# Patient Record
Sex: MALE | Race: WHITE | NOT HISPANIC OR LATINO | Employment: OTHER | URBAN - METROPOLITAN AREA
[De-identification: names, ages, dates, MRNs, and addresses within clinical notes are randomized per-mention and may not be internally consistent; named-entity substitution may affect disease eponyms.]

---

## 2017-05-05 ENCOUNTER — HOSPITAL ENCOUNTER (INPATIENT)
Facility: HOSPITAL | Age: 77
LOS: 2 days | Discharge: HOME | DRG: 189 | End: 2017-05-07
Attending: EMERGENCY MEDICINE | Admitting: INTERNAL MEDICINE
Payer: MEDICARE

## 2017-05-05 DIAGNOSIS — J96.01 ACUTE RESPIRATORY FAILURE WITH HYPOXIA: ICD-10-CM

## 2017-05-05 DIAGNOSIS — J44.1 COPD WITH EXACERBATION: Primary | ICD-10-CM

## 2017-05-05 DIAGNOSIS — J96.11 CHRONIC HYPOXEMIC RESPIRATORY FAILURE: ICD-10-CM

## 2017-05-05 DIAGNOSIS — R06.00 DYSPNEA, UNSPECIFIED TYPE: ICD-10-CM

## 2017-05-05 DIAGNOSIS — R09.02 HYPOXEMIA: ICD-10-CM

## 2017-05-05 DIAGNOSIS — R06.02 SOB (SHORTNESS OF BREATH): ICD-10-CM

## 2017-05-05 DIAGNOSIS — R73.9 HYPERGLYCEMIA: ICD-10-CM

## 2017-05-05 LAB
ALBUMIN SERPL BCP-MCNC: 3.2 G/DL
ALLENS TEST: ABNORMAL
ALP SERPL-CCNC: 73 U/L
ALT SERPL W/O P-5'-P-CCNC: 28 U/L
ANION GAP SERPL CALC-SCNC: 11 MMOL/L
AST SERPL-CCNC: 24 U/L
BASOPHILS # BLD AUTO: 0.03 K/UL
BASOPHILS NFR BLD: 0.3 %
BILIRUB SERPL-MCNC: 1.5 MG/DL
BILIRUB UR QL STRIP: NEGATIVE
BNP SERPL-MCNC: 36 PG/ML
BUN SERPL-MCNC: 12 MG/DL
CALCIUM SERPL-MCNC: 8.8 MG/DL
CHLORIDE SERPL-SCNC: 105 MMOL/L
CLARITY UR: CLEAR
CO2 SERPL-SCNC: 22 MMOL/L
COLOR UR: YELLOW
CREAT SERPL-MCNC: 1 MG/DL
DELSYS: ABNORMAL
DIFFERENTIAL METHOD: ABNORMAL
EOSINOPHIL # BLD AUTO: 0.2 K/UL
EOSINOPHIL NFR BLD: 1.9 %
ERYTHROCYTE [DISTWIDTH] IN BLOOD BY AUTOMATED COUNT: 13.5 %
EST. GFR  (AFRICAN AMERICAN): >60 ML/MIN/1.73 M^2
EST. GFR  (NON AFRICAN AMERICAN): >60 ML/MIN/1.73 M^2
FIO2: 21
GLUCOSE SERPL-MCNC: 152 MG/DL
GLUCOSE UR QL STRIP: ABNORMAL
HCO3 UR-SCNC: 18.7 MMOL/L (ref 24–28)
HCT VFR BLD AUTO: 44.7 %
HGB BLD-MCNC: 15.3 G/DL
HGB UR QL STRIP: NEGATIVE
KETONES UR QL STRIP: ABNORMAL
LACTATE SERPL-SCNC: 1.6 MMOL/L
LEUKOCYTE ESTERASE UR QL STRIP: NEGATIVE
LYMPHOCYTES # BLD AUTO: 1 K/UL
LYMPHOCYTES NFR BLD: 9.4 %
MCH RBC QN AUTO: 30.4 PG
MCHC RBC AUTO-ENTMCNC: 34.2 %
MCV RBC AUTO: 89 FL
MODE: ABNORMAL
MONOCYTES # BLD AUTO: 0.9 K/UL
MONOCYTES NFR BLD: 8.5 %
NEUTROPHILS # BLD AUTO: 8.9 K/UL
NEUTROPHILS NFR BLD: 79.9 %
NITRITE UR QL STRIP: NEGATIVE
PCO2 BLDA: 31.6 MMHG (ref 35–45)
PH SMN: 7.38 [PH] (ref 7.35–7.45)
PH UR STRIP: 6 [PH] (ref 5–8)
PLATELET # BLD AUTO: 162 K/UL
PMV BLD AUTO: 9.8 FL
PO2 BLDA: 53 MMHG (ref 80–100)
POC BE: -6 MMOL/L
POC SATURATED O2: 87 % (ref 95–100)
POTASSIUM SERPL-SCNC: 3.9 MMOL/L
PROT SERPL-MCNC: 7.4 G/DL
PROT UR QL STRIP: NEGATIVE
RBC # BLD AUTO: 5.04 M/UL
SAMPLE: ABNORMAL
SITE: ABNORMAL
SODIUM SERPL-SCNC: 138 MMOL/L
SP GR UR STRIP: 1.01 (ref 1–1.03)
TROPONIN I SERPL DL<=0.01 NG/ML-MCNC: 0.02 NG/ML
URN SPEC COLLECT METH UR: ABNORMAL
UROBILINOGEN UR STRIP-ACNC: ABNORMAL EU/DL
WBC # BLD AUTO: 11.09 K/UL

## 2017-05-05 PROCEDURE — 25000242 PHARM REV CODE 250 ALT 637 W/ HCPCS: Performed by: EMERGENCY MEDICINE

## 2017-05-05 PROCEDURE — 25000003 PHARM REV CODE 250: Performed by: NURSE PRACTITIONER

## 2017-05-05 PROCEDURE — 93005 ELECTROCARDIOGRAM TRACING: CPT

## 2017-05-05 PROCEDURE — 85025 COMPLETE CBC W/AUTO DIFF WBC: CPT

## 2017-05-05 PROCEDURE — 94640 AIRWAY INHALATION TREATMENT: CPT

## 2017-05-05 PROCEDURE — 11000001 HC ACUTE MED/SURG PRIVATE ROOM

## 2017-05-05 PROCEDURE — 83036 HEMOGLOBIN GLYCOSYLATED A1C: CPT

## 2017-05-05 PROCEDURE — 94761 N-INVAS EAR/PLS OXIMETRY MLT: CPT

## 2017-05-05 PROCEDURE — 63600175 PHARM REV CODE 636 W HCPCS: Performed by: EMERGENCY MEDICINE

## 2017-05-05 PROCEDURE — 27000221 HC OXYGEN, UP TO 24 HOURS

## 2017-05-05 PROCEDURE — 21400001 HC TELEMETRY ROOM

## 2017-05-05 PROCEDURE — 36415 COLL VENOUS BLD VENIPUNCTURE: CPT

## 2017-05-05 PROCEDURE — 81003 URINALYSIS AUTO W/O SCOPE: CPT

## 2017-05-05 PROCEDURE — 82803 BLOOD GASES ANY COMBINATION: CPT

## 2017-05-05 PROCEDURE — 63600175 PHARM REV CODE 636 W HCPCS: Performed by: NURSE PRACTITIONER

## 2017-05-05 PROCEDURE — 25500020 PHARM REV CODE 255: Performed by: EMERGENCY MEDICINE

## 2017-05-05 PROCEDURE — 36600 WITHDRAWAL OF ARTERIAL BLOOD: CPT

## 2017-05-05 PROCEDURE — 93010 ELECTROCARDIOGRAM REPORT: CPT | Mod: ,,, | Performed by: INTERNAL MEDICINE

## 2017-05-05 PROCEDURE — 83880 ASSAY OF NATRIURETIC PEPTIDE: CPT

## 2017-05-05 PROCEDURE — 96374 THER/PROPH/DIAG INJ IV PUSH: CPT

## 2017-05-05 PROCEDURE — 83605 ASSAY OF LACTIC ACID: CPT

## 2017-05-05 PROCEDURE — 99285 EMERGENCY DEPT VISIT HI MDM: CPT | Mod: 25

## 2017-05-05 PROCEDURE — 80053 COMPREHEN METABOLIC PANEL: CPT

## 2017-05-05 PROCEDURE — 84484 ASSAY OF TROPONIN QUANT: CPT

## 2017-05-05 PROCEDURE — 99900035 HC TECH TIME PER 15 MIN (STAT)

## 2017-05-05 PROCEDURE — 25000242 PHARM REV CODE 250 ALT 637 W/ HCPCS: Performed by: NURSE PRACTITIONER

## 2017-05-05 PROCEDURE — 87040 BLOOD CULTURE FOR BACTERIA: CPT | Mod: 59

## 2017-05-05 RX ORDER — ACETAMINOPHEN 325 MG/1
650 TABLET ORAL EVERY 6 HOURS PRN
Status: DISCONTINUED | OUTPATIENT
Start: 2017-05-05 | End: 2017-05-07 | Stop reason: HOSPADM

## 2017-05-05 RX ORDER — RAMELTEON 8 MG/1
8 TABLET ORAL NIGHTLY PRN
Status: DISCONTINUED | OUTPATIENT
Start: 2017-05-05 | End: 2017-05-07 | Stop reason: HOSPADM

## 2017-05-05 RX ORDER — MOXIFLOXACIN HYDROCHLORIDE 400 MG/250ML
400 INJECTION, SOLUTION INTRAVENOUS
Status: DISCONTINUED | OUTPATIENT
Start: 2017-05-05 | End: 2017-05-07 | Stop reason: HOSPADM

## 2017-05-05 RX ORDER — BUDESONIDE 0.5 MG/2ML
0.5 INHALANT ORAL EVERY 12 HOURS
Status: DISCONTINUED | OUTPATIENT
Start: 2017-05-05 | End: 2017-05-07 | Stop reason: HOSPADM

## 2017-05-05 RX ORDER — IPRATROPIUM BROMIDE AND ALBUTEROL SULFATE 2.5; .5 MG/3ML; MG/3ML
3 SOLUTION RESPIRATORY (INHALATION)
Status: DISCONTINUED | OUTPATIENT
Start: 2017-05-05 | End: 2017-05-07 | Stop reason: HOSPADM

## 2017-05-05 RX ORDER — IPRATROPIUM BROMIDE AND ALBUTEROL SULFATE 2.5; .5 MG/3ML; MG/3ML
3 SOLUTION RESPIRATORY (INHALATION)
Status: COMPLETED | OUTPATIENT
Start: 2017-05-05 | End: 2017-05-05

## 2017-05-05 RX ORDER — ONDANSETRON 2 MG/ML
4 INJECTION INTRAMUSCULAR; INTRAVENOUS EVERY 8 HOURS PRN
Status: DISCONTINUED | OUTPATIENT
Start: 2017-05-05 | End: 2017-05-07 | Stop reason: HOSPADM

## 2017-05-05 RX ORDER — METHYLPREDNISOLONE SOD SUCC 125 MG
125 VIAL (EA) INJECTION
Status: COMPLETED | OUTPATIENT
Start: 2017-05-05 | End: 2017-05-05

## 2017-05-05 RX ADMIN — METHYLPREDNISOLONE SODIUM SUCCINATE 125 MG: 125 INJECTION, POWDER, FOR SOLUTION INTRAMUSCULAR; INTRAVENOUS at 11:05

## 2017-05-05 RX ADMIN — ACETAMINOPHEN 650 MG: 325 TABLET ORAL at 09:05

## 2017-05-05 RX ADMIN — IPRATROPIUM BROMIDE AND ALBUTEROL SULFATE 3 ML: .5; 3 SOLUTION RESPIRATORY (INHALATION) at 07:05

## 2017-05-05 RX ADMIN — MOXIFLOXACIN HYDROCHLORIDE 400 MG: 400 INJECTION, SOLUTION INTRAVENOUS at 07:05

## 2017-05-05 RX ADMIN — IPRATROPIUM BROMIDE AND ALBUTEROL SULFATE 3 ML: .5; 3 SOLUTION RESPIRATORY (INHALATION) at 03:05

## 2017-05-05 RX ADMIN — METHYLPREDNISOLONE SODIUM SUCCINATE 80 MG: 40 INJECTION, POWDER, FOR SOLUTION INTRAMUSCULAR; INTRAVENOUS at 10:05

## 2017-05-05 RX ADMIN — RAMELTEON 8 MG: 8 TABLET, FILM COATED ORAL at 10:05

## 2017-05-05 RX ADMIN — IOHEXOL 100 ML: 350 INJECTION, SOLUTION INTRAVENOUS at 02:05

## 2017-05-05 RX ADMIN — IPRATROPIUM BROMIDE AND ALBUTEROL SULFATE 3 ML: .5; 3 SOLUTION RESPIRATORY (INHALATION) at 11:05

## 2017-05-05 RX ADMIN — BUDESONIDE 0.5 MG: 0.5 SUSPENSION RESPIRATORY (INHALATION) at 07:05

## 2017-05-05 RX ADMIN — IPRATROPIUM BROMIDE AND ALBUTEROL SULFATE 3 ML: .5; 3 SOLUTION RESPIRATORY (INHALATION) at 01:05

## 2017-05-05 NOTE — SUBJECTIVE & OBJECTIVE
No past medical history on file.    No past surgical history on file.    Review of patient's allergies indicates:  No Known Allergies    No current facility-administered medications on file prior to encounter.      No current outpatient prescriptions on file prior to encounter.     Family History     None        Social History Main Topics    Smoking status: Not on file    Smokeless tobacco: Not on file    Alcohol use Not on file    Drug use: Not on file    Sexual activity: Not on file     Review of Systems   Constitutional: Positive for fever. Negative for appetite change, chills and fatigue.   HENT: Negative for tinnitus and trouble swallowing.    Eyes: Negative.    Respiratory: Positive for cough and shortness of breath. Negative for apnea, choking, chest tightness, wheezing and stridor.    Cardiovascular: Negative for chest pain, palpitations and leg swelling.   Gastrointestinal: Negative for abdominal pain, blood in stool, constipation, diarrhea, nausea and vomiting.   Endocrine: Negative.    Genitourinary: Negative.    Musculoskeletal: Negative.    Skin: Negative.    Allergic/Immunologic: Negative.    Neurological: Negative for dizziness, tremors, seizures, syncope, facial asymmetry, speech difficulty, weakness, light-headedness, numbness and headaches.   Hematological: Negative.    Psychiatric/Behavioral: Negative.      Objective:     Vital Signs (Most Recent):  Temp: 98.1 °F (36.7 °C) (05/05/17 1827)  Pulse: 106 (05/05/17 1827)  Resp: (!) 22 (05/05/17 1827)  BP: 131/81 (05/05/17 1827)  SpO2: 95 % (05/05/17 1827) Vital Signs (24h Range):  Temp:  [98.1 °F (36.7 °C)-99.6 °F (37.6 °C)] 98.1 °F (36.7 °C)  Pulse:  [] 106  Resp:  [18-22] 22  SpO2:  [92 %-96 %] 95 %  BP: (117-176)/(58-86) 131/81        There is no height or weight on file to calculate BMI.    Physical Exam   Constitutional: He is oriented to person, place, and time. He appears well-developed and well-nourished.   Obese   HENT:   Head:  Normocephalic and atraumatic.   Eyes: Conjunctivae and EOM are normal.   Neck: Normal range of motion. Neck supple.   Cardiovascular: Normal rate, regular rhythm and intact distal pulses.    Murmur heard.  Pulmonary/Chest: Effort normal. He has wheezes.   Abdominal: Soft. Bowel sounds are normal. There is no tenderness.   Musculoskeletal: Normal range of motion.   Neurological: He is oriented to person, place, and time. He has normal reflexes.   Skin: Skin is warm and dry.   Psychiatric: He has a normal mood and affect. His behavior is normal. Judgment and thought content normal.   Nursing note and vitals reviewed.       Significant Labs:   ABGs:     Recent Labs  Lab 05/05/17  1318   PH 7.380   PCO2 31.6*   HCO3 18.7*   POCSATURATED 87*   BE -6     CBC:     Recent Labs  Lab 05/05/17  1136   WBC 11.09   HGB 15.3   HCT 44.7        CMP:     Recent Labs  Lab 05/05/17  1136      K 3.9      CO2 22*   *   BUN 12   CREATININE 1.0   CALCIUM 8.8   PROT 7.4   ALBUMIN 3.2*   BILITOT 1.5*   ALKPHOS 73   AST 24   ALT 28   ANIONGAP 11   EGFRNONAA >60     Lactic Acid:     Recent Labs  Lab 05/05/17  1136   LACTATE 1.6     Troponin:     Recent Labs  Lab 05/05/17  1136   TROPONINI 0.022     Urine Studies:     Recent Labs  Lab 05/05/17  1353   COLORU Yellow   APPEARANCEUA Clear   PHUR 6.0   SPECGRAV 1.015   PROTEINUA Negative   GLUCUA Trace*   KETONESU Trace*   BILIRUBINUA Negative   OCCULTUA Negative   NITRITE Negative   UROBILINOGEN 2.0-3.0*   LEUKOCYTESUR Negative       Significant Imaging:   Imaging Results         CTA Chest Non-Coronary (PE Study) (Final result) Result time:  05/05/17 15:12:41    Final result by Max York III, MD (05/05/17 15:12:41)    Narrative:    CT angiogram of the chest.    Clinical indication: Shortness of breath. Chest pain.    Multiplanar imaging submitted. Standard and MIPS/3-D images submitted.    There is no mediastinal mass or bulky adenopathy. Heart size is normal.  No hilar mass or bulky adenopathy. Small hilar nodes are identified. No axillary adenopathy.    There is opacification of the thoracic aorta with atheromatous change. No evidence of aneurysm formation or dissection. Suspect a small hiatal hernia.    There is opacification of the thoracic of the pulmonary arterial system. No gross filling defects in the major branches    The lungs show severe chronic change with diffuse emphysematous change primarily centrilobular. There is thickening of the interlobular septa as well. There is moderately severe scarring posteriorly at both lung bases and suspicion of mild superimposed basilar infiltrate and atelectatic change, greater on the right. There is bronchiectatic change greatest at the lung bases.    Within the upper portion of the abdomen, there is no liver or splenic abnormality within the visualized segments.. There is no adrenal or pancreatic mass or enlargement. There is atheromatous change along the aorta. Diverticula are noted in the colon without gross evidence of diverticulitis within the visualized segments. Bony windows show multilevel degenerative change but no gross acute abnormality.    Impression    1. Negative for acute pulmonary emboli. Negative for thoracic aortic aneurysm or dissection.    2. Severe chronic lung disease including rather severe emphysematous change bilaterally. There also changes of bronchiectasis and basilar scarring/fibrosis. There is superimposed posterior basilar atelectasis and or infiltrate of a mild degree, greater on the right.    3. Otherwise as above.        Electronically signed by: MAX SNEED MD  Date:     05/05/17  Time:    15:12             X-Ray Chest PA And Lateral (Final result) Result time:  05/05/17 12:01:02    Final result by Max Sneed III, MD (05/05/17 12:01:02)    Impression:     Suspect minimal right basilar atelectasis or scarring. Chest is otherwise static. Granuloma right upper lung  field.      Electronically signed by: NATASHA SNEED MD  Date:     05/05/17  Time:    12:01     Narrative:    Two-view chest x-ray.    Clinical indication: Asthma    Compared to 2010.        Heart size is normal. The lungs again demonstrate a granuloma in the right upper lung field. Currently there is minimal right basilar scarring or atelectasis. No consolidation or effusion.

## 2017-05-05 NOTE — IP AVS SNAPSHOT
[facility]  [address] [place] [address] [address] [place] [address]           Patient Discharge Instructions   Our goal is to set you up for success. This packet includes information on your condition, medications, and your home care.  It will help you care for yourself to prevent having to return to the hospital.     Please ask your nurse if you have any questions.      There are many details to remember when preparing to leave the hospital. Here is what you will need to do:    1. Take your medicine. If you are prescribed medications, review your Medication List on the following pages. You may have new medications to  at the pharmacy and others that you'll need to stop taking. Review the instructions for how and when to take your medications. Talk with your doctor or nurses if you are unsure of what to do.     2. Go to your follow-up appointments. Specific follow-up information is listed in the following pages. Your may be contacted by a nurse or clinical provider about future appointments. Be sure we have all of the phone numbers to reach you. Please contact your provider's office if you are unable to make an appointment.     3. Watch for warning signs. Your doctor or nurse will give you detailed warning signs to watch for and when to call for assistance. These instructions may also include educational information about your condition. If you experience any of warning signs to your health, call your doctor.               ** Verify the list of medication(s) below is accurate and up to date. Carry this with you in case of emergency. If your medications have changed, please notify your healthcare provider.             Medication List      START taking these medications        Additional Info                      albuterol 2.5 mg /3 mL (0.083 %) nebulizer solution   Commonly known as:  PROVENTIL   Quantity:  1 Box   Refills:  3   Dose:  2.5 mg    Instructions:  Take 3 mLs (2.5 mg total) by  nebulization every 8 (eight) hours while awake.     Begin Date    AM    Noon    PM    Bedtime       levoFLOXacin 750 MG tablet   Commonly known as:  LEVAQUIN   Quantity:  5 tablet   Refills:  0   Dose:  750 mg    Instructions:  Take 1 tablet (750 mg total) by mouth once daily.     Begin Date    AM    Noon    PM    Bedtime       predniSONE 20 MG tablet   Commonly known as:  DELTASONE   Quantity:  30 tablet   Refills:  1   Dose:  20 mg    Instructions:  Take 1 tablet (20 mg total) by mouth As instructed. 3tab po daily x3days,2tabs po daily x3days,1tab po daily x3days,1/2tab po daily x3days     Begin Date    AM    Noon    PM    Bedtime         CONTINUE taking these medications        Additional Info                      escitalopram oxalate 20 MG tablet   Commonly known as:  LEXAPRO   Refills:  0   Dose:  20 mg    Last time this was given:  20 mg on 5/7/2017  8:37 AM   Instructions:  Take 20 mg by mouth once daily.     Begin Date    AM    Noon    PM    Bedtime       metformin 500 MG tablet   Commonly known as:  GLUCOPHAGE   Refills:  0   Dose:  500 mg    Instructions:  Take 500 mg by mouth every evening.     Begin Date    AM    Noon    PM    Bedtime       pantoprazole 40 MG tablet   Commonly known as:  PROTONIX   Refills:  0   Dose:  40 mg    Instructions:  Take 40 mg by mouth once daily.     Begin Date    AM    Noon    PM    Bedtime       simvastatin 10 MG tablet   Commonly known as:  ZOCOR   Refills:  0   Dose:  10 mg    Last time this was given:  10 mg on 5/7/2017  8:37 AM   Instructions:  Take 10 mg by mouth once daily.     Begin Date    AM    Noon    PM    Bedtime       tamsulosin 0.4 mg Cp24   Commonly known as:  FLOMAX   Refills:  0   Dose:  0.4 mg    Last time this was given:  0.4 mg on 5/7/2017  8:37 AM   Instructions:  Take 0.4 mg by mouth once daily. evening     Begin Date    AM    Noon    PM    Bedtime            Where to Get Your Medications      You can get these medications from any pharmacy     Bring a  paper prescription for each of these medications     albuterol 2.5 mg /3 mL (0.083 %) nebulizer solution    levoFLOXacin 750 MG tablet    predniSONE 20 MG tablet                  Please bring to all follow up appointments:    1. A copy of your discharge instructions.  2. All medicines you are currently taking in their original bottles.  3. Identification and insurance card.    Please arrive 15 minutes ahead of scheduled appointment time.    Please call 24 hours in advance if you must reschedule your appointment and/or time.        Your Scheduled Appointments     May 08, 2017 12:00 PM CDT   Spirometry with SPIROMETRY, [facility]- Pulmonary Function [facility] ([facility])    [address] [place] [address]   [phone]            May 08, 2017 12:20 PM CDT   New Patient with Elizabeth Lejeune, NP   [facility] ([facility])    [address] [place] [address]   [phone]              Follow-up Information     Follow up with Mir Joaquin MD. Schedule an appointment as soon as possible for a visit in 1 week.    Specialty:  Pulmonary Disease    Why:  hospital follow up    Contact information:    [address]  [place] [address]  [phone]          Discharge Instructions     Future Orders    Activity as tolerated     Call MD for:  difficulty breathing or increased cough     Call MD for:  increased confusion or weakness     Call MD for:  persistent dizziness, light-headedness, or visual disturbances     Diet general     Questions:    Total calories:      Fat restriction, if any:      Protein restriction, if any:      Na restriction, if any:      Fluid restriction:      Additional restrictions:  Diabetic 1800    OXYGEN FOR HOME USE     Questions:    Liter Flow:  2    Duration:  Continuous    Qualifying SpO2:  87    Testing done at:  Rest    Route:  nasal cannula    Portable mode:  pulse dose acceptable    Device:  home concentrator with portable unit    Length of need  (in months):  99 mos    Patient condition with qualifying saturation:  COPD    Height:  6' (1.829 m)    Weight:  98.9 kg (218 lb)    Does patient have medical equipment at home?:      Alternative treatment measures have been tried or considered and deemed clinically ineffective.:  Yes        Primary Diagnosis     Your primary diagnosis was:  Chronic Bronchitis      Admission Information     Date & Time Provider Department CSN    5/5/2017 10:26 AM Edwin Jackman MD [facility] -  [id number]      Care Providers     Provider Role Specialty Primary office phone    Edwin Jackman MD Attending Provider Internal Medicine [phone]    Edwin Jackman MD Team Attending  Internal Medicine [phone]    Mir Joaquin MD Consulting Physician  Pulmonary Disease [phone]      Your Vitals Were     BP Pulse Temp Resp Height Weight    143/70 (BP Location: Right arm, Patient Position: Lying, BP Method: Automatic) 122 98 °F (36.7 °C) (Oral) 20 6' (1.829 m) 98.9 kg (218 lb)    SpO2 BMI             84% 29.57 kg/m2         Recent Lab Values        5/5/2017                           8:50 PM           A1C 7.3 (H)           Comment for A1C at  8:50 PM on 5/5/2017:  According to ADA guidelines, hemoglobin A1C <7.0% represents  optimal control in non-pregnant diabetic patients.  Different  metrics may apply to specific populations.   Standards of Medical Care in Diabetes - 2016.  For the purpose of screening for the presence of diabetes:  <5.7%     Consistent with the absence of diabetes  5.7-6.4%  Consistent with increasing risk for diabetes   (prediabetes)  >or=6.5%  Consistent with diabetes  Currently no consensus exists for use of hemoglobin A1C  for diagnosis of diabetes for children.        Pending Labs     Order Current Status    Blood culture #1 Preliminary result    Blood culture #2 Preliminary result      Allergies as of 5/7/2017     No Known Allergies      [facility]     [facility]  Care Line - 24/7 Assistance  Unless otherwise directed by your provider, please contact [facility], our nurse care line that is available for 24/7 assistance.     Registered nurses in the [facility] provide clinical advisement, health education, appointment booking, and other advisory services.  Call for this free service at [phone].        Advance Directives     An advance directive is a document which, in the event you are no longer able to make decisions for yourself, tells your healthcare team what kind of treatment you do or do not want to receive, or who you would like to make those decisions for you.  If you do not currently have an advance directive, [facility] encourages you to create one.  For more information call:  [phone]), [phone] ([phone]),  or log on to [email or web address].        Smoking Cessation     If you would like to quit smoking:   You may be eligible for free services if you are a [place] resident and started smoking cigarettes before September 1, 1988.  Call the Smoking Cessation Trust (SCT) toll free at (283) 216-6372 or (998) 609-3095.   Call 8-443-QUIT-NOW if you do not meet the above criteria.   Contact us via email: [email or web address]   View our website for more information: [email or web address]        Language Assistance Services     ATTENTION: Language assistance services are available, free of charge. Please call [phone].      ATENCIÓN: Si habla español, tiene a chin disposición servicios gratuitos de asistencia lingüística. Llame al [phone].     CHÚ Ý: N?u b?n nói Ti?ng Vi?t, có các d?ch v? h? tr? ngôn ng? mi?n phí dành cho b?n. G?i s? [phone].        Diabetes Discharge Instructions                                   [facility] Sign-Up     Activating your [facility] account is as easy as 1-2-3!     1) Visit [email or web address], select Sign Up Now, enter this activation code and your date of birth,  then select Next.  [email or web address]  Expires: 6/21/2017 11:57 AM      2) Create a username and password to use when you visit [facility] in the future and select a security question in case you lose your password and select Next.    3) Enter your e-mail address and click Sign Up!    Additional Information  If you have questions, please e-mail [email or web address] or call [phone] to talk to our [facility] staff. Remember, [facility] is NOT to be used for urgent needs. For medical emergencies, dial 911.          [facility] complies with applicable Federal civil rights laws and does not discriminate on the basis of race, color, national origin, age, disability, or sex.

## 2017-05-05 NOTE — ED NOTES
Daughter at nurses station requesting to speak to doctor and someone fix alarm on monitor.  O2 sat 90% on RA.  O2 2L NC placed on pt.

## 2017-05-05 NOTE — ASSESSMENT & PLAN NOTE
- Admit to Med Surg  - Supplemental oxygen prn, keep O2 sats > 88%  - Scheduled inhaled medications

## 2017-05-05 NOTE — ED PROVIDER NOTES
SCRIBE #1 NOTE: I, Mima Hope, am scribing for, and in the presence of, Rashaad Bailey MD. I have scribed the entire note.      History      Chief Complaint   Patient presents with    Shortness of Breath     shortness of breath and fever       Review of patient's allergies indicates:  No Known Allergies     HPI   HPI    5/5/2017, 11:17 AM   History obtained from the patient      History of Present Illness: Stone Reveles is a 76 y.o. male patient who presents to the Emergency Department for SOB which onset gradually 4 days ago. Symptoms are constant and moderate in severity. Pt notes positive sick contact. Pt son had bronchitis. No mitigating or exacerbating factors reported. Associated sxs include subjective fever and cough. Patient denies any n/v/d, abd pain, CP, chest tightness, wheezing, back pain, dysuria, hematuria, flank pain, HA, lightheadedness, and all other sxs at this time. Prior Tx includes ibuprofen with intermittent relief and nebulizer. No further complaints or concerns at this time.         Arrival mode: Personal vehicle    PCP: No primary care provider on file.       Past Medical History:  Past medical history reviewed not relevant      Past Surgical History:  Past surgical history reviewed not relevant      Family History:  Family history reviewed not relevant      Social History:  Unknown    Social History Main Topics    Social History Main Topics    Smoking status: Unknown if ever smoked    Smokeless tobacco: Unknown if ever used    Alcohol Use: Unknown drinking history    Drug Use: Unknown if ever used    Sexual Activity: Unknown         ROS   Review of Systems   Constitutional: Positive for fever (subjective).   HENT: Negative for congestion and sore throat.    Respiratory: Positive for cough and shortness of breath. Negative for chest tightness and wheezing.    Cardiovascular: Negative for chest pain.   Gastrointestinal: Negative for abdominal pain, constipation, diarrhea, nausea and  vomiting.   Genitourinary: Negative for decreased urine volume, difficulty urinating, dysuria, flank pain, frequency and hematuria.   Musculoskeletal: Negative for back pain.   Skin: Negative for rash.   Neurological: Negative for dizziness, weakness, light-headedness and headaches.   Hematological: Does not bruise/bleed easily.       Physical Exam    Initial Vitals   BP Pulse Resp Temp SpO2   05/05/17 1033 05/05/17 1033 05/05/17 1033 05/05/17 1033 05/05/17 1033   136/71 100 20 99.6 °F (37.6 °C) 96 %      Physical Exam  Nursing Notes and Vital Signs Reviewed.  Constitutional: Patient is in no acute distress. Awake and alert. Well-developed and well-nourished.  Head: Atraumatic. Normocephalic.  Eyes: PERRL. EOM intact. Conjunctivae are not pale. No scleral icterus.  ENT: Mucous membranes are moist. Oropharynx is clear and symmetric.    Neck: Supple. Full ROM. No lymphadenopathy.  Cardiovascular: Regular rate. Regular rhythm. No murmurs, rubs, or gallops. Distal pulses are 2+ and symmetric.  Pulmonary/Chest: No respiratory distress. Mild wheezing bilaterally. No rales, or rhonchi.  Abdominal: Soft and non-distended.  There is no tenderness.  No rebound, guarding, or rigidity. Good bowel sounds.  Musculoskeletal: Moves all extremities. No obvious deformities. No edema. No calf tenderness.  Skin: Warm and dry.  Neurological:  Alert, awake, and appropriate.  Normal speech.  No acute focal neurological deficits are appreciated.  Psychiatric: Normal affect. Good eye contact. Appropriate in content.    ED Course    Procedures  ED Vital Signs:  Vitals:    05/05/17 1033 05/05/17 1129 05/05/17 1240 05/05/17 1300   BP: 136/71  (!) 117/58    Pulse: 100 81 89 89   Resp: 20 20 18 20   Temp: 99.6 °F (37.6 °C)      TempSrc: Oral      SpO2: 96% 96% (!) 92% (!) 93%    05/05/17 1354 05/05/17 1540 05/05/17 1800 05/05/17 1827   BP: (!) 176/86  (!) 147/70 131/81   Pulse: 96 89 95 106   Resp: 20 18 18 (!) 22   Temp:   98.4 °F (36.9 °C)  98.1 °F (36.7 °C)   TempSrc:   Oral Oral   SpO2: (!) 94% (!) 94% 95% 95%    05/05/17 1936   BP: 139/71   Pulse: 93   Resp: 20   Temp: 98.1 °F (36.7 °C)   TempSrc: Oral   SpO2: 96%       Abnormal Lab Results:  Labs Reviewed   CBC W/ AUTO DIFFERENTIAL - Abnormal; Notable for the following:        Result Value    Gran # 8.9 (*)     Gran% 79.9 (*)     Lymph% 9.4 (*)     All other components within normal limits   COMPREHENSIVE METABOLIC PANEL - Abnormal; Notable for the following:     CO2 22 (*)     Glucose 152 (*)     Albumin 3.2 (*)     Total Bilirubin 1.5 (*)     All other components within normal limits   URINALYSIS - Abnormal; Notable for the following:     Glucose, UA Trace (*)     Ketones, UA Trace (*)     Urobilinogen, UA 2.0-3.0 (*)     All other components within normal limits   ISTAT PROCEDURE - Abnormal; Notable for the following:     POC PCO2 31.6 (*)     POC PO2 53 (*)     POC HCO3 18.7 (*)     POC SATURATED O2 87 (*)     All other components within normal limits   CULTURE, BLOOD   CULTURE, BLOOD   LACTIC ACID, PLASMA   TROPONIN I   B-TYPE NATRIURETIC PEPTIDE        All Lab Results:  Results for orders placed or performed during the hospital encounter of 05/05/17   CBC auto differential   Result Value Ref Range    WBC 11.09 3.90 - 12.70 K/uL    RBC 5.04 4.60 - 6.20 M/uL    Hemoglobin 15.3 14.0 - 18.0 g/dL    Hematocrit 44.7 40.0 - 54.0 %    MCV 89 82 - 98 fL    MCH 30.4 27.0 - 31.0 pg    MCHC 34.2 32.0 - 36.0 %    RDW 13.5 11.5 - 14.5 %    Platelets 162 150 - 350 K/uL    MPV 9.8 9.2 - 12.9 fL    Gran # 8.9 (H) 1.8 - 7.7 K/uL    Lymph # 1.0 1.0 - 4.8 K/uL    Mono # 0.9 0.3 - 1.0 K/uL    Eos # 0.2 0.0 - 0.5 K/uL    Baso # 0.03 0.00 - 0.20 K/uL    Gran% 79.9 (H) 38.0 - 73.0 %    Lymph% 9.4 (L) 18.0 - 48.0 %    Mono% 8.5 4.0 - 15.0 %    Eosinophil% 1.9 0.0 - 8.0 %    Basophil% 0.3 0.0 - 1.9 %    Differential Method Automated    Comprehensive metabolic panel   Result Value Ref Range    Sodium 138 136 - 145  mmol/L    Potassium 3.9 3.5 - 5.1 mmol/L    Chloride 105 95 - 110 mmol/L    CO2 22 (L) 23 - 29 mmol/L    Glucose 152 (H) 70 - 110 mg/dL    BUN, Bld 12 8 - 23 mg/dL    Creatinine 1.0 0.5 - 1.4 mg/dL    Calcium 8.8 8.7 - 10.5 mg/dL    Total Protein 7.4 6.0 - 8.4 g/dL    Albumin 3.2 (L) 3.5 - 5.2 g/dL    Total Bilirubin 1.5 (H) 0.1 - 1.0 mg/dL    Alkaline Phosphatase 73 55 - 135 U/L    AST 24 10 - 40 U/L    ALT 28 10 - 44 U/L    Anion Gap 11 8 - 16 mmol/L    eGFR if African American >60 >60 mL/min/1.73 m^2    eGFR if non African American >60 >60 mL/min/1.73 m^2   Lactic acid, plasma   Result Value Ref Range    Lactate (Lactic Acid) 1.6 0.5 - 2.2 mmol/L   Troponin I   Result Value Ref Range    Troponin I 0.022 0.000 - 0.026 ng/mL   Urinalysis   Result Value Ref Range    Specimen UA Urine, Clean Catch     Color, UA Yellow Yellow, Straw, Jolie    Appearance, UA Clear Clear    pH, UA 6.0 5.0 - 8.0    Specific Gravity, UA 1.015 1.005 - 1.030    Protein, UA Negative Negative    Glucose, UA Trace (A) Negative    Ketones, UA Trace (A) Negative    Bilirubin (UA) Negative Negative    Occult Blood UA Negative Negative    Nitrite, UA Negative Negative    Urobilinogen, UA 2.0-3.0 (A) <2.0 EU/dL    Leukocytes, UA Negative Negative   Brain natriuretic peptide   Result Value Ref Range    BNP 36 0 - 99 pg/mL   ISTAT PROCEDURE   Result Value Ref Range    POC PH 7.380 7.35 - 7.45    POC PCO2 31.6 (L) 35 - 45 mmHg    POC PO2 53 (LL) 80 - 100 mmHg    POC HCO3 18.7 (L) 24 - 28 mmol/L    POC BE -6 -2 to 2 mmol/L    POC SATURATED O2 87 (L) 95 - 100 %    Sample ARTERIAL     Site LR     Allens Test Pass     DelSys Room Air     Mode SPONT     FiO2 21          Imaging Results:  Imaging Results         CTA Chest Non-Coronary (PE Study) (Final result) Result time:  05/05/17 15:12:41    Final result by Max York III, MD (05/05/17 15:12:41)    Narrative:    CT angiogram of the chest.    Clinical indication: Shortness of breath. Chest  pain.    Multiplanar imaging submitted. Standard and MIPS/3-D images submitted.    There is no mediastinal mass or bulky adenopathy. Heart size is normal. No hilar mass or bulky adenopathy. Small hilar nodes are identified. No axillary adenopathy.    There is opacification of the thoracic aorta with atheromatous change. No evidence of aneurysm formation or dissection. Suspect a small hiatal hernia.    There is opacification of the thoracic of the pulmonary arterial system. No gross filling defects in the major branches    The lungs show severe chronic change with diffuse emphysematous change primarily centrilobular. There is thickening of the interlobular septa as well. There is moderately severe scarring posteriorly at both lung bases and suspicion of mild superimposed basilar infiltrate and atelectatic change, greater on the right. There is bronchiectatic change greatest at the lung bases.    Within the upper portion of the abdomen, there is no liver or splenic abnormality within the visualized segments.. There is no adrenal or pancreatic mass or enlargement. There is atheromatous change along the aorta. Diverticula are noted in the colon without gross evidence of diverticulitis within the visualized segments. Bony windows show multilevel degenerative change but no gross acute abnormality.    Impression    1. Negative for acute pulmonary emboli. Negative for thoracic aortic aneurysm or dissection.    2. Severe chronic lung disease including rather severe emphysematous change bilaterally. There also changes of bronchiectasis and basilar scarring/fibrosis. There is superimposed posterior basilar atelectasis and or infiltrate of a mild degree, greater on the right.    3. Otherwise as above.        Electronically signed by: MAX SNEED MD  Date:     05/05/17  Time:    15:12             X-Ray Chest PA And Lateral (Final result) Result time:  05/05/17 12:01:02    Final result by Max Sneed III, MD  (05/05/17 12:01:02)    Impression:     Suspect minimal right basilar atelectasis or scarring. Chest is otherwise static. Granuloma right upper lung field.      Electronically signed by: NATASHA SNEED MD  Date:     05/05/17  Time:    12:01     Narrative:    Two-view chest x-ray.    Clinical indication: Asthma    Compared to 2010.        Heart size is normal. The lungs again demonstrate a granuloma in the right upper lung field. Currently there is minimal right basilar scarring or atelectasis. No consolidation or effusion.             The EKG was ordered, reviewed, and independently interpreted by the ED provider.  Interpretation time: 10:33  Rate: 96 BPM  Rhythm: normal sinus rhythm  Interpretation: No STEMI.             The Emergency Provider reviewed the vital signs and test results, which are outlined above.    ED Discussion     4:09 PM: Discussed case with Lamar Jj NP ([facility]). Lamar agrees with current care and management of pt and accepts admission.   Admitting Service: [facility] medicine   Admitting Physician: Dr. Jackman  Admit to: Med Surg    4:10 PM: Re-evaluated pt. I have discussed test results, shared treatment plan, and the need for admission with patient and family at bedside. Pt and family express understanding at this time and agree with all information. All questions answered. Pt and family have no further questions or concerns at this time. Pt is ready for admit.        ED Medication(s):  Medications   moxifloxacin 400 mg/250 mL IVPB 400 mg (not administered)   methylPREDNISolone sodium succinate injection 80 mg (not administered)   albuterol-ipratropium 2.5mg-0.5mg/3mL nebulizer solution 3 mL (not administered)   budesonide nebulizer solution 0.5 mg (not administered)   ondansetron injection 4 mg (not administered)   promethazine (PHENERGAN) 6.25 mg in dextrose 5 % 50 mL IVPB (not administered)   acetaminophen tablet 650 mg (not administered)   ramelteon tablet 8 mg (not  administered)   albuterol-ipratropium 2.5mg-0.5mg/3mL nebulizer solution 3 mL (3 mLs Nebulization Given 5/5/17 1129)   methylPREDNISolone sodium succinate injection 125 mg (125 mg Intravenous Given 5/5/17 1115)   albuterol-ipratropium 2.5mg-0.5mg/3mL nebulizer solution 3 mL (3 mLs Nebulization Given 5/5/17 1300)   omnipaque 350 iohexol 100 mL (100 mLs Intravenous Given 5/5/17 1447)   albuterol-ipratropium 2.5mg-0.5mg/3mL nebulizer solution 3 mL (3 mLs Nebulization Given 5/5/17 1540)       There are no discharge medications for this patient.            Medical Decision Making    Medical Decision Making:   Clinical Tests:   Lab Tests: Ordered and Reviewed  Radiological Study: Ordered and Reviewed  Medical Tests: Ordered and Reviewed           Scribe Attestation:   Scribe #1: I performed the above scribed service and the documentation accurately describes the services I performed. I attest to the accuracy of the note.    Attending:   Physician Attestation Statement for Scribe #1: I, Rashaad Bailey MD, personally performed the services described in this documentation, as scribed by Mima Hope, in my presence, and it is both accurate and complete.          Clinical Impression       ICD-10-CM ICD-9-CM   1. COPD with exacerbation J44.1 491.21   2. SOB (shortness of breath) R06.02 786.05   3. Hypoxemia R09.02 799.02   4. Dyspnea, unspecified type R06.00 786.09       Disposition:   Disposition: Admitted  Condition: Crystal Bailey MD  05/05/17 1941

## 2017-05-05 NOTE — H&P
[facility] Hospital Medicine  History & Physical    Patient Name: Stone Reveles  MRN: [id number]  Admission Date: 5/5/2017  Attending Physician: Edwin Jackman MD   Primary Care Provider: No primary care provider on file.         Patient information was obtained from patient and ER records.     Subjective:     Principal Problem:COPD with exacerbation    Chief Complaint:   Chief Complaint   Patient presents with    Shortness of Breath     shortness of breath and fever        HPI: Sotne Reveles is a 76 year old male with a PMHx of ??? Who presented to the Emergency Department with c/o SOB x 4 days. Associated symptoms include: subjective fever and cough. Pt states taking ibuprofen and nebulizer with partial relief. ED workup revealed: glucose 152, total bilirubin 1.5. ABG PO2 53. CTA chest with no PE, negative for aortic aneurysm or dissection, severe chronic lung disease. Pt given Duoneb x 3 and Solumedrol 125 mg IVP x 1 in ED. Pt admitted to Med Surg for Acute hypoxic respiratory failure secondary to acute COPD exacerbation.    No past medical history on file.    No past surgical history on file.    Review of patient's allergies indicates:  No Known Allergies    No current facility-administered medications on file prior to encounter.      No current outpatient prescriptions on file prior to encounter.     Family History     None        Social History Main Topics    Smoking status: Not on file    Smokeless tobacco: Not on file    Alcohol use Not on file    Drug use: Not on file    Sexual activity: Not on file     Review of Systems   Constitutional: Positive for fever. Negative for appetite change, chills and fatigue.   HENT: Negative for tinnitus and trouble swallowing.    Eyes: Negative.    Respiratory: Positive for cough and shortness of breath. Negative for apnea, choking, chest tightness, wheezing and stridor.    Cardiovascular: Negative for chest pain, palpitations and leg swelling.    Gastrointestinal: Negative for abdominal pain, blood in stool, constipation, diarrhea, nausea and vomiting.   Endocrine: Negative.    Genitourinary: Negative.    Musculoskeletal: Negative.    Skin: Negative.    Allergic/Immunologic: Negative.    Neurological: Negative for dizziness, tremors, seizures, syncope, facial asymmetry, speech difficulty, weakness, light-headedness, numbness and headaches.   Hematological: Negative.    Psychiatric/Behavioral: Negative.      Objective:     Vital Signs (Most Recent):  Temp: 98.1 °F (36.7 °C) (05/05/17 1827)  Pulse: 106 (05/05/17 1827)  Resp: (!) 22 (05/05/17 1827)  BP: 131/81 (05/05/17 1827)  SpO2: 95 % (05/05/17 1827) Vital Signs (24h Range):  Temp:  [98.1 °F (36.7 °C)-99.6 °F (37.6 °C)] 98.1 °F (36.7 °C)  Pulse:  [] 106  Resp:  [18-22] 22  SpO2:  [92 %-96 %] 95 %  BP: (117-176)/(58-86) 131/81        There is no height or weight on file to calculate BMI.    Physical Exam   Constitutional: He is oriented to person, place, and time. He appears well-developed and well-nourished.   Obese   HENT:   Head: Normocephalic and atraumatic.   Eyes: Conjunctivae and EOM are normal.   Neck: Normal range of motion. Neck supple.   Cardiovascular: Normal rate, regular rhythm and intact distal pulses.    Murmur heard.  Pulmonary/Chest: Effort normal. He has wheezes.   Abdominal: Soft. Bowel sounds are normal. There is no tenderness.   Musculoskeletal: Normal range of motion.   Neurological: He is oriented to person, place, and time. He has normal reflexes.   Skin: Skin is warm and dry.   Psychiatric: He has a normal mood and affect. His behavior is normal. Judgment and thought content normal.   Nursing note and vitals reviewed.       Significant Labs:   ABGs:     Recent Labs  Lab 05/05/17  1318   PH 7.380   PCO2 31.6*   HCO3 18.7*   POCSATURATED 87*   BE -6     CBC:     Recent Labs  Lab 05/05/17  1136   WBC 11.09   HGB 15.3   HCT 44.7        CMP:     Recent Labs  Lab  05/05/17  1136      K 3.9      CO2 22*   *   BUN 12   CREATININE 1.0   CALCIUM 8.8   PROT 7.4   ALBUMIN 3.2*   BILITOT 1.5*   ALKPHOS 73   AST 24   ALT 28   ANIONGAP 11   EGFRNONAA >60     Lactic Acid:     Recent Labs  Lab 05/05/17  1136   LACTATE 1.6     Troponin:     Recent Labs  Lab 05/05/17  1136   TROPONINI 0.022     Urine Studies:     Recent Labs  Lab 05/05/17  1353   COLORU Yellow   APPEARANCEUA Clear   PHUR 6.0   SPECGRAV 1.015   PROTEINUA Negative   GLUCUA Trace*   KETONESU Trace*   BILIRUBINUA Negative   OCCULTUA Negative   NITRITE Negative   UROBILINOGEN 2.0-3.0*   LEUKOCYTESUR Negative       Significant Imaging:   Imaging Results         CTA Chest Non-Coronary (PE Study) (Final result) Result time:  05/05/17 15:12:41    Final result by Max York III, MD (05/05/17 15:12:41)    Narrative:    CT angiogram of the chest.    Clinical indication: Shortness of breath. Chest pain.    Multiplanar imaging submitted. Standard and MIPS/3-D images submitted.    There is no mediastinal mass or bulky adenopathy. Heart size is normal. No hilar mass or bulky adenopathy. Small hilar nodes are identified. No axillary adenopathy.    There is opacification of the thoracic aorta with atheromatous change. No evidence of aneurysm formation or dissection. Suspect a small hiatal hernia.    There is opacification of the thoracic of the pulmonary arterial system. No gross filling defects in the major branches    The lungs show severe chronic change with diffuse emphysematous change primarily centrilobular. There is thickening of the interlobular septa as well. There is moderately severe scarring posteriorly at both lung bases and suspicion of mild superimposed basilar infiltrate and atelectatic change, greater on the right. There is bronchiectatic change greatest at the lung bases.    Within the upper portion of the abdomen, there is no liver or splenic abnormality within the visualized segments.. There  is no adrenal or pancreatic mass or enlargement. There is atheromatous change along the aorta. Diverticula are noted in the colon without gross evidence of diverticulitis within the visualized segments. Bony windows show multilevel degenerative change but no gross acute abnormality.    Impression    1. Negative for acute pulmonary emboli. Negative for thoracic aortic aneurysm or dissection.    2. Severe chronic lung disease including rather severe emphysematous change bilaterally. There also changes of bronchiectasis and basilar scarring/fibrosis. There is superimposed posterior basilar atelectasis and or infiltrate of a mild degree, greater on the right.    3. Otherwise as above.        Electronically signed by: MAX SNEED MD  Date:     05/05/17  Time:    15:12             X-Ray Chest PA And Lateral (Final result) Result time:  05/05/17 12:01:02    Final result by Max Sneed III, MD (05/05/17 12:01:02)    Impression:     Suspect minimal right basilar atelectasis or scarring. Chest is otherwise static. Granuloma right upper lung field.      Electronically signed by: MAX SNEED MD  Date:     05/05/17  Time:    12:01     Narrative:    Two-view chest x-ray.    Clinical indication: Asthma    Compared to 2010.        Heart size is normal. The lungs again demonstrate a granuloma in the right upper lung field. Currently there is minimal right basilar scarring or atelectasis. No consolidation or effusion.            Assessment/Plan:     Acute respiratory failure with hypoxia  - Admit to Med Surg  - Supplemental oxygen prn, keep O2 sats > 88%  - Scheduled inhaled medications        * COPD with exacerbation  - IV Avelox  - IV steroids  - Scheduled inhaled medications  - Supplemental oxygen prn, keep O2 sats > 88%        Hyperglycemia  - POCT BID  - HgbA1C pending  - May need low dose SSI if glucose worsens with IV steroids      VTE Risk Mitigation         Ordered     Medium Risk of VTE  Once       05/05/17 1831        ANTONIO Erazo  Department of Hospital Medicine   [facility]

## 2017-05-05 NOTE — ASSESSMENT & PLAN NOTE
- IV Avelox  - IV steroids  - Scheduled inhaled medications  - Supplemental oxygen prn, keep O2 sats > 88%

## 2017-05-06 PROBLEM — E11.9 TYPE 2 DIABETES MELLITUS: Status: ACTIVE | Noted: 2017-05-06

## 2017-05-06 LAB
ANION GAP SERPL CALC-SCNC: 8 MMOL/L
BASOPHILS # BLD AUTO: 0.01 K/UL
BASOPHILS NFR BLD: 0.1 %
BUN SERPL-MCNC: 18 MG/DL
CALCIUM SERPL-MCNC: 9.2 MG/DL
CHLORIDE SERPL-SCNC: 106 MMOL/L
CO2 SERPL-SCNC: 22 MMOL/L
CREAT SERPL-MCNC: 1 MG/DL
DIFFERENTIAL METHOD: ABNORMAL
EOSINOPHIL # BLD AUTO: 0 K/UL
EOSINOPHIL NFR BLD: 0 %
ERYTHROCYTE [DISTWIDTH] IN BLOOD BY AUTOMATED COUNT: 13.5 %
EST. GFR  (AFRICAN AMERICAN): >60 ML/MIN/1.73 M^2
EST. GFR  (NON AFRICAN AMERICAN): >60 ML/MIN/1.73 M^2
GLUCOSE SERPL-MCNC: 258 MG/DL
HCT VFR BLD AUTO: 46.5 %
HGB BLD-MCNC: 15.6 G/DL
LYMPHOCYTES # BLD AUTO: 1 K/UL
LYMPHOCYTES NFR BLD: 9 %
MCH RBC QN AUTO: 29.7 PG
MCHC RBC AUTO-ENTMCNC: 33.5 %
MCV RBC AUTO: 88 FL
MONOCYTES # BLD AUTO: 0.4 K/UL
MONOCYTES NFR BLD: 3.7 %
NEUTROPHILS # BLD AUTO: 9.8 K/UL
NEUTROPHILS NFR BLD: 87.2 %
PLATELET # BLD AUTO: 188 K/UL
PMV BLD AUTO: 10 FL
POTASSIUM SERPL-SCNC: 4.5 MMOL/L
RBC # BLD AUTO: 5.26 M/UL
SODIUM SERPL-SCNC: 136 MMOL/L
WBC # BLD AUTO: 11.22 K/UL

## 2017-05-06 PROCEDURE — 94761 N-INVAS EAR/PLS OXIMETRY MLT: CPT

## 2017-05-06 PROCEDURE — 99900035 HC TECH TIME PER 15 MIN (STAT)

## 2017-05-06 PROCEDURE — 80048 BASIC METABOLIC PNL TOTAL CA: CPT

## 2017-05-06 PROCEDURE — 21400001 HC TELEMETRY ROOM

## 2017-05-06 PROCEDURE — 25000003 PHARM REV CODE 250: Performed by: INTERNAL MEDICINE

## 2017-05-06 PROCEDURE — 11000001 HC ACUTE MED/SURG PRIVATE ROOM

## 2017-05-06 PROCEDURE — 36415 COLL VENOUS BLD VENIPUNCTURE: CPT

## 2017-05-06 PROCEDURE — 25000003 PHARM REV CODE 250: Performed by: NURSE PRACTITIONER

## 2017-05-06 PROCEDURE — 99223 1ST HOSP IP/OBS HIGH 75: CPT | Mod: ,,, | Performed by: INTERNAL MEDICINE

## 2017-05-06 PROCEDURE — 25000242 PHARM REV CODE 250 ALT 637 W/ HCPCS: Performed by: NURSE PRACTITIONER

## 2017-05-06 PROCEDURE — 63600175 PHARM REV CODE 636 W HCPCS: Performed by: NURSE PRACTITIONER

## 2017-05-06 PROCEDURE — 94640 AIRWAY INHALATION TREATMENT: CPT

## 2017-05-06 PROCEDURE — 27000221 HC OXYGEN, UP TO 24 HOURS

## 2017-05-06 PROCEDURE — 85025 COMPLETE CBC W/AUTO DIFF WBC: CPT

## 2017-05-06 PROCEDURE — 63600175 PHARM REV CODE 636 W HCPCS: Performed by: INTERNAL MEDICINE

## 2017-05-06 PROCEDURE — 96372 THER/PROPH/DIAG INJ SC/IM: CPT

## 2017-05-06 RX ORDER — IBUPROFEN 200 MG
16 TABLET ORAL
Status: DISCONTINUED | OUTPATIENT
Start: 2017-05-06 | End: 2017-05-07 | Stop reason: HOSPADM

## 2017-05-06 RX ORDER — ASPIRIN 325 MG
325 TABLET ORAL DAILY
Status: DISCONTINUED | OUTPATIENT
Start: 2017-05-06 | End: 2017-05-07 | Stop reason: HOSPADM

## 2017-05-06 RX ORDER — ESCITALOPRAM OXALATE 20 MG/1
20 TABLET ORAL DAILY
COMMUNITY

## 2017-05-06 RX ORDER — INSULIN ASPART 100 [IU]/ML
0-5 INJECTION, SOLUTION INTRAVENOUS; SUBCUTANEOUS
Status: DISCONTINUED | OUTPATIENT
Start: 2017-05-06 | End: 2017-05-06

## 2017-05-06 RX ORDER — SIMVASTATIN 5 MG/1
10 TABLET, FILM COATED ORAL DAILY
Status: DISCONTINUED | OUTPATIENT
Start: 2017-05-06 | End: 2017-05-07 | Stop reason: HOSPADM

## 2017-05-06 RX ORDER — TAMSULOSIN HYDROCHLORIDE 0.4 MG/1
0.4 CAPSULE ORAL DAILY
COMMUNITY

## 2017-05-06 RX ORDER — METFORMIN HYDROCHLORIDE 500 MG/1
500 TABLET ORAL NIGHTLY
COMMUNITY

## 2017-05-06 RX ORDER — IBUPROFEN 200 MG
24 TABLET ORAL
Status: DISCONTINUED | OUTPATIENT
Start: 2017-05-06 | End: 2017-05-07 | Stop reason: HOSPADM

## 2017-05-06 RX ORDER — INSULIN ASPART 100 [IU]/ML
1-10 INJECTION, SOLUTION INTRAVENOUS; SUBCUTANEOUS
Status: DISCONTINUED | OUTPATIENT
Start: 2017-05-06 | End: 2017-05-07 | Stop reason: HOSPADM

## 2017-05-06 RX ORDER — SIMVASTATIN 10 MG/1
10 TABLET, FILM COATED ORAL DAILY
COMMUNITY

## 2017-05-06 RX ORDER — PANTOPRAZOLE SODIUM 40 MG/1
40 TABLET, DELAYED RELEASE ORAL DAILY
COMMUNITY

## 2017-05-06 RX ORDER — TAMSULOSIN HYDROCHLORIDE 0.4 MG/1
0.4 CAPSULE ORAL DAILY
Status: DISCONTINUED | OUTPATIENT
Start: 2017-05-06 | End: 2017-05-07 | Stop reason: HOSPADM

## 2017-05-06 RX ORDER — ARFORMOTEROL TARTRATE 15 UG/2ML
15 SOLUTION RESPIRATORY (INHALATION) 2 TIMES DAILY
Status: DISCONTINUED | OUTPATIENT
Start: 2017-05-06 | End: 2017-05-07 | Stop reason: HOSPADM

## 2017-05-06 RX ORDER — GLUCAGON 1 MG
1 KIT INJECTION
Status: DISCONTINUED | OUTPATIENT
Start: 2017-05-06 | End: 2017-05-07 | Stop reason: HOSPADM

## 2017-05-06 RX ORDER — ESCITALOPRAM OXALATE 10 MG/1
20 TABLET ORAL DAILY
Status: DISCONTINUED | OUTPATIENT
Start: 2017-05-06 | End: 2017-05-07 | Stop reason: HOSPADM

## 2017-05-06 RX ORDER — ENOXAPARIN SODIUM 100 MG/ML
40 INJECTION SUBCUTANEOUS EVERY 24 HOURS
Status: DISCONTINUED | OUTPATIENT
Start: 2017-05-06 | End: 2017-05-07 | Stop reason: HOSPADM

## 2017-05-06 RX ADMIN — BUDESONIDE 0.5 MG: 0.5 SUSPENSION RESPIRATORY (INHALATION) at 07:05

## 2017-05-06 RX ADMIN — RAMELTEON 8 MG: 8 TABLET, FILM COATED ORAL at 09:05

## 2017-05-06 RX ADMIN — SIMVASTATIN 10 MG: 5 TABLET, FILM COATED ORAL at 01:05

## 2017-05-06 RX ADMIN — ENOXAPARIN SODIUM 40 MG: 100 INJECTION SUBCUTANEOUS at 05:05

## 2017-05-06 RX ADMIN — METHYLPREDNISOLONE SODIUM SUCCINATE 80 MG: 40 INJECTION, POWDER, FOR SOLUTION INTRAMUSCULAR; INTRAVENOUS at 05:05

## 2017-05-06 RX ADMIN — IPRATROPIUM BROMIDE AND ALBUTEROL SULFATE 3 ML: .5; 3 SOLUTION RESPIRATORY (INHALATION) at 07:05

## 2017-05-06 RX ADMIN — ASPIRIN 325 MG ORAL TABLET 325 MG: 325 PILL ORAL at 01:05

## 2017-05-06 RX ADMIN — INSULIN ASPART 6 UNITS: 100 INJECTION, SOLUTION INTRAVENOUS; SUBCUTANEOUS at 05:05

## 2017-05-06 RX ADMIN — METHYLPREDNISOLONE SODIUM SUCCINATE 40 MG: 40 INJECTION, POWDER, FOR SOLUTION INTRAMUSCULAR; INTRAVENOUS at 10:05

## 2017-05-06 RX ADMIN — MOXIFLOXACIN HYDROCHLORIDE 400 MG: 400 INJECTION, SOLUTION INTRAVENOUS at 08:05

## 2017-05-06 RX ADMIN — INSULIN ASPART 3 UNITS: 100 INJECTION, SOLUTION INTRAVENOUS; SUBCUTANEOUS at 09:05

## 2017-05-06 RX ADMIN — METHYLPREDNISOLONE SODIUM SUCCINATE 40 MG: 40 INJECTION, POWDER, FOR SOLUTION INTRAMUSCULAR; INTRAVENOUS at 01:05

## 2017-05-06 RX ADMIN — IPRATROPIUM BROMIDE AND ALBUTEROL SULFATE 3 ML: .5; 3 SOLUTION RESPIRATORY (INHALATION) at 04:05

## 2017-05-06 RX ADMIN — IPRATROPIUM BROMIDE AND ALBUTEROL SULFATE 3 ML: .5; 3 SOLUTION RESPIRATORY (INHALATION) at 11:05

## 2017-05-06 RX ADMIN — INSULIN ASPART 4 UNITS: 100 INJECTION, SOLUTION INTRAVENOUS; SUBCUTANEOUS at 11:05

## 2017-05-06 RX ADMIN — ESCITALOPRAM OXALATE 20 MG: 10 TABLET, FILM COATED ORAL at 01:05

## 2017-05-06 RX ADMIN — TAMSULOSIN HYDROCHLORIDE 0.4 MG: 0.4 CAPSULE ORAL at 01:05

## 2017-05-06 RX ADMIN — INSULIN DETEMIR 10 UNITS: 100 INJECTION, SOLUTION SUBCUTANEOUS at 09:05

## 2017-05-06 RX ADMIN — ARFORMOTEROL TARTRATE 15 MCG: 15 SOLUTION RESPIRATORY (INHALATION) at 07:05

## 2017-05-06 NOTE — PLAN OF CARE
Problem: Patient Care Overview  Goal: Plan of Care Review  Outcome: Ongoing (interventions implemented as appropriate)  POC reviewed with patient. Indications for medications and possible side effects explained. Pt verbalized understanding. Antibiotics administered per order without any adverse reactions noted. VS stable; SpO2 94% on 2 L via NC. Pt NSR on cardiac monitor. In no acute distress; will continue to monitor. 24 hour chart check complete.

## 2017-05-06 NOTE — CONSULTS
Consult Note    Inpatient consult to Pulmonology  Consult performed by: LINA LAI  Consult ordered by: YAA NANCE        SUBJECTIVE:     History of Present Illness:  Patient is a 76 y.o. male presents with Cough, SOB wheezing  Known COPD seen By Dr Smart.  Needs prevnar 13  I have reviewed the patient's medical history in detail and updated the computerized patient record.  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 No pertinent surgical history.  History reviewed. No pertinent family history.  Social History   Substance Use Topics    Smoking status: Former Smoker    Smokeless tobacco: Current User     Types: Snuff    Alcohol use Yes     Review of Systems   Constitutional: Positive for malaise/fatigue.   HENT: Negative.    Eyes: Negative.    Respiratory: Positive for cough, shortness of breath and wheezing.    Cardiovascular: Negative for leg swelling.   Gastrointestinal: Negative.    Genitourinary: Negative.  Negative for dysuria.   Musculoskeletal: Negative.    Skin: Negative.    Neurological: Negative.    Endo/Heme/Allergies: Negative.    Psychiatric/Behavioral: Negative.      OBJECTIVE:     Vital Signs:  Temp:  [97.8 °F (36.6 °C)-98.3 °F (36.8 °C)]   Pulse:  []   Resp:  [18-20]   BP: (124-142)/(68-76)   SpO2:  [90 %-95 %]     Physical Exam   Constitutional: He is oriented to person, place, and time. He appears well-developed and well-nourished. No distress.   HENT:   Head: Normocephalic and atraumatic.   Nose: Nose normal.   Mouth/Throat: Oropharynx is clear and moist. No oropharyngeal exudate.   Eyes: Conjunctivae and EOM are normal. Pupils are equal, round, and  reactive to light.   Neck: Normal range of motion. Neck supple. No JVD present. No tracheal deviation present. No thyromegaly present.   Cardiovascular: Normal rate and regular rhythm.  Exam reveals no friction rub.    No murmur heard.  Pulmonary/Chest: No respiratory distress. He has wheezes. He has no rales. He exhibits no tenderness.   Abdominal: Soft. Bowel sounds are normal. He exhibits no distension. There is no tenderness.   Musculoskeletal: Normal range of motion. He exhibits no edema.   Neurological: He is alert and oriented to person, place, and time. He has normal reflexes. No cranial nerve deficit.   Skin: Skin is warm and dry. No rash noted.   Psychiatric: He has a normal mood and affect. His behavior is normal.   Nursing note and vitals reviewed.    Laboratory:  CBC:   Recent Labs  Lab 05/06/17  0548   WBC 11.22   RBC 5.26   HGB 15.6   HCT 46.5      MCV 88   MCH 29.7   MCHC 33.5     CMP:   Recent Labs  Lab 05/05/17  1136 05/06/17  0548   * 258*   CALCIUM 8.8 9.2   ALBUMIN 3.2*  --    PROT 7.4  --     136   K 3.9 4.5   CO2 22* 22*    106   BUN 12 18   CREATININE 1.0 1.0   ALKPHOS 73  --    ALT 28  --    AST 24  --    BILITOT 1.5*  --      ABGs:   Recent Labs  Lab 05/05/17  1318   PH 7.380   PCO2 31.6*   PO2 53*   HCO3 18.7*   POCSATURATED 87*   BE -6       Diagnostic Results:  X-Ray: Reviewed  Heart size is normal. The lungs again demonstrate a granuloma in the right upper lung field. Currently there is minimal right basilar scarring or atelectasis. No consolidation or effusion.   Impression    Suspect minimal right basilar atelectasis or scarring. Chest is otherwise static. Granuloma right upper lung field.       CT: Chest     The lungs show severe chronic change with diffuse emphysematous change primarily centrilobular. There is thickening of the interlobular septa as well. There is moderately severe scarring posteriorly at both lung bases and suspicion of mild superimposed  basilar infiltrate and atelectatic change, greater on the right. There is bronchiectatic change greatest at the lung bases.    Within the upper portion of the abdomen, there is no liver or splenic abnormality within the visualized segments.. There is no adrenal or pancreatic mass or enlargement. There is atheromatous change along the aorta. Diverticula are noted in the colon without gross evidence of diverticulitis within the visualized segments. Bony windows show multilevel degenerative change but no gross acute abnormality.    Impression    1. Negative for acute pulmonary emboli. Negative for thoracic aortic aneurysm or dissection.    2. Severe chronic lung disease including rather severe emphysematous change bilaterally. There also changes of bronchiectasis and basilar scarring/fibrosis. There is superimposed posterior basilar atelectasis and or infiltrate of a mild degree, greater on the right.    ASSESSMENT/PLAN:     Problem   Copd With Exacerbation   Acute Respiratory Failure With Hypoxia       Plan:  Prevnar 13 vaccination on discharge  IV Avelox  Bronchodilators and inhales Budesonide  DVT prophylaxis  Solumedrol 40 mg TID  Will see in Office 2-3 weeks Spirometry    Thank you for the courtesy of participating in the care of this patient    Mir Joaquin MD

## 2017-05-06 NOTE — PLAN OF CARE
No sob reported at rest, pt instructed to keep 2L nasal cannula on. Pt remains above 90% on O2 sat. Dyspnea on exertion. Clear but diminished lung sounds, abdominal muscle use. NSR on tele. Denies any other issues. POCT monitored, coverage given as needed. Chart check done. Will cont to monitor.

## 2017-05-06 NOTE — ASSESSMENT & PLAN NOTE
>>ASSESSMENT AND PLAN FOR TYPE 2 DIABETES MELLITUS WRITTEN ON 5/6/2017  3:39 PM BY YAA NANCE, ANTONIO    - Accuchecks and moderate SSI  - Add Detemir 10 units SQ daily  - Increase in blood glucose due to IV steroids  - Will continue to monitor

## 2017-05-06 NOTE — ASSESSMENT & PLAN NOTE
- Accuchecks and moderate SSI  - Add Detemir 10 units SQ daily  - Increase in blood glucose due to IV steroids  - Will continue to monitor

## 2017-05-06 NOTE — ASSESSMENT & PLAN NOTE
- Pulmonology consulted -- per patient's request  - Prevnar 13 vaccination on discharge  - IV Avelox  - IV steroids  - Scheduled inhaled medications  - Supplemental oxygen prn, keep O2 sats > 88%

## 2017-05-06 NOTE — PLAN OF CARE
Problem: Patient Care Overview  Goal: Plan of Care Review  Outcome: Ongoing (interventions implemented as appropriate)  Fall precautions maintained, pt free from injuries/fall, repositions self in bed, up w/ assist, dyspnea noted on exertion, denies sob at rest, pt comfortable. Denies pain, nausea, vomiting. On 2L O2. IV abx given as ordered. NSR on tele. POCT monitored, coverage given as needed. POC and meds reviewed w/ pt, pt verbalizes understanding. Chart check done. Will cont to monitor.

## 2017-05-06 NOTE — PROGRESS NOTES
Home Oxygen Evaluation    Date Performed: 5/6/2017    1) Patient's Home O2 Sat on room air, while at rest: 87        If O2 sats on room air at rest are 88% or below, patient qualifies. No additional testing needed. Document N/A in steps 2 and 3. If 89% or above, complete steps 2.      2) Patient's O2 Sat on room air while exercising: n/a        If O2 sats on room air while exercising remain 89% or above patient does not qualify, no further testing needed Document N/A in step 3. If O2 sats on room air while exercising are 88% or below, continue to step 3.      3) Patient's O2 Sat while exercising on O2:  at  LPM         (Must show improvement from #2 for patients to qualify)    If O2 sats improve on oxygen, patient qualifies for portable oxygen. If not, the patient does not qualify.

## 2017-05-06 NOTE — NURSING
Pt mentioned he is diabetic and checks blood sugar at home, notified Jessica Barrera NP, new orders received.

## 2017-05-06 NOTE — PROGRESS NOTES
[facility] Hospital Medicine  Progress Note    Patient Name: Stone Reveles  MRN: [id number]  Patient Class: IP- Inpatient   Admission Date: 5/5/2017  Length of Stay: 1 days  Attending Physician: Edwin Jackman MD  Primary Care Provider: No primary care provider on file.        Subjective:     Principal Problem:COPD with exacerbation    HPI:  Stone Reveles is a 76 year old male with a PMHx of DM, COPD, and HLD who presented to the Emergency Department with c/o SOB x 4 days. Associated symptoms include: subjective fever and cough. Pt states taking ibuprofen and nebulizer with partial relief. ED workup revealed: glucose 152, total bilirubin 1.5. ABG PO2 53. CTA chest with no PE, negative for aortic aneurysm or dissection, severe chronic lung disease. Pt given Duoneb x 3 and Solumedrol 125 mg IVP x 1 in ED. Pt admitted to Med Surg for Acute hypoxic respiratory failure secondary to acute COPD exacerbation.    Hospital Course:  Stone Reveles is a 76 year old male admitted for Acute COPD exacerbation. Pt currently receiving IV Avelox, IV steroids, and inhaled medications. Pt reports symptoms improving. Pt and daughter requesting pulmonology consult with Dr. Joaquin specifically. May need home O2 evaluation prior to discharge, which may be possible in AM.     Interval History: Pt seen and examined. Daughter at bedside. Pt reports symptoms improving. Pt has no complaints at this time.     Review of Systems   Constitutional: Positive for activity change. Negative for appetite change, chills, fatigue and fever.   HENT: Negative for tinnitus and trouble swallowing.    Eyes: Negative.    Respiratory: Positive for cough and shortness of breath. Negative for apnea, choking, chest tightness, wheezing and stridor.    Cardiovascular: Negative for chest pain, palpitations and leg swelling.   Gastrointestinal: Negative for abdominal pain, blood in stool, constipation, diarrhea, nausea and vomiting.   Endocrine:  Negative.    Genitourinary: Negative.    Musculoskeletal: Negative.    Skin: Negative.    Allergic/Immunologic: Negative.    Neurological: Negative for dizziness, tremors, seizures, syncope, facial asymmetry, speech difficulty, weakness, light-headedness, numbness and headaches.   Hematological: Negative.    Psychiatric/Behavioral: Negative.      Objective:     Vital Signs (Most Recent):  Temp: 97.8 °F (36.6 °C) (05/06/17 0815)  Pulse: 103 (05/06/17 1231)  Resp: 18 (05/06/17 1231)  BP: 124/72 (05/06/17 1231)  SpO2: (!) 92 % (05/06/17 1231) Vital Signs (24h Range):  Temp:  [97.8 °F (36.6 °C)-98.4 °F (36.9 °C)] 97.8 °F (36.6 °C)  Pulse:  [] 103  Resp:  [17-22] 18  SpO2:  [90 %-96 %] 92 %  BP: (124-147)/(68-81) 124/72     Weight: 98.9 kg (218 lb)  Body mass index is 29.57 kg/(m^2).    Intake/Output Summary (Last 24 hours) at 05/06/17 1533  Last data filed at 05/06/17 1357   Gross per 24 hour   Intake              650 ml   Output                0 ml   Net              650 ml      Physical Exam   Constitutional: He is oriented to person, place, and time. He appears well-developed and well-nourished.   Obese   HENT:   Head: Normocephalic and atraumatic.   Eyes: Conjunctivae and EOM are normal.   Neck: Normal range of motion. Neck supple.   Cardiovascular: Normal rate, regular rhythm and intact distal pulses.    Murmur heard.  Pulmonary/Chest: Effort normal. He has wheezes.   Expiratory wheezing noted posteriorly   Abdominal: Soft. Bowel sounds are normal. There is no tenderness.   Musculoskeletal: Normal range of motion.   Neurological: He is oriented to person, place, and time. He has normal reflexes.   Skin: Skin is warm and dry.   Psychiatric: He has a normal mood and affect. His behavior is normal. Judgment and thought content normal.   Nursing note and vitals reviewed.      Significant Labs:   BMP:   Recent Labs  Lab 05/06/17  0548   *      K 4.5      CO2 22*   BUN 18   CREATININE 1.0    CALCIUM 9.2     CBC:   Recent Labs  Lab 05/05/17  1136 05/06/17  0548   WBC 11.09 11.22   HGB 15.3 15.6   HCT 44.7 46.5    188       Significant Imaging:   Imaging Results         CTA Chest Non-Coronary (PE Study) (Final result) Result time:  05/05/17 15:12:41    Final result by Max York III, MD (05/05/17 15:12:41)    Narrative:    CT angiogram of the chest.    Clinical indication: Shortness of breath. Chest pain.    Multiplanar imaging submitted. Standard and MIPS/3-D images submitted.    There is no mediastinal mass or bulky adenopathy. Heart size is normal. No hilar mass or bulky adenopathy. Small hilar nodes are identified. No axillary adenopathy.    There is opacification of the thoracic aorta with atheromatous change. No evidence of aneurysm formation or dissection. Suspect a small hiatal hernia.    There is opacification of the thoracic of the pulmonary arterial system. No gross filling defects in the major branches    The lungs show severe chronic change with diffuse emphysematous change primarily centrilobular. There is thickening of the interlobular septa as well. There is moderately severe scarring posteriorly at both lung bases and suspicion of mild superimposed basilar infiltrate and atelectatic change, greater on the right. There is bronchiectatic change greatest at the lung bases.    Within the upper portion of the abdomen, there is no liver or splenic abnormality within the visualized segments.. There is no adrenal or pancreatic mass or enlargement. There is atheromatous change along the aorta. Diverticula are noted in the colon without gross evidence of diverticulitis within the visualized segments. Bony windows show multilevel degenerative change but no gross acute abnormality.    Impression    1. Negative for acute pulmonary emboli. Negative for thoracic aortic aneurysm or dissection.    2. Severe chronic lung disease including rather severe emphysematous change bilaterally.  There also changes of bronchiectasis and basilar scarring/fibrosis. There is superimposed posterior basilar atelectasis and or infiltrate of a mild degree, greater on the right.    3. Otherwise as above.        Electronically signed by: MAX SNEED MD  Date:     05/05/17  Time:    15:12             X-Ray Chest PA And Lateral (Final result) Result time:  05/05/17 12:01:02    Final result by Max Sneed III, MD (05/05/17 12:01:02)    Impression:     Suspect minimal right basilar atelectasis or scarring. Chest is otherwise static. Granuloma right upper lung field.      Electronically signed by: MAX SNEED MD  Date:     05/05/17  Time:    12:01     Narrative:    Two-view chest x-ray.    Clinical indication: Asthma    Compared to 2010.        Heart size is normal. The lungs again demonstrate a granuloma in the right upper lung field. Currently there is minimal right basilar scarring or atelectasis. No consolidation or effusion.            Assessment/Plan:      Acute respiratory failure with hypoxia  - Supplemental oxygen prn, keep O2 sats > 88%  - Scheduled inhaled medications        * COPD with exacerbation  - Pulmonology consulted -- per patient's request  - Prevnar 13 vaccination on discharge  - IV Avelox  - IV steroids  - Scheduled inhaled medications  - Supplemental oxygen prn, keep O2 sats > 88%        Type 2 diabetes mellitus  - Accuchecks and moderate SSI  - Add Detemir 10 units SQ daily  - Increase in blood glucose due to IV steroids  - Will continue to monitor      VTE Risk Mitigation         Ordered     enoxaparin injection 40 mg  Daily     Route:  Subcutaneous        05/06/17 1314     Medium Risk of VTE  Once      05/05/17 1831          ANTONIO Erazo  Department of Hospital Medicine   [facility]

## 2017-05-06 NOTE — SUBJECTIVE & OBJECTIVE
Interval History: Pt seen and examined. Daughter at bedside. Pt reports symptoms improving. Pt has no complaints at this time.     Review of Systems   Constitutional: Positive for activity change. Negative for appetite change, chills, fatigue and fever.   HENT: Negative for tinnitus and trouble swallowing.    Eyes: Negative.    Respiratory: Positive for cough and shortness of breath. Negative for apnea, choking, chest tightness, wheezing and stridor.    Cardiovascular: Negative for chest pain, palpitations and leg swelling.   Gastrointestinal: Negative for abdominal pain, blood in stool, constipation, diarrhea, nausea and vomiting.   Endocrine: Negative.    Genitourinary: Negative.    Musculoskeletal: Negative.    Skin: Negative.    Allergic/Immunologic: Negative.    Neurological: Negative for dizziness, tremors, seizures, syncope, facial asymmetry, speech difficulty, weakness, light-headedness, numbness and headaches.   Hematological: Negative.    Psychiatric/Behavioral: Negative.      Objective:     Vital Signs (Most Recent):  Temp: 97.8 °F (36.6 °C) (05/06/17 0815)  Pulse: 103 (05/06/17 1231)  Resp: 18 (05/06/17 1231)  BP: 124/72 (05/06/17 1231)  SpO2: (!) 92 % (05/06/17 1231) Vital Signs (24h Range):  Temp:  [97.8 °F (36.6 °C)-98.4 °F (36.9 °C)] 97.8 °F (36.6 °C)  Pulse:  [] 103  Resp:  [17-22] 18  SpO2:  [90 %-96 %] 92 %  BP: (124-147)/(68-81) 124/72     Weight: 98.9 kg (218 lb)  Body mass index is 29.57 kg/(m^2).    Intake/Output Summary (Last 24 hours) at 05/06/17 1533  Last data filed at 05/06/17 1357   Gross per 24 hour   Intake              650 ml   Output                0 ml   Net              650 ml      Physical Exam   Constitutional: He is oriented to person, place, and time. He appears well-developed and well-nourished.   Obese   HENT:   Head: Normocephalic and atraumatic.   Eyes: Conjunctivae and EOM are normal.   Neck: Normal range of motion. Neck supple.   Cardiovascular: Normal rate, regular  rhythm and intact distal pulses.    Murmur heard.  Pulmonary/Chest: Effort normal. He has wheezes.   Expiratory wheezing noted posteriorly   Abdominal: Soft. Bowel sounds are normal. There is no tenderness.   Musculoskeletal: Normal range of motion.   Neurological: He is oriented to person, place, and time. He has normal reflexes.   Skin: Skin is warm and dry.   Psychiatric: He has a normal mood and affect. His behavior is normal. Judgment and thought content normal.   Nursing note and vitals reviewed.      Significant Labs:   BMP:   Recent Labs  Lab 05/06/17  0548   *      K 4.5      CO2 22*   BUN 18   CREATININE 1.0   CALCIUM 9.2     CBC:   Recent Labs  Lab 05/05/17  1136 05/06/17  0548   WBC 11.09 11.22   HGB 15.3 15.6   HCT 44.7 46.5    188       Significant Imaging:   Imaging Results         CTA Chest Non-Coronary (PE Study) (Final result) Result time:  05/05/17 15:12:41    Final result by Max York III, MD (05/05/17 15:12:41)    Narrative:    CT angiogram of the chest.    Clinical indication: Shortness of breath. Chest pain.    Multiplanar imaging submitted. Standard and MIPS/3-D images submitted.    There is no mediastinal mass or bulky adenopathy. Heart size is normal. No hilar mass or bulky adenopathy. Small hilar nodes are identified. No axillary adenopathy.    There is opacification of the thoracic aorta with atheromatous change. No evidence of aneurysm formation or dissection. Suspect a small hiatal hernia.    There is opacification of the thoracic of the pulmonary arterial system. No gross filling defects in the major branches    The lungs show severe chronic change with diffuse emphysematous change primarily centrilobular. There is thickening of the interlobular septa as well. There is moderately severe scarring posteriorly at both lung bases and suspicion of mild superimposed basilar infiltrate and atelectatic change, greater on the right. There is bronchiectatic  change greatest at the lung bases.    Within the upper portion of the abdomen, there is no liver or splenic abnormality within the visualized segments.. There is no adrenal or pancreatic mass or enlargement. There is atheromatous change along the aorta. Diverticula are noted in the colon without gross evidence of diverticulitis within the visualized segments. Bony windows show multilevel degenerative change but no gross acute abnormality.    Impression    1. Negative for acute pulmonary emboli. Negative for thoracic aortic aneurysm or dissection.    2. Severe chronic lung disease including rather severe emphysematous change bilaterally. There also changes of bronchiectasis and basilar scarring/fibrosis. There is superimposed posterior basilar atelectasis and or infiltrate of a mild degree, greater on the right.    3. Otherwise as above.        Electronically signed by: MAX SNEED MD  Date:     05/05/17  Time:    15:12             X-Ray Chest PA And Lateral (Final result) Result time:  05/05/17 12:01:02    Final result by Max Sneed III, MD (05/05/17 12:01:02)    Impression:     Suspect minimal right basilar atelectasis or scarring. Chest is otherwise static. Granuloma right upper lung field.      Electronically signed by: MAX SNEED MD  Date:     05/05/17  Time:    12:01     Narrative:    Two-view chest x-ray.    Clinical indication: Asthma    Compared to 2010.        Heart size is normal. The lungs again demonstrate a granuloma in the right upper lung field. Currently there is minimal right basilar scarring or atelectasis. No consolidation or effusion.

## 2017-05-07 VITALS
RESPIRATION RATE: 20 BRPM | WEIGHT: 218 LBS | DIASTOLIC BLOOD PRESSURE: 70 MMHG | HEIGHT: 72 IN | HEART RATE: 122 BPM | OXYGEN SATURATION: 84 % | BODY MASS INDEX: 29.53 KG/M2 | TEMPERATURE: 98 F | SYSTOLIC BLOOD PRESSURE: 143 MMHG

## 2017-05-07 LAB
ANION GAP SERPL CALC-SCNC: 8 MMOL/L
BASOPHILS # BLD AUTO: 0.01 K/UL
BASOPHILS NFR BLD: 0.1 %
BUN SERPL-MCNC: 29 MG/DL
CALCIUM SERPL-MCNC: 8.8 MG/DL
CHLORIDE SERPL-SCNC: 108 MMOL/L
CO2 SERPL-SCNC: 22 MMOL/L
CREAT SERPL-MCNC: 1 MG/DL
DIFFERENTIAL METHOD: ABNORMAL
EOSINOPHIL # BLD AUTO: 0 K/UL
EOSINOPHIL NFR BLD: 0.1 %
ERYTHROCYTE [DISTWIDTH] IN BLOOD BY AUTOMATED COUNT: 14 %
EST. GFR  (AFRICAN AMERICAN): >60 ML/MIN/1.73 M^2
EST. GFR  (NON AFRICAN AMERICAN): >60 ML/MIN/1.73 M^2
ESTIMATED AVG GLUCOSE: 163 MG/DL
GLUCOSE SERPL-MCNC: 196 MG/DL
HBA1C MFR BLD HPLC: 7.3 %
HCT VFR BLD AUTO: 41.3 %
HGB BLD-MCNC: 14.1 G/DL
LYMPHOCYTES # BLD AUTO: 0.8 K/UL
LYMPHOCYTES NFR BLD: 6 %
MCH RBC QN AUTO: 30.2 PG
MCHC RBC AUTO-ENTMCNC: 34.1 %
MCV RBC AUTO: 88 FL
MONOCYTES # BLD AUTO: 0.5 K/UL
MONOCYTES NFR BLD: 3.6 %
NEUTROPHILS # BLD AUTO: 11.3 K/UL
NEUTROPHILS NFR BLD: 90.2 %
PLATELET # BLD AUTO: 212 K/UL
PMV BLD AUTO: 9.8 FL
POCT GLUCOSE: 199 MG/DL (ref 70–110)
POCT GLUCOSE: 328 MG/DL (ref 70–110)
POTASSIUM SERPL-SCNC: 4.7 MMOL/L
RBC # BLD AUTO: 4.67 M/UL
SODIUM SERPL-SCNC: 138 MMOL/L
WBC # BLD AUTO: 12.53 K/UL

## 2017-05-07 PROCEDURE — 85025 COMPLETE CBC W/AUTO DIFF WBC: CPT

## 2017-05-07 PROCEDURE — 80048 BASIC METABOLIC PNL TOTAL CA: CPT

## 2017-05-07 PROCEDURE — 3E0234Z INTRODUCTION OF SERUM, TOXOID AND VACCINE INTO MUSCLE, PERCUTANEOUS APPROACH: ICD-10-PCS | Performed by: INTERNAL MEDICINE

## 2017-05-07 PROCEDURE — 90670 PCV13 VACCINE IM: CPT | Performed by: INTERNAL MEDICINE

## 2017-05-07 PROCEDURE — 36415 COLL VENOUS BLD VENIPUNCTURE: CPT

## 2017-05-07 PROCEDURE — 90471 IMMUNIZATION ADMIN: CPT | Performed by: INTERNAL MEDICINE

## 2017-05-07 PROCEDURE — 25000003 PHARM REV CODE 250: Performed by: NURSE PRACTITIONER

## 2017-05-07 PROCEDURE — 27000221 HC OXYGEN, UP TO 24 HOURS

## 2017-05-07 PROCEDURE — 63600175 PHARM REV CODE 636 W HCPCS: Performed by: INTERNAL MEDICINE

## 2017-05-07 PROCEDURE — 96372 THER/PROPH/DIAG INJ SC/IM: CPT

## 2017-05-07 PROCEDURE — 94761 N-INVAS EAR/PLS OXIMETRY MLT: CPT

## 2017-05-07 PROCEDURE — 25000242 PHARM REV CODE 250 ALT 637 W/ HCPCS: Performed by: NURSE PRACTITIONER

## 2017-05-07 PROCEDURE — 94640 AIRWAY INHALATION TREATMENT: CPT

## 2017-05-07 PROCEDURE — 25000003 PHARM REV CODE 250: Performed by: INTERNAL MEDICINE

## 2017-05-07 PROCEDURE — 99232 SBSQ HOSP IP/OBS MODERATE 35: CPT | Mod: ,,, | Performed by: INTERNAL MEDICINE

## 2017-05-07 PROCEDURE — G0009 ADMIN PNEUMOCOCCAL VACCINE: HCPCS | Performed by: INTERNAL MEDICINE

## 2017-05-07 RX ORDER — PREDNISONE 20 MG/1
20 TABLET ORAL SEE ADMIN INSTRUCTIONS
Qty: 30 TABLET | Refills: 1 | Status: SHIPPED | OUTPATIENT
Start: 2017-05-07 | End: 2017-05-07

## 2017-05-07 RX ORDER — ALBUTEROL SULFATE 0.83 MG/ML
2.5 SOLUTION RESPIRATORY (INHALATION)
Qty: 1 BOX | Refills: 3 | Status: SHIPPED | OUTPATIENT
Start: 2017-05-07 | End: 2017-05-07

## 2017-05-07 RX ORDER — PREDNISONE 20 MG/1
20 TABLET ORAL SEE ADMIN INSTRUCTIONS
Qty: 30 TABLET | Refills: 1 | Status: SHIPPED | OUTPATIENT
Start: 2017-05-07 | End: 2018-01-03

## 2017-05-07 RX ORDER — ALBUTEROL SULFATE 0.83 MG/ML
2.5 SOLUTION RESPIRATORY (INHALATION)
Qty: 1 BOX | Refills: 3 | Status: SHIPPED | OUTPATIENT
Start: 2017-05-07 | End: 2018-01-03 | Stop reason: SDUPTHER

## 2017-05-07 RX ORDER — LEVOFLOXACIN 750 MG/1
750 TABLET ORAL DAILY
Qty: 5 TABLET | Refills: 0 | Status: SHIPPED | OUTPATIENT
Start: 2017-05-07 | End: 2017-05-12

## 2017-05-07 RX ADMIN — METHYLPREDNISOLONE SODIUM SUCCINATE 40 MG: 40 INJECTION, POWDER, FOR SOLUTION INTRAMUSCULAR; INTRAVENOUS at 12:05

## 2017-05-07 RX ADMIN — METHYLPREDNISOLONE SODIUM SUCCINATE 40 MG: 40 INJECTION, POWDER, FOR SOLUTION INTRAMUSCULAR; INTRAVENOUS at 06:05

## 2017-05-07 RX ADMIN — PNEUMOCOCCAL 13-VALENT CONJUGATE VACCINE 0.5 ML: 2.2; 2.2; 2.2; 2.2; 2.2; 4.4; 2.2; 2.2; 2.2; 2.2; 2.2; 2.2; 2.2 INJECTION, SUSPENSION INTRAMUSCULAR at 12:05

## 2017-05-07 RX ADMIN — IPRATROPIUM BROMIDE AND ALBUTEROL SULFATE 3 ML: .5; 3 SOLUTION RESPIRATORY (INHALATION) at 07:05

## 2017-05-07 RX ADMIN — ASPIRIN 325 MG ORAL TABLET 325 MG: 325 PILL ORAL at 08:05

## 2017-05-07 RX ADMIN — SIMVASTATIN 10 MG: 5 TABLET, FILM COATED ORAL at 08:05

## 2017-05-07 RX ADMIN — INSULIN ASPART 2 UNITS: 100 INJECTION, SOLUTION INTRAVENOUS; SUBCUTANEOUS at 06:05

## 2017-05-07 RX ADMIN — ARFORMOTEROL TARTRATE 15 MCG: 15 SOLUTION RESPIRATORY (INHALATION) at 07:05

## 2017-05-07 RX ADMIN — ESCITALOPRAM OXALATE 20 MG: 10 TABLET, FILM COATED ORAL at 08:05

## 2017-05-07 RX ADMIN — TAMSULOSIN HYDROCHLORIDE 0.4 MG: 0.4 CAPSULE ORAL at 08:05

## 2017-05-07 RX ADMIN — BUDESONIDE 0.5 MG: 0.5 SUSPENSION RESPIRATORY (INHALATION) at 07:05

## 2017-05-07 NOTE — PROGRESS NOTES
CM contacted Aneta Moore ([phone]) with [employer] about home oxygen.  Rep confirmed oxygen can be serviced and delivered to the pt in the hospital today.  CM will send order and complete referral.      THERESA followed up with Atilio with [employer] who informed THERESA that company does not deliver on Sundays.    CM contacted [employer] on-call line and spoke with Mari who was able to take order and confirmed that a  will deliver equipment to hospital for pt today.  CM faxed order to [employer].   will follow up with THERESA with estimated delivery time.

## 2017-05-07 NOTE — DISCHARGE SUMMARY
[facility] Hospital Medicine  Discharge Summary      Patient Name: Stone Reveles  MRN: [id number]  Admission Date: 5/5/2017  Hospital Length of Stay: 2 days  Discharge Date and Time:  05/07/2017 11:08 AM  Attending Physician: Edwin Jackman MD   Discharging Provider: ANTONIO Erazo  Primary Care Provider: Primary Doctor No      HPI:   Stone Reveles is a 76 year old male with a PMHx of DM, COPD, and HLD who presented to the Emergency Department with c/o SOB x 4 days. Associated symptoms include: subjective fever and cough. Pt states taking ibuprofen and nebulizer with partial relief. ED workup revealed: glucose 152, total bilirubin 1.5. ABG PO2 53. CTA chest with no PE, negative for aortic aneurysm or dissection, severe chronic lung disease. Pt given Duoneb x 3 and Solumedrol 125 mg IVP x 1 in ED. Pt admitted to Med Surg for Acute hypoxic respiratory failure secondary to acute COPD exacerbation.    * No surgery found *      Indwelling Lines/Drains at time of discharge:   Lines/Drains/Airways          No matching active lines, drains, or airways        Hospital Course:   Stone Reveles is a 76 year old male admitted for Acute COPD exacerbation. Pt received IV Avelox, IV steroids, and inhaled medications during hospitalization. Pulmonology consulted during admission per patient request. Pt qualified for home oxygen prior to discharge, 87% on room air. Pt reports symptoms have improved. Prevnar vaccination prior to discharge. Discharge prescriptions include: Levaquin 750 mg PO daily x 5 days and prednisone taper. Due to patient's pharmacy closed today, 05/07/17, prescriptions printed out and given to patient. Pt to follow up with Dr. Joaquin (pulmonology) one week after discharge for hospital follow up. Pt seen and examined on the date of discharge and determined suitable for discharge.      Consults:   Consults         Status Ordering Provider     Inpatient consult to Pulmonology  Once      Provider:  Mir Joaquin MD    Completed YAA NANCE          Significant Diagnostic Studies: Labs:   BMP:   Recent Labs  Lab 05/05/17  1136 05/06/17  0548 05/07/17  0631   * 258* 196*    136 138   K 3.9 4.5 4.7    106 108   CO2 22* 22* 22*   BUN 12 18 29*   CREATININE 1.0 1.0 1.0   CALCIUM 8.8 9.2 8.8    and CBC   Recent Labs  Lab 05/05/17  1136 05/06/17  0548 05/07/17  0631   WBC 11.09 11.22 12.53   HGB 15.3 15.6 14.1   HCT 44.7 46.5 41.3    188 212     Radiology:   Imaging Results         CTA Chest Non-Coronary (PE Study) (Final result) Result time:  05/05/17 15:12:41    Final result by Max York III, MD (05/05/17 15:12:41)    Narrative:    CT angiogram of the chest.    Clinical indication: Shortness of breath. Chest pain.    Multiplanar imaging submitted. Standard and MIPS/3-D images submitted.    There is no mediastinal mass or bulky adenopathy. Heart size is normal. No hilar mass or bulky adenopathy. Small hilar nodes are identified. No axillary adenopathy.    There is opacification of the thoracic aorta with atheromatous change. No evidence of aneurysm formation or dissection. Suspect a small hiatal hernia.    There is opacification of the thoracic of the pulmonary arterial system. No gross filling defects in the major branches    The lungs show severe chronic change with diffuse emphysematous change primarily centrilobular. There is thickening of the interlobular septa as well. There is moderately severe scarring posteriorly at both lung bases and suspicion of mild superimposed basilar infiltrate and atelectatic change, greater on the right. There is bronchiectatic change greatest at the lung bases.    Within the upper portion of the abdomen, there is no liver or splenic abnormality within the visualized segments.. There is no adrenal or pancreatic mass or enlargement. There is atheromatous change along the aorta. Diverticula are noted in the colon without  gross evidence of diverticulitis within the visualized segments. Bony windows show multilevel degenerative change but no gross acute abnormality.    Impression    1. Negative for acute pulmonary emboli. Negative for thoracic aortic aneurysm or dissection.    2. Severe chronic lung disease including rather severe emphysematous change bilaterally. There also changes of bronchiectasis and basilar scarring/fibrosis. There is superimposed posterior basilar atelectasis and or infiltrate of a mild degree, greater on the right.    3. Otherwise as above.        Electronically signed by: MAX SNEED MD  Date:     05/05/17  Time:    15:12             X-Ray Chest PA And Lateral (Final result) Result time:  05/05/17 12:01:02    Final result by Max Sneed III, MD (05/05/17 12:01:02)    Impression:     Suspect minimal right basilar atelectasis or scarring. Chest is otherwise static. Granuloma right upper lung field.      Electronically signed by: MAX SNEED MD  Date:     05/05/17  Time:    12:01     Narrative:    Two-view chest x-ray.    Clinical indication: Asthma    Compared to 2010.        Heart size is normal. The lungs again demonstrate a granuloma in the right upper lung field. Currently there is minimal right basilar scarring or atelectasis. No consolidation or effusion.              Pending Diagnostic Studies:     None        Final Active Diagnoses:    Diagnosis Date Noted POA    PRINCIPAL PROBLEM:  COPD with exacerbation [J44.1] 05/05/2017 Yes    Acute respiratory failure with hypoxia [J96.01] 05/05/2017 Yes    Type 2 diabetes mellitus [E11.9] 05/06/2017 Yes      Problems Resolved During this Admission:    Diagnosis Date Noted Date Resolved POA        Discharged Condition: stable    Disposition: Home or Self Care    Follow Up:  Follow-up Information     Follow up with Mir Joaquin MD. Schedule an appointment as soon as possible for a visit in 1 week.    Specialty:  Pulmonary Disease     Why:  hospital follow up    Contact information:    [address]  [place] [address]  [phone]          Patient Instructions:     OXYGEN FOR HOME USE   Order Specific Question Answer Comments   Liter Flow 2    Duration Continuous    Qualifying SpO2: 87    Testing done at: Rest    Route nasal cannula    Portable mode: pulse dose acceptable    Device home concentrator with portable unit    Length of need (in months): 99 mos    Patient condition with qualifying saturation COPD    Height: 6' (1.829 m)    Weight: 98.9 kg (218 lb)    Alternative treatment measures have been tried or considered and deemed clinically ineffective. Yes      Diet general   Order Specific Question Answer Comments   Additional restrictions: Diabetic 1800      Activity as tolerated     Call MD for:  increased confusion or weakness     Call MD for:  persistent dizziness, light-headedness, or visual disturbances     Call MD for:  difficulty breathing or increased cough       Medications:  Reconciled Home Medications:   Current Discharge Medication List      START taking these medications    Details   albuterol (PROVENTIL) 2.5 mg /3 mL (0.083 %) nebulizer solution Take 3 mLs (2.5 mg total) by nebulization every 8 (eight) hours while awake.  Qty: 1 Box, Refills: 3    Associated Diagnoses: COPD with exacerbation      levoFLOXacin (LEVAQUIN) 750 MG tablet Take 1 tablet (750 mg total) by mouth once daily.  Qty: 5 tablet, Refills: 0      predniSONE (DELTASONE) 20 MG tablet Take 1 tablet (20 mg total) by mouth As instructed. 3tab po daily x3days,2tabs po daily x3days,1tab po daily x3days,1/2tab po daily x3days  Qty: 30 tablet, Refills: 1    Associated Diagnoses: COPD with exacerbation         CONTINUE these medications which have NOT CHANGED    Details   escitalopram oxalate (LEXAPRO) 20 MG tablet Take 20 mg by mouth once daily.      metformin (GLUCOPHAGE) 500 MG tablet Take 500 mg by mouth every evening.      pantoprazole (PROTONIX) 40 MG tablet  Take 40 mg by mouth once daily.      simvastatin (ZOCOR) 10 MG tablet Take 10 mg by mouth once daily.      tamsulosin (FLOMAX) 0.4 mg Cp24 Take 0.4 mg by mouth once daily. evening           Time spent on the discharge of patient: 35 minutes    ANTONIO Erazo  Department of Hospital Medicine  [facility]

## 2017-05-07 NOTE — NURSING
Discharge instructions, procedure info, and medication info with follow up appointments and medicaton prescriptions reviewed with patient and daughter, neither had any questions.    Pt. Wheeled out by [employer] with all personal belongings to private vehicle. No acute distress noted.

## 2017-05-07 NOTE — PLAN OF CARE
Problem: Patient Care Overview  Goal: Plan of Care Review  Outcome: Ongoing (interventions implemented as appropriate)  Pt verbalized understanding of plan of care. Fall precautions maintained.  Bed locked and low.  Call light and personal items within reach. SR up x 2. O2 via NC to keep sats> than 92%. NPO; CBG monitoring q6. SSI admin as ordered.

## 2017-05-07 NOTE — PROGRESS NOTES
Progress Note  Pulmonology    Admit Date: 5/5/2017   LOS: 2 days     SUBJECTIVE:     History of Present Illness:  Patient is a 76 y.o. male presents with Cough, SOB wheezing  Known COPD seen By Dr Smart.  Needs prevnar 13  I have reviewed the patient's medical history in detail and updated the computerized patient record.  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[employer], stripper  Quit smoking > 20 years ago  Cardiology Dr Santiago: [facility] 10 years ago    Follow-up For:  Feels better  Still cough and wheezing    Continuous Infusions:   Scheduled Meds:   albuterol-ipratropium 2.5mg-0.5mg/3mL  3 mL Nebulization Q4H WAKE    arformoterol  15 mcg Nebulization BID    aspirin  325 mg Oral Daily    budesonide  0.5 mg Nebulization Q12H    enoxaparin  40 mg Subcutaneous Daily    escitalopram oxalate  20 mg Oral Daily    insulin detemir  10 Units Subcutaneous QHS    methylPREDNISolone sodium succinate  40 mg Intravenous Q8H    moxifloxacin  400 mg Intravenous Q24H    simvastatin  10 mg Oral Daily    tamsulosin  0.4 mg Oral Daily       Review of Systems:  Constitutional: no fever or chills  Respiratory: positive for cough and sputum  Cardiovascular: no chest pain or palpitations    OBJECTIVE:     Vital Signs Range (Last 24H):  Temp:  [97.8 °F (36.6 °C)-98.3 °F (36.8 °C)]   Pulse:  []   Resp:  [18-20]   BP: (107-130)/(45-72)   SpO2:  [89 %-96 %]     I & O (Last 24H):  Intake/Output Summary (Last 24 hours) at 05/07/17 0935  Last data filed at 05/06/17 2300   Gross per 24 hour   Intake              450 ml   Output                0 ml   Net              450 ml     Physical Exam:  General: no distress, mildly obese  Neck: no jugular venous distention  Lungs:  clear to auscultation bilaterally, normal respiratory effort, normal percussion bilaterally and scatter basal wheezes  Chest Wall: no tenderness  Heart: regular rate and  rhythm and no murmur  Abdomen: soft, non-tender non-distended; bowel sounds normal  Extremities: no cyanosis or edema, or clubbing  Neurologic: alert, oriented, thought content appropriate    Laboratory:  CBC:   Recent Labs  Lab 05/07/17  0631   WBC 12.53   RBC 4.67   HGB 14.1   HCT 41.3      MCV 88   MCH 30.2   MCHC 34.1     CMP:   Recent Labs  Lab 05/05/17  1136  05/07/17  0631   *  < > 196*   CALCIUM 8.8  < > 8.8   ALBUMIN 3.2*  --   --    PROT 7.4  --   --      < > 138   K 3.9  < > 4.7   CO2 22*  < > 22*     < > 108   BUN 12  < > 29*   CREATININE 1.0  < > 1.0   ALKPHOS 73  --   --    ALT 28  --   --    AST 24  --   --    BILITOT 1.5*  --   --    < > = values in this interval not displayed.      Home O2  Date Performed: 5/6/2017     1) Patient's Home O2 Sat on room air, while at rest: 87         If O2 sats on room air at rest are 88% or below, patient qualifies. No additional testing needed. Document N/A in steps 2 and 3. If 89% or above, complete steps 2.        2) Patient's O2 Sat on room air while exercising: n/a         If O2 sats on room air while exercising remain 89% or above patient does not qualify, no further testing needed Document N/A in step 3. If O2 sats on room air while exercising are 88% or below, continue to step 3.        3) Patient's O2 Sat while exercising on O2: at LPM         (Must show improvement from #2 for patients to qualify)     If O2 sats improve on oxygen, patient qualifies for portable oxygen. If not, the patient does not qualify.     ASSESSMENT/PLAN:       Problem   Copd With Exacerbation   Acute Respiratory Failure With Hypoxia       Plan:  Home oxygen ordered  Complete immunisations  Prednisone taper sent  Has Nebulizer, Albuterol to pharmacy  Complete abx  Follow in office  May DC Home    Thank you for the courtesy of participating in the care of this patient    Mir Joaquin MD

## 2017-05-08 ENCOUNTER — TELEPHONE (OUTPATIENT)
Dept: PULMONOLOGY | Facility: CLINIC | Age: 77
End: 2017-05-08

## 2017-05-08 DIAGNOSIS — J44.9 CHRONIC OBSTRUCTIVE PULMONARY DISEASE, UNSPECIFIED COPD TYPE: Primary | ICD-10-CM

## 2017-05-08 LAB
POCT GLUCOSE: 262 MG/DL (ref 70–110)
POCT GLUCOSE: 273 MG/DL (ref 70–110)
POCT GLUCOSE: 298 MG/DL (ref 70–110)

## 2017-05-08 NOTE — TELEPHONE ENCOUNTER
----- Message from Yoandy Morales LPN sent at 5/8/2017  2:26 PM CDT -----      ----- Message -----     From: Nakia Hayes     Sent: 5/8/2017   9:45 AM       To: Jemal Hester Staff    Call pt raisa Almaguer at [phone]///returning your call//janelle ht

## 2017-05-08 NOTE — TELEPHONE ENCOUNTER
Tried calling pt this morning to see if he was still in the hospital but no answer. Asked dr membreno if pt still in hospital since he seen pt yesterday. Dr. Membreno stated he would like for pt to be put on his schedule in 2-3 weeks with a amina and walk. Pt was set up with oxygen yesterday in hospital through [facility].

## 2017-05-10 LAB
BACTERIA BLD CULT: NORMAL
BACTERIA BLD CULT: NORMAL

## 2017-05-22 ENCOUNTER — TELEPHONE (OUTPATIENT)
Dept: PULMONOLOGY | Facility: CLINIC | Age: 77
End: 2017-05-22

## 2017-05-22 DIAGNOSIS — R06.02 SOB (SHORTNESS OF BREATH): Primary | ICD-10-CM

## 2017-05-23 NOTE — TELEPHONE ENCOUNTER
----- Message from Basia Lara sent at 5/23/2017  3:14 PM CDT -----  Contact: PT DAUGHTER WILLIS  PLEASE CALL PT DAUGHTER [phone] REGARDING PT APPT.

## 2017-05-24 ENCOUNTER — DOCUMENTATION ONLY (OUTPATIENT)
Dept: SLEEP MEDICINE | Facility: HOSPITAL | Age: 77
End: 2017-05-24

## 2017-05-25 ENCOUNTER — TELEPHONE (OUTPATIENT)
Dept: PULMONOLOGY | Facility: CLINIC | Age: 77
End: 2017-05-25

## 2017-05-25 NOTE — TELEPHONE ENCOUNTER
----- Message from Meagan Hope sent at 5/25/2017  9:13 AM CDT -----  Contact: Yoli/daughter  Yoli is requesting to speak to the nurse regarding pt's appts. Pt was told that his appts were scheduled for today but pt was scheduled yesterday, so pt missed the appts. Pls call Yoli back at [phone].

## 2017-05-29 ENCOUNTER — TELEPHONE (OUTPATIENT)
Dept: PULMONOLOGY | Facility: CLINIC | Age: 77
End: 2017-05-29

## 2017-05-30 ENCOUNTER — DOCUMENTATION ONLY (OUTPATIENT)
Dept: PULMONOLOGY | Facility: CLINIC | Age: 77
End: 2017-05-30

## 2017-07-11 ENCOUNTER — TELEPHONE (OUTPATIENT)
Dept: PULMONOLOGY | Facility: CLINIC | Age: 77
End: 2017-07-11

## 2017-07-11 NOTE — TELEPHONE ENCOUNTER
----- Message from Asha Jones sent at 7/11/2017  3:52 PM CDT -----  Contact: Pt/ Daughter Dorie  Pt needs to be seen as possible. Please give pt daughter a call at ..[phone] (home)

## 2017-08-08 ENCOUNTER — TELEPHONE (OUTPATIENT)
Dept: PULMONOLOGY | Facility: CLINIC | Age: 77
End: 2017-08-08

## 2017-08-08 DIAGNOSIS — J44.9 CHRONIC OBSTRUCTIVE PULMONARY DISEASE, UNSPECIFIED COPD TYPE: Primary | ICD-10-CM

## 2017-08-09 ENCOUNTER — DOCUMENTATION ONLY (OUTPATIENT)
Dept: PULMONOLOGY | Facility: CLINIC | Age: 77
End: 2017-08-09

## 2017-11-14 DIAGNOSIS — J44.9 CHRONIC OBSTRUCTIVE PULMONARY DISEASE, UNSPECIFIED COPD TYPE: Primary | ICD-10-CM

## 2017-11-15 RX ORDER — UMECLIDINIUM BROMIDE AND VILANTEROL TRIFENATATE 62.5; 25 UG/1; UG/1
POWDER RESPIRATORY (INHALATION)
Qty: 90 EACH | Refills: 5 | Status: SHIPPED | OUTPATIENT
Start: 2017-11-15 | End: 2018-07-25 | Stop reason: SDUPTHER

## 2018-01-03 ENCOUNTER — OFFICE VISIT (OUTPATIENT)
Dept: SLEEP MEDICINE | Facility: CLINIC | Age: 78
End: 2018-01-03
Payer: MEDICARE

## 2018-01-03 ENCOUNTER — HOSPITAL ENCOUNTER (OUTPATIENT)
Dept: RADIOLOGY | Facility: HOSPITAL | Age: 78
Discharge: HOME | End: 2018-01-03
Attending: INTERNAL MEDICINE
Payer: MEDICARE

## 2018-01-03 ENCOUNTER — TELEPHONE (OUTPATIENT)
Dept: PULMONOLOGY | Facility: CLINIC | Age: 78
End: 2018-01-03

## 2018-01-03 VITALS
WEIGHT: 198.63 LBS | TEMPERATURE: 99 F | HEIGHT: 72 IN | DIASTOLIC BLOOD PRESSURE: 76 MMHG | HEART RATE: 87 BPM | RESPIRATION RATE: 18 BRPM | SYSTOLIC BLOOD PRESSURE: 120 MMHG | OXYGEN SATURATION: 92 % | BODY MASS INDEX: 26.9 KG/M2

## 2018-01-03 DIAGNOSIS — J18.9 PNEUMONIA OF RIGHT LOWER LOBE DUE TO INFECTIOUS ORGANISM: Primary | ICD-10-CM

## 2018-01-03 DIAGNOSIS — J44.9 CHRONIC OBSTRUCTIVE PULMONARY DISEASE, UNSPECIFIED COPD TYPE: ICD-10-CM

## 2018-01-03 DIAGNOSIS — J44.1 COPD WITH EXACERBATION: ICD-10-CM

## 2018-01-03 PROCEDURE — 71046 X-RAY EXAM CHEST 2 VIEWS: CPT | Mod: TC,FY,PO

## 2018-01-03 PROCEDURE — 99999 PR PBB SHADOW E&M-EST. PATIENT-LVL IV: CPT | Mod: PBBFAC,,, | Performed by: NURSE PRACTITIONER

## 2018-01-03 PROCEDURE — 99214 OFFICE O/P EST MOD 30 MIN: CPT | Mod: S$GLB,,, | Performed by: NURSE PRACTITIONER

## 2018-01-03 PROCEDURE — 71046 X-RAY EXAM CHEST 2 VIEWS: CPT | Mod: 26,,, | Performed by: RADIOLOGY

## 2018-01-03 RX ORDER — PREDNISONE 20 MG/1
TABLET ORAL
Qty: 21 TABLET | Refills: 0 | Status: SHIPPED | OUTPATIENT
Start: 2018-01-03 | End: 2018-08-29 | Stop reason: SDUPTHER

## 2018-01-03 RX ORDER — ALBUTEROL SULFATE 90 UG/1
2 AEROSOL, METERED RESPIRATORY (INHALATION) EVERY 4 HOURS PRN
Qty: 18 G | Refills: 3 | Status: SHIPPED | OUTPATIENT
Start: 2018-01-03 | End: 2018-12-12 | Stop reason: SDUPTHER

## 2018-01-03 RX ORDER — FLUTICASONE PROPIONATE 50 MCG
1 SPRAY, SUSPENSION (ML) NASAL DAILY
COMMUNITY

## 2018-01-03 RX ORDER — LEVOFLOXACIN 750 MG/1
750 TABLET ORAL DAILY
Qty: 7 TABLET | Refills: 0 | Status: SHIPPED | OUTPATIENT
Start: 2018-01-03 | End: 2018-01-08

## 2018-01-03 RX ORDER — ALBUTEROL SULFATE 0.83 MG/ML
2.5 SOLUTION RESPIRATORY (INHALATION)
Qty: 2 BOX | Refills: 3 | Status: SHIPPED | OUTPATIENT
Start: 2018-01-03 | End: 2018-12-12 | Stop reason: SDUPTHER

## 2018-01-03 RX ORDER — LORATADINE 10 MG/1
10 TABLET ORAL DAILY
COMMUNITY

## 2018-01-03 RX ORDER — NAPROXEN SODIUM 220 MG/1
81 TABLET, FILM COATED ORAL DAILY
COMMUNITY

## 2018-01-03 NOTE — TELEPHONE ENCOUNTER
----- Message from Carlos Truong sent at 1/3/2018  9:59 AM CST -----  Contact: Gacwkgrh-[phone]  Would like same day appointment for breathing trouble.  Please call back at [phone].  Md Cullen

## 2018-01-03 NOTE — PROGRESS NOTES
Subjective:      Patient ID: Stone Reveles is a 77 y.o. male.    Chief Complaint: Shortness of Breath    HPI  Patient was COPD presents to the office today for shortness of breath.  Patient was admitted to the hospital May 2017 for COPD exacerbation and hypoxic respiratory failure.  He did not follow up on his scheduled appointments.   He is taking Anoro daily, oxygen and Albuterol.   He is not smoking.  He states he had a cold a few weeks ago and nausea and vomiting.  Yesterday he starting with increased SOB, congestion and cough. Clear mucous production.         /76   Pulse 87   Temp 99.1 °F (37.3 °C)   Resp 18   Ht 6' (1.829 m)   Wt 90.1 kg (198 lb 10.2 oz)   SpO2 (!) 92%   BMI 26.94 kg/m²   Body mass index is 26.94 kg/m².    Review of Systems   Constitutional: Negative.    Respiratory: Positive for cough, sputum production and shortness of breath.    Cardiovascular: Negative.    Musculoskeletal: Negative.    Gastrointestinal: Negative.    Neurological: Negative.    Psychiatric/Behavioral: Negative.      Objective:      Physical Exam   Constitutional: He is oriented to person, place, and time. He appears well-developed and well-nourished.   HENT:   Head: Normocephalic and atraumatic.   Nose: Nose normal.   Mouth/Throat: Uvula is midline and oropharynx is clear and moist.   Neck: Trachea normal and normal range of motion. Neck supple. No thyroid mass and no thyromegaly present.   Cardiovascular: Normal rate, regular rhythm and normal heart sounds.    Pulmonary/Chest: Effort normal and breath sounds normal. He has no wheezes. He has no rhonchi. He has no rales. Chest wall is not dull to percussion.   Abdominal: Soft. He exhibits no mass. There is no hepatosplenomegaly or splenomegaly. There is no tenderness.   Musculoskeletal: Normal range of motion. He exhibits no edema.   Neurological: He is alert and oriented to person, place, and time.   Skin: Skin is warm and dry.   Psychiatric: He has a normal  mood and affect.     Personal Diagnostic Review  CXR: infiltrate R        Assessment:       1. Pneumonia of right lower lobe due to infectious organism    2. COPD with exacerbation        Outpatient Encounter Prescriptions as of 1/3/2018   Medication Sig Dispense Refill    albuterol (PROVENTIL) 2.5 mg /3 mL (0.083 %) nebulizer solution Take 3 mLs (2.5 mg total) by nebulization every 8 (eight) hours while awake. 2 Box 3    ANORO ELLIPTA 62.5-25 mcg/actuation DsDv INHALE 1 PUFF EVERY DAY 90 each 5    aspirin 81 MG Chew Take 81 mg by mouth once daily.      escitalopram oxalate (LEXAPRO) 20 MG tablet Take 20 mg by mouth once daily.      fluticasone (FLONASE) 50 mcg/actuation nasal spray 1 spray by Each Nare route once daily.      loratadine (CLARITIN) 10 mg tablet Take 10 mg by mouth once daily.      metformin (GLUCOPHAGE) 500 MG tablet Take 500 mg by mouth every evening.      pantoprazole (PROTONIX) 40 MG tablet Take 40 mg by mouth once daily.      simvastatin (ZOCOR) 10 MG tablet Take 10 mg by mouth once daily.      tamsulosin (FLOMAX) 0.4 mg Cp24 Take 0.4 mg by mouth once daily. evening      [DISCONTINUED] albuterol (PROVENTIL) 2.5 mg /3 mL (0.083 %) nebulizer solution Take 3 mLs (2.5 mg total) by nebulization every 8 (eight) hours while awake. 1 Box 3    [DISCONTINUED] predniSONE (DELTASONE) 20 MG tablet Take 1 tablet (20 mg total) by mouth As instructed. 3tab po daily x3days,2tabs po daily x3days,1tab po daily x3days,1/2tab po daily x3days 30 tablet 1    albuterol (VENTOLIN HFA) 90 mcg/actuation inhaler Inhale 2 puffs into the lungs every 4 (four) hours as needed for Wheezing. 18 g 3    levoFLOXacin (LEVAQUIN) 750 MG tablet Take 1 tablet (750 mg total) by mouth once daily. 7 tablet 0    predniSONE (DELTASONE) 20 MG tablet 3 tablets for 3 days. 2 tablets for 3 days. 1 tablet for 3 days. One half tablet for 3 days. 21 tablet 0     No facility-administered encounter medications on file as of 1/3/2018.       Orders Placed This Encounter   Procedures    X-Ray Chest PA And Lateral     Standing Status:   Future     Standing Expiration Date:   1/3/2019     Order Specific Question:   May the Radiologist modify the order per protocol to meet the clinical needs of the patient?     Answer:   Yes     Plan:      No acute distress but will continue to monitor closely. ED if symptoms worsen or temp spike.   Levaquin, Mucinex, prednisone.   Follow up within a week with CXR.

## 2018-07-24 DIAGNOSIS — J44.9 CHRONIC OBSTRUCTIVE PULMONARY DISEASE, UNSPECIFIED COPD TYPE: ICD-10-CM

## 2018-07-24 RX ORDER — UMECLIDINIUM BROMIDE AND VILANTEROL TRIFENATATE 62.5; 25 UG/1; UG/1
POWDER RESPIRATORY (INHALATION)
Refills: 0 | OUTPATIENT
Start: 2018-07-24

## 2018-07-26 DIAGNOSIS — R06.02 SOB (SHORTNESS OF BREATH): Primary | ICD-10-CM

## 2018-08-29 RX ORDER — PREDNISONE 20 MG/1
TABLET ORAL
Qty: 21 TABLET | Refills: 0 | Status: SHIPPED | OUTPATIENT
Start: 2018-08-29 | End: 2020-03-12

## 2018-10-03 ENCOUNTER — TELEPHONE (OUTPATIENT)
Dept: PULMONOLOGY | Facility: CLINIC | Age: 78
End: 2018-10-03

## 2018-12-12 ENCOUNTER — OFFICE VISIT (OUTPATIENT)
Dept: PULMONOLOGY | Facility: CLINIC | Age: 78
End: 2018-12-12
Payer: MEDICARE

## 2018-12-12 ENCOUNTER — TELEPHONE (OUTPATIENT)
Dept: PULMONOLOGY | Facility: CLINIC | Age: 78
End: 2018-12-12

## 2018-12-12 ENCOUNTER — CLINICAL SUPPORT (OUTPATIENT)
Dept: PULMONOLOGY | Facility: CLINIC | Age: 78
End: 2018-12-12
Payer: MEDICARE

## 2018-12-12 VITALS
DIASTOLIC BLOOD PRESSURE: 60 MMHG | SYSTOLIC BLOOD PRESSURE: 100 MMHG | OXYGEN SATURATION: 93 % | BODY MASS INDEX: 29.54 KG/M2 | HEIGHT: 72 IN | HEART RATE: 99 BPM | RESPIRATION RATE: 19 BRPM | WEIGHT: 218.06 LBS

## 2018-12-12 DIAGNOSIS — J98.4 PULMONARY SCARRING: ICD-10-CM

## 2018-12-12 DIAGNOSIS — J44.9 COPD SUGGESTED BY INITIAL EVALUATION: ICD-10-CM

## 2018-12-12 DIAGNOSIS — J44.9 MODERATE COPD (CHRONIC OBSTRUCTIVE PULMONARY DISEASE): Primary | ICD-10-CM

## 2018-12-12 LAB
BRPFT: ABNORMAL
FEF 25 75 LLN: 0.86
FEF 25 75 PRE REF: 43.1 %
FEF 25 75 REF: 2.2
FEV1 FVC LLN: 60
FEV1 FVC PRE REF: 70.8 %
FEV1 FVC REF: 75
FEV1 LLN: 2.19
FEV1 PRE REF: 62.5 %
FEV1 REF: 3.13
FEV6 LLN: 3.22
FEV6 PRE REF: 83 %
FEV6 PRE: 3.47 L (ref 3.22–5.14)
FEV6 REF: 4.18
FVC LLN: 3.07
FVC PRE REF: 87.5 %
FVC REF: 4.24
MVV LLN: 106
MVV REF: 124
PEF LLN: 5.49
PEF PRE REF: 45.1 %
PEF REF: 7.94
PRE FEF 25 75: 0.95 L/S (ref 0.86–4.17)
PRE FET 100: 10.06 SEC
PRE FEV1 FVC: 52.81 % (ref 60.13–87.56)
PRE FEV1: 1.96 L (ref 2.19–4)
PRE FVC: 3.71 L (ref 3.07–5.42)
PRE PEF: 3.58 L/S (ref 5.49–10.39)

## 2018-12-12 PROCEDURE — 99999 PR PBB SHADOW E&M-EST. PATIENT-LVL IV: CPT | Mod: PBBFAC,,, | Performed by: INTERNAL MEDICINE

## 2018-12-12 PROCEDURE — 1101F PT FALLS ASSESS-DOCD LE1/YR: CPT | Mod: CPTII,S$GLB,, | Performed by: INTERNAL MEDICINE

## 2018-12-12 PROCEDURE — 99214 OFFICE O/P EST MOD 30 MIN: CPT | Mod: 25,S$GLB,, | Performed by: INTERNAL MEDICINE

## 2018-12-12 PROCEDURE — G0008 ADMIN INFLUENZA VIRUS VAC: HCPCS | Mod: S$GLB,,, | Performed by: INTERNAL MEDICINE

## 2018-12-12 PROCEDURE — 90662 IIV NO PRSV INCREASED AG IM: CPT | Mod: S$GLB,,, | Performed by: INTERNAL MEDICINE

## 2018-12-12 PROCEDURE — 94010 BREATHING CAPACITY TEST: CPT | Mod: S$GLB,,, | Performed by: INTERNAL MEDICINE

## 2018-12-12 RX ORDER — ALBUTEROL SULFATE 90 UG/1
2 AEROSOL, METERED RESPIRATORY (INHALATION) EVERY 4 HOURS PRN
Qty: 18 G | Refills: 3 | Status: SHIPPED | OUTPATIENT
Start: 2018-12-12 | End: 2023-05-31 | Stop reason: SDUPTHER

## 2018-12-12 RX ORDER — ALBUTEROL SULFATE 0.83 MG/ML
2.5 SOLUTION RESPIRATORY (INHALATION)
Qty: 2 BOX | Refills: 3 | Status: SHIPPED | OUTPATIENT
Start: 2018-12-12 | End: 2019-12-12

## 2018-12-12 NOTE — PROGRESS NOTES
Subjective:      Patient ID: Stone Reveles is a 78 y.o. male.    Chief Complaint: COPD    HPI   Mr Anushka Ritter is 78 years  Known COPD on ANORO, has been out of meds for couple of weeks  LV 01/2018  No baseline PFT in [facility] or care everywhere  Asks for meds refill  Chest CT 05/2017 compatible with severe emphysema   Has night time oxygen 2.5LPM  Requires flu shot  Seen before was at [facility]   COPD exacerbation and hypoxic respiratory failure.  He did not follow up on his scheduled appointments.   He is taking Anoro daily, oxygen and Albuterol.   He is not smoking.   mRC score 1.         /60   Pulse 99   Resp 19   Ht 6' (1.829 m)   Wt 98.9 kg (218 lb 0.6 oz)   SpO2 (!) 93% Comment: 2 liters  BMI 29.57 kg/m²   Body mass index is 29.57 kg/m².    Review of Systems   Constitutional: Negative.    Respiratory: Negative for cough, sputum production and shortness of breath.    Cardiovascular: Negative.    Musculoskeletal: Negative.    Gastrointestinal: Negative.    Neurological: Negative.    Psychiatric/Behavioral: Negative.      Objective:       Vitals:    12/12/18 1449   BP: 100/60   Pulse: 99   Resp: 19   SpO2: (!) 93%   Weight: 98.9 kg (218 lb 0.6 oz)   Height: 6' (1.829 m)       Physical Exam   Constitutional: He is oriented to person, place, and time. He appears well-developed and well-nourished.   HENT:   Head: Normocephalic and atraumatic.   Nose: Nose normal.   Mouth/Throat: Uvula is midline and oropharynx is clear and moist.   Neck: Trachea normal and normal range of motion. Neck supple. No thyroid mass and no thyromegaly present.   Cardiovascular: Normal rate, regular rhythm and normal heart sounds.   Pulmonary/Chest: Effort normal and breath sounds normal. He has no wheezes. He has no rhonchi. He has no rales. Chest wall is not dull to percussion.   Abdominal: Soft. He exhibits no mass. There is no hepatosplenomegaly or splenomegaly. There is no tenderness.   Musculoskeletal: Normal range of motion.  He exhibits no edema.   Neurological: He is alert and oriented to person, place, and time.   Skin: Skin is warm and dry.   Psychiatric: He has a normal mood and affect.     Personal Diagnostic Review  Prior Chest CT  1. Negative for acute pulmonary emboli. Negative for thoracic aortic aneurysm or dissection.    2. Severe chronic lung disease including rather severe emphysematous change bilaterally. There also changes of bronchiectasis and basilar scarring/fibrosis. There is superimposed posterior basilar atelectasis and or infiltrate of a mild degree, greater on the right.    Office Spirometry Results:      FEV1 1.96 L ( 62% predicted) FVC 3.71 ( 87% predicted ) FEV1 /FVC is 53   moderate obstructive defect.    Assessment:       1. Moderate COPD (chronic obstructive pulmonary disease)    2. Pulmonary scarring          Orders Placed This Encounter   Procedures    X-Ray Chest PA And Lateral     Standing Status:   Future     Standing Expiration Date:   12/12/2019    Influenza - High Dose (65+) (PF) (IM)    Alpha 1 Antitrypsin Phenotype     Standing Status:   Future     Standing Expiration Date:   2/10/2020    Alpha-1-antitrypsin     Standing Status:   Future     Standing Expiration Date:   2/10/2020    Spirometry without Bronchodilator     Standing Status:   Future     Number of Occurrences:   1     Standing Expiration Date:   12/12/2019     Plan:     Requested Prescriptions     Signed Prescriptions Disp Refills    umeclidinium-vilanterol (ANORO ELLIPTA) 62.5-25 mcg/actuation DsDv 90 each 5     Sig: Take 1 puff by mouth once daily. Controller    albuterol (PROVENTIL) 2.5 mg /3 mL (0.083 %) nebulizer solution 2 Box 3     Sig: Take 3 mLs (2.5 mg total) by nebulization every 8 (eight) hours while awake.    albuterol (VENTOLIN HFA) 90 mcg/actuation inhaler 18 g 3     Sig: Inhale 2 puffs into the lungs every 4 (four) hours as needed for Wheezing.     Follow-up in about 3 months (around 3/12/2019), or cxr, amina  today, flu shot, for 6MWD test, PFT, ABG on RA, CXR: PA and lat, Labs today.    This note was prepared using voice recognition system and is likely to have sound alike errors that may have been overlooked even after proof reading.  Please call me with any questions    Discussed diagnosis, its evaluation, treatment and usual course. All questions answered.    Thank you for the courtesy of participating in the care of this patient    Mir Joaquin MD

## 2018-12-12 NOTE — TELEPHONE ENCOUNTER
Received medication refill request for Prednisone, patient was seen in clinic on 01/18, contacted and advised him of need to schedule F/u appointment before medication could be filled ,patient stated understanding and he would have his daughter return call and  Schedule appointment,

## 2019-03-20 ENCOUNTER — TELEPHONE (OUTPATIENT)
Dept: PULMONOLOGY | Facility: CLINIC | Age: 79
End: 2019-03-20

## 2020-03-06 ENCOUNTER — TELEPHONE (OUTPATIENT)
Dept: PULMONOLOGY | Facility: CLINIC | Age: 80
End: 2020-03-06

## 2020-03-06 NOTE — TELEPHONE ENCOUNTER
----- Message from Steffi Agosto sent at 3/6/2020 10:36 AM CST -----  Contact: Daughter  Daughter called to get sooner appointment for pt and can be reached at [phone]        Thanks,  Steffi Agosto

## 2020-03-12 RX ORDER — PREDNISONE 20 MG/1
TABLET ORAL
Qty: 21 TABLET | Refills: 0 | Status: SHIPPED | OUTPATIENT
Start: 2020-03-12 | End: 2022-02-25

## 2020-04-28 ENCOUNTER — OFFICE VISIT (OUTPATIENT)
Dept: PULMONOLOGY | Facility: CLINIC | Age: 80
End: 2020-04-28
Payer: MEDICARE

## 2020-04-28 ENCOUNTER — TELEPHONE (OUTPATIENT)
Dept: PULMONOLOGY | Facility: CLINIC | Age: 80
End: 2020-04-28

## 2020-04-28 VITALS
DIASTOLIC BLOOD PRESSURE: 62 MMHG | OXYGEN SATURATION: 94 % | TEMPERATURE: 98 F | HEART RATE: 93 BPM | SYSTOLIC BLOOD PRESSURE: 110 MMHG | HEIGHT: 72 IN | BODY MASS INDEX: 31.15 KG/M2 | WEIGHT: 230 LBS

## 2020-04-28 DIAGNOSIS — J30.89 NON-SEASONAL ALLERGIC RHINITIS DUE TO OTHER ALLERGIC TRIGGER: ICD-10-CM

## 2020-04-28 DIAGNOSIS — J44.9 COPD, MODERATE: ICD-10-CM

## 2020-04-28 DIAGNOSIS — J44.1 COPD EXACERBATION: Primary | ICD-10-CM

## 2020-04-28 PROCEDURE — 1159F MED LIST DOCD IN RCRD: CPT | Mod: 95,,, | Performed by: NURSE PRACTITIONER

## 2020-04-28 PROCEDURE — 99213 OFFICE O/P EST LOW 20 MIN: CPT | Mod: 95,,, | Performed by: NURSE PRACTITIONER

## 2020-04-28 PROCEDURE — 1159F PR MEDICATION LIST DOCUMENTED IN MEDICAL RECORD: ICD-10-PCS | Mod: 95,,, | Performed by: NURSE PRACTITIONER

## 2020-04-28 PROCEDURE — 1126F PR PAIN SEVERITY QUANTIFIED, NO PAIN PRESENT: ICD-10-PCS | Mod: 95,,, | Performed by: NURSE PRACTITIONER

## 2020-04-28 PROCEDURE — 1126F AMNT PAIN NOTED NONE PRSNT: CPT | Mod: 95,,, | Performed by: NURSE PRACTITIONER

## 2020-04-28 PROCEDURE — 99213 PR OFFICE/OUTPT VISIT, EST, LEVL III, 20-29 MIN: ICD-10-PCS | Mod: 95,,, | Performed by: NURSE PRACTITIONER

## 2020-04-28 RX ORDER — IPRATROPIUM BROMIDE 42 UG/1
2 SPRAY, METERED NASAL 4 TIMES DAILY
Qty: 15 ML | Refills: 2 | Status: SHIPPED | OUTPATIENT
Start: 2020-04-28 | End: 2021-03-30

## 2020-04-28 RX ORDER — AZITHROMYCIN 250 MG/1
TABLET, FILM COATED ORAL
Qty: 6 TABLET | Refills: 0 | Status: SHIPPED | OUTPATIENT
Start: 2020-04-28

## 2020-04-28 RX ORDER — ALBUTEROL SULFATE 0.83 MG/ML
2.5 SOLUTION RESPIRATORY (INHALATION) EVERY 6 HOURS PRN
COMMUNITY
End: 2020-05-09

## 2020-04-28 NOTE — TELEPHONE ENCOUNTER
Spoke to the pts daughter pt has post nasal drip has been doing albuterol breathings treatments and anoro inhaler. Pt oxygen levels have been dropping prescribed oxygen to sleep with at night but has to use oxygen more frequently.

## 2020-04-28 NOTE — TELEPHONE ENCOUNTER
----- Message from Isi Gates sent at 4/28/2020  9:55 AM CDT -----  Contact: Dorie/daughter  Please call pt daughter @ [phone] regarding pt, states pt has sinus infection, states oxygen level low, and need to know what she should do.

## 2020-04-28 NOTE — PROGRESS NOTES
Telemedicine video visit related to 2020 Covid -19 social distancing recommendations  The patient location is:  Home  The chief complaint leading to consultation is: COPD exacerbation  Visit type: audiovisual  Total time spent with patient: 11:05 - 11:28 (20 minutes)   Each patient to whom he or she provides medical services by telemedicine is:  (1) informed of the relationship between the physician and patient and the respective role of any other health care provider with respect to management of the patient; and (2) notified that he or she may decline to receive medical services by telemedicine and may withdraw from such care at any time.     Patient ID: Stone Reveles is a 79 y.o. male.    Patient Active Problem List   Diagnosis    COPD suggested by initial evaluation    Acute respiratory failure with hypoxia    Hyperglycemia    Type 2 diabetes mellitus    Pulmonary scarring     Chief Complaint: COPD; Shortness of Breath; and Sinus Problem    HPI:  Stone Reveles is a 79 y.o. male telemedicine visit related to acute COPD flare status post developing URI symptoms over the past 5 days.  Patient reports he developed nasal congestion blowing his nose with thick mucus yellow and white over the past 5 days.  The mucus production of the sinuses has slowed down to as a great degree.  He called for same day appointment today related to felt he was slightly more short of breath over the past 4-5 days.  His O2 saturation this morning was 91% on room air.  He is currently wearing nasal cannula 2 L with O2 sat 94%.    He is on home oxygen 2 L cannula for sleep.  And he utilizes intermittently during the day if he has COPD flare or increased shortness of breath.    Current pulmonary regimen Anoro Ellipta once daily.  Has albuterol inhaler and nebulizer treatments on hand typically does not take additional treatments.  Over the past 3 days he is utilizing albuterol nebs twice daily with increased shortness of breath and  occasional cough.  No fever.  No chills.  No body aches.  Temp is 98.1.    Patient obtained a refill on prednisone 20 mg taper on 3/12/2020 prescribed by Liz LeJeune NP.   He reports he does not take high doses of prednisone that he will utilize prednisone 10 mg for approximately 5 days he does not like the way prednisone makes him feel it is height Sim up too much.  He has recently utilize prednisone 10 mg over the past 5 days.  At on treatment this visit Z-Blaine per patient preference and has worked well in the past.  Also added on Atrovent nasal spray    Previous Report Reviewed: lab reports, office notes and radiology reports     Past Medical History: The following portions of the patient's history were reviewed and updated as appropriate:   He  has no past surgical history on file.  His family history is not on file.  He  reports that he has quit smoking. His smokeless tobacco use includes snuff. He reports that he drinks alcohol. He reports that he does not use drugs.  He has a current medication list which includes the following prescription(s): albuterol, albuterol, aspirin, escitalopram oxalate, fluticasone propionate, loratadine, metformin, pantoprazole, simvastatin, tamsulosin, azithromycin, ipratropium, prednisone, and umeclidinium-vilanterol.  He has No Known Allergies..    Review of Systems   Constitutional: Negative for fever, chills, weight loss, weight gain, activity change, appetite change, fatigue and night sweats.   HENT: Negative for postnasal drip, rhinorrhea, sinus pressure, voice change and congestion.    Eyes: Negative for redness and itching.   Respiratory: Positive for cough, sputum production, shortness of breath, dyspnea on extertion and use of rescue inhaler. Negative for snoring, chest tightness, orthopnea, asthma nighttime symptoms and somnolence.    Cardiovascular: Negative.  Negative for chest pain, palpitations and leg swelling.   Genitourinary: Negative for difficulty urinating and  hematuria.   Endocrine: Negative for cold intolerance and heat intolerance.    Musculoskeletal: Negative for arthralgias, gait problem, joint swelling and myalgias.   Skin: Negative.    Gastrointestinal: Negative for nausea, vomiting, abdominal pain and acid reflux.   Neurological: Negative for dizziness, weakness, light-headedness and headaches.   Hematological: Negative for adenopathy. No excessive bruising.   All other systems reviewed and are negative.       Objective:   /62 (BP Location: Left arm)   Pulse 93   Temp 98.1 °F (36.7 °C) (Skin)   Ht 6' (1.829 m)   Wt 104.3 kg (230 lb)   SpO2 (!) 94% Comment: NC 2lm  BMI 31.19 kg/m²    Physical Exam   Constitutional: He appears well-developed and well-nourished. He is cooperative.  Non-toxic appearance. He does not have a sickly appearance. He does not appear ill. No distress.   Pulmonary/Chest: Effort normal.   Psychiatric: He has a normal mood and affect. His behavior is normal. Judgment and thought content normal.   Vitals reviewed.    Personal Diagnostic Review    12/12/2018 spirometry  Moderate obstruction.FEV1  62.62 % predicted. (FEV1/VC< LLN and FEV1 > or equal to 60% predicted and < or equal to 69%predicted) .    X-Ray Chest PA And Lateral  Narrative: Comparison: 05/05/2017, 11/05/2010    2 views     Findings:    Stable nodular density with adjacent pleural-parenchymal scarring RIGHT upper lung field.  Mild diffuse prominence interstitial markings within the mid and lower lung fields, greater on the RIGHT.  Minimal bibasilar scarring and/or subsegmental atelectasis.  Cannot completely exclude superimposed or developing airspace disease within the RIGHT base on this exam.  Correlate with clinical and laboratory findings.  Followup recommended to ensure complete clearance/return to baseline.  Trachea is midline.  No effusion.  Degenerative change T-spine with underlying osteopenia and accentuated kyphosis..  Impression:       1.  Equivocal  developing airspace disease RIGHT base superimposed on underlying chronic disease as above.     2.  Additional stable chronic findings as above.    Electronically signed by: BATSHEVA CORDERO III, MD  Date:     01/03/18  Time:    15:52     Assessment:     1. COPD exacerbation    2. Non-seasonal allergic rhinitis due to other allergic trigger    3. COPD, moderate      Orders Placed This Encounter   Procedures    X-Ray Chest PA And Lateral     Standing Status:   Future     Standing Expiration Date:   4/28/2021     Order Specific Question:   May the Radiologist modify the order per protocol to meet the clinical needs of the patient?     Answer:   Yes    Spirometry with/without bronchodilator     Standing Status:   Future     Standing Expiration Date:   4/28/2021     Plan:   Discussed diagnosis, its evaluation, treatment and usual course. All questions answered.  Problem List Items Addressed This Visit     None      Visit Diagnoses     COPD exacerbation    -  Primary    Relevant Medications    azithromycin (Z-CHARLENE) 250 MG tablet    Non-seasonal allergic rhinitis due to other allergic trigger        Relevant Medications    ipratropium (ATROVENT) 42 mcg (0.06 %) nasal spray    COPD, moderate        Relevant Orders    X-Ray Chest PA And Lateral    Spirometry with/without bronchodilator        Plan summary  Mild COPD exacerbation post acute URI was nasal congestion  Patient already has prednisone on hand advised prednisone 20 mg daily x5 days then prednisone 10 mg x5 days if tolerates.  (patient states he does not want additional prednisone called into his pharmacy he has plenty on hand).  Begin Atrovent nasal spray 2 sniffs 4 times daily x5 days then if improved may decrease to 2 sniffs twice daily x5 days then DC once nasal congestion with rhinorrhea is resolved.  Complete Z-Charlene    Total time spent in face to face counseling and coordination of care 25 in face to face  discussion concerning diagnosis, prognosis, review of  lab and test results, benefits of treatment as well as management of disease, counseling of patient and coordination of care between various health  care providers . Greater than half the time spent was used for coordination of care and counseling of patient. Discussion with other physicians or health care providers occurred.    Follow up in about 6 months (around 10/28/2020) for COPD w/review amina/cxr with DR. Joaquin .

## 2020-04-29 ENCOUNTER — TELEPHONE (OUTPATIENT)
Dept: PULMONOLOGY | Facility: CLINIC | Age: 80
End: 2020-04-29

## 2020-04-29 NOTE — TELEPHONE ENCOUNTER
Spoke with daughter Rosina. He finds he is a little more nervous with prednisone. Advised okay to reduce prednisone from 20 mg to back to prednisone 10mg daily or prednisone 10 mg every other day for dose of tolerance.   Okay to nerve medication on hand if needed.   No psychosis. Daughter states he normally a nervous person.

## 2020-04-29 NOTE — TELEPHONE ENCOUNTER
----- Message from Vivien Hester MA sent at 4/29/2020  4:44 PM CDT -----  Spoke with pt daughter . She stated that pt is on prednisone and albuterol and its got him shaking and bad nerves. She stated that his doctor has him on a mediation for his nerves and wants to know if it would be ok if took that to help with his nervousness. Please advise.   ----- Message -----  From: Lilian Cox  Sent: 4/29/2020   4:31 PM CDT  To: Jemal Hester Staff    Dorie-Daughter is calling regarding pt medication making him shaky// neverousness . Please call back at [phone]

## 2020-05-08 ENCOUNTER — PATIENT MESSAGE (OUTPATIENT)
Dept: PULMONOLOGY | Facility: CLINIC | Age: 80
End: 2020-05-08

## 2020-05-09 DIAGNOSIS — R06.02 SOB (SHORTNESS OF BREATH): Primary | ICD-10-CM

## 2020-05-09 RX ORDER — ALBUTEROL SULFATE 0.83 MG/ML
SOLUTION RESPIRATORY (INHALATION)
Qty: 90 ML | Refills: 11 | Status: SHIPPED | OUTPATIENT
Start: 2020-05-09 | End: 2020-06-05 | Stop reason: SDUPTHER

## 2020-10-07 DIAGNOSIS — J44.9 COPD, MODERATE: ICD-10-CM

## 2020-10-08 ENCOUNTER — PATIENT MESSAGE (OUTPATIENT)
Dept: PULMONOLOGY | Facility: CLINIC | Age: 80
End: 2020-10-08

## 2020-10-25 ENCOUNTER — LAB VISIT (OUTPATIENT)
Dept: URGENT CARE | Facility: CLINIC | Age: 80
End: 2020-10-25
Payer: MEDICARE

## 2020-10-25 DIAGNOSIS — J44.9 COPD, MODERATE: ICD-10-CM

## 2020-10-25 PROCEDURE — U0003 INFECTIOUS AGENT DETECTION BY NUCLEIC ACID (DNA OR RNA); SEVERE ACUTE RESPIRATORY SYNDROME CORONAVIRUS 2 (SARS-COV-2) (CORONAVIRUS DISEASE [COVID-19]), AMPLIFIED PROBE TECHNIQUE, MAKING USE OF HIGH THROUGHPUT TECHNOLOGIES AS DESCRIBED BY CMS-2020-01-R: HCPCS

## 2020-10-26 LAB — SARS-COV-2 RNA RESP QL NAA+PROBE: NOT DETECTED

## 2020-10-28 ENCOUNTER — PATIENT MESSAGE (OUTPATIENT)
Dept: PULMONOLOGY | Facility: CLINIC | Age: 80
End: 2020-10-28

## 2020-10-28 ENCOUNTER — OFFICE VISIT (OUTPATIENT)
Dept: PULMONOLOGY | Facility: CLINIC | Age: 80
End: 2020-10-28
Payer: MEDICARE

## 2020-10-28 ENCOUNTER — CLINICAL SUPPORT (OUTPATIENT)
Dept: PULMONOLOGY | Facility: CLINIC | Age: 80
End: 2020-10-28
Payer: MEDICARE

## 2020-10-28 ENCOUNTER — HOSPITAL ENCOUNTER (OUTPATIENT)
Dept: RADIOLOGY | Facility: HOSPITAL | Age: 80
Discharge: HOME | End: 2020-10-28
Attending: NURSE PRACTITIONER
Payer: MEDICARE

## 2020-10-28 VITALS
HEIGHT: 72 IN | RESPIRATION RATE: 18 BRPM | SYSTOLIC BLOOD PRESSURE: 122 MMHG | BODY MASS INDEX: 28.97 KG/M2 | WEIGHT: 213.88 LBS | DIASTOLIC BLOOD PRESSURE: 76 MMHG | OXYGEN SATURATION: 97 % | HEART RATE: 83 BPM

## 2020-10-28 DIAGNOSIS — J44.9 COPD, MODERATE: ICD-10-CM

## 2020-10-28 DIAGNOSIS — J44.9 COPD SUGGESTED BY INITIAL EVALUATION: Primary | ICD-10-CM

## 2020-10-28 DIAGNOSIS — E11.9 TYPE 2 DIABETES MELLITUS WITHOUT COMPLICATION, WITHOUT LONG-TERM CURRENT USE OF INSULIN: ICD-10-CM

## 2020-10-28 DIAGNOSIS — R06.02 SOB (SHORTNESS OF BREATH): ICD-10-CM

## 2020-10-28 DIAGNOSIS — E11.00 TYPE 2 DIABETES MELLITUS WITH HYPEROSMOLARITY WITHOUT COMA, WITHOUT LONG-TERM CURRENT USE OF INSULIN: ICD-10-CM

## 2020-10-28 DIAGNOSIS — E78.5 HYPERLIPIDEMIA ASSOCIATED WITH TYPE 2 DIABETES MELLITUS: ICD-10-CM

## 2020-10-28 DIAGNOSIS — J44.9 MODERATE COPD (CHRONIC OBSTRUCTIVE PULMONARY DISEASE): ICD-10-CM

## 2020-10-28 DIAGNOSIS — E11.69 HYPERLIPIDEMIA ASSOCIATED WITH TYPE 2 DIABETES MELLITUS: ICD-10-CM

## 2020-10-28 DIAGNOSIS — J96.11 CHRONIC RESPIRATORY FAILURE WITH HYPOXIA: ICD-10-CM

## 2020-10-28 DIAGNOSIS — J98.4 PULMONARY SCARRING: ICD-10-CM

## 2020-10-28 PROBLEM — K21.9 GASTROESOPHAGEAL REFLUX DISEASE: Status: ACTIVE | Noted: 2019-06-13

## 2020-10-28 PROBLEM — K57.90 DIVERTICULOSIS: Status: ACTIVE | Noted: 2019-06-13

## 2020-10-28 PROBLEM — F41.9 ANXIETY: Status: ACTIVE | Noted: 2019-09-09

## 2020-10-28 PROBLEM — J41.0 SIMPLE CHRONIC BRONCHITIS: Status: ACTIVE | Noted: 2019-06-13

## 2020-10-28 PROBLEM — N40.0 BENIGN PROSTATIC HYPERPLASIA WITHOUT LOWER URINARY TRACT SYMPTOMS: Status: ACTIVE | Noted: 2019-06-13

## 2020-10-28 PROBLEM — J43.9 EMPHYSEMA OF LUNG: Status: ACTIVE | Noted: 2020-10-28

## 2020-10-28 LAB
BRPFT: NORMAL
FEF 25 75 LLN: 0.81
FEF 25 75 PRE REF: 53.4 %
FEF 25 75 REF: 2.13
FEV1 FVC LLN: 60
FEV1 FVC PRE REF: 81.5 %
FEV1 FVC REF: 74
FEV1 LLN: 2.13
FEV1 PRE REF: 74.7 %
FEV1 REF: 3.07
FVC LLN: 3.01
FVC PRE REF: 90.8 %
FVC REF: 4.18
PEF LLN: 5.37
PEF PRE REF: 84 %
PEF REF: 7.83
PRE FEF 25 75: 1.14 L/S (ref 0.81–3.46)
PRE FET 100: 7.82 SEC
PRE FEV1 FVC: 60.55 % (ref 59.64–89)
PRE FEV1: 2.3 L (ref 2.13–4.01)
PRE FVC: 3.79 L (ref 3.01–5.34)
PRE PEF: 6.57 L/S (ref 5.37–10.28)

## 2020-10-28 PROCEDURE — 1100F PR PT FALLS ASSESS DOC 2+ FALLS/FALL W/INJURY/YR: ICD-10-PCS | Mod: CPTII,S$GLB,, | Performed by: INTERNAL MEDICINE

## 2020-10-28 PROCEDURE — 99214 OFFICE O/P EST MOD 30 MIN: CPT | Mod: 25,S$GLB,, | Performed by: INTERNAL MEDICINE

## 2020-10-28 PROCEDURE — 99999 PR PBB SHADOW E&M-EST. PATIENT-LVL V: ICD-10-PCS | Mod: PBBFAC,,, | Performed by: INTERNAL MEDICINE

## 2020-10-28 PROCEDURE — 1159F PR MEDICATION LIST DOCUMENTED IN MEDICAL RECORD: ICD-10-PCS | Mod: S$GLB,,, | Performed by: INTERNAL MEDICINE

## 2020-10-28 PROCEDURE — 3288F FALL RISK ASSESSMENT DOCD: CPT | Mod: CPTII,S$GLB,, | Performed by: INTERNAL MEDICINE

## 2020-10-28 PROCEDURE — 99999 PR PBB SHADOW E&M-EST. PATIENT-LVL V: CPT | Mod: PBBFAC,,, | Performed by: INTERNAL MEDICINE

## 2020-10-28 PROCEDURE — 94010 BREATHING CAPACITY TEST: CPT | Mod: S$GLB,,, | Performed by: INTERNAL MEDICINE

## 2020-10-28 PROCEDURE — 3288F PR FALLS RISK ASSESSMENT DOCUMENTED: ICD-10-PCS | Mod: CPTII,S$GLB,, | Performed by: INTERNAL MEDICINE

## 2020-10-28 PROCEDURE — 1159F MED LIST DOCD IN RCRD: CPT | Mod: S$GLB,,, | Performed by: INTERNAL MEDICINE

## 2020-10-28 PROCEDURE — 1100F PTFALLS ASSESS-DOCD GE2>/YR: CPT | Mod: CPTII,S$GLB,, | Performed by: INTERNAL MEDICINE

## 2020-10-28 PROCEDURE — 71046 X-RAY EXAM CHEST 2 VIEWS: CPT | Mod: TC

## 2020-10-28 PROCEDURE — 71046 X-RAY EXAM CHEST 2 VIEWS: CPT | Mod: 26,,, | Performed by: RADIOLOGY

## 2020-10-28 PROCEDURE — 99214 PR OFFICE/OUTPT VISIT, EST, LEVL IV, 30-39 MIN: ICD-10-PCS | Mod: 25,S$GLB,, | Performed by: INTERNAL MEDICINE

## 2020-10-28 PROCEDURE — 71046 XR CHEST PA AND LATERAL: ICD-10-PCS | Mod: 26,,, | Performed by: RADIOLOGY

## 2020-10-28 PROCEDURE — 94010 BREATHING CAPACITY TEST: ICD-10-PCS | Mod: S$GLB,,, | Performed by: INTERNAL MEDICINE

## 2020-10-28 RX ORDER — HYDROCODONE BITARTRATE AND ACETAMINOPHEN 5; 325 MG/1; MG/1
TABLET ORAL
COMMUNITY
Start: 2020-09-21

## 2020-10-28 RX ORDER — ALBUTEROL SULFATE 0.83 MG/ML
SOLUTION RESPIRATORY (INHALATION)
Qty: 90 ML | Refills: 11 | Status: SHIPPED | OUTPATIENT
Start: 2020-10-28 | End: 2022-02-25 | Stop reason: SDUPTHER

## 2020-10-28 RX ORDER — SULFAMETHOXAZOLE AND TRIMETHOPRIM 800; 160 MG/1; MG/1
TABLET ORAL
COMMUNITY
Start: 2020-09-21 | End: 2022-02-25

## 2020-10-28 RX ORDER — UMECLIDINIUM BROMIDE AND VILANTEROL TRIFENATATE 62.5; 25 UG/1; UG/1
POWDER RESPIRATORY (INHALATION)
Qty: 60 EACH | Refills: 11 | Status: SHIPPED | OUTPATIENT
Start: 2020-10-28 | End: 2021-11-09

## 2020-10-28 RX ORDER — TIZANIDINE 2 MG/1
TABLET ORAL
COMMUNITY
Start: 2020-09-25

## 2020-10-28 RX ORDER — MELOXICAM 15 MG/1
TABLET ORAL
COMMUNITY
Start: 2020-10-20

## 2020-10-28 RX ORDER — MUPIROCIN 20 MG/G
OINTMENT TOPICAL
COMMUNITY
Start: 2020-09-21

## 2020-10-28 NOTE — ASSESSMENT & PLAN NOTE
COPD ROS: taking medications as instructed, no medication side effects noted, no significant ongoing wheezing or shortness of breath, using bronchodilator MDI less than twice a week.   New concerns: None.   Meds: ANORO  Normal Spirometry  Exam: appears well, vitals normal, no respiratory distress, acyanotic, normal RR, chest clear to auscultation, no wheezes, rales or rhonchi, symmetric air entry.   Assessment:  COPD asymptomatic.   Plan: PROAIR, ANORO: Current treatment plan is effective, no change in therapy.   Influenza High-Dose (PF) Quad 0.7 mL 09/21/2020

## 2020-10-28 NOTE — PROGRESS NOTES
Subjective:      Patient ID: Stone Reveles is a 79 y.o. male.  Patient Active Problem List   Diagnosis    COPD suggested by initial evaluation    Chronic respiratory failure with hypoxia    Hyperglycemia    Type 2 diabetes mellitus    Pulmonary scarring    BMI 29.0-29.9,adult    Benign prostatic hyperplasia without lower urinary tract symptoms    Anxiety    Diverticulosis    Emphysema of lung    Gastroesophageal reflux disease    Encounter for diabetic foot exam    Hyperlipidemia associated with type 2 diabetes mellitus    Pulmonary nodule    Simple chronic bronchitis    Type 2 diabetes mellitus without complication, without long-term current use of insulin      Immunization History   Administered Date(s) Administered    Influenza - High Dose - PF (65 years and older) 12/12/2018    Pneumococcal Conjugate - 13 Valent 05/07/2017      Social History     Tobacco Use   Smoking Status Former Smoker   Smokeless Tobacco Current User    Types: Snuff      COPD Questionnaire  How often do you cough?: (P) A little of the time  How often do you have phlegm (mucus) in your chest?: (P) Almost never  How often does your chest feel tight?: (P) Almost never  When you walk up a hill or one flight of stairs, how often are you breathless?: (P) Most of the time  How often are you limited doing any activities at home?: (P) Never  How often are you confident leaving the house despite your lung condition?: (P) All of the time  How often do you sleep soundly?: (P) Never  How often do you have energy?: (P) A little of the time  Total score: (P) 16      Chief Complaint: COPD    HPI   Mr Anushka Ritter is 79 y.o.   Known COPD on ANORO, has been out of meds for couple of weeks  LV 04/28/2020  Here to review Gardner: NORMAL  CXR clear  Chest CT 05/2017 compatible with severe emphysema   Has night time oxygen 2.0LPM     COPD exacerbation and hypoxic respiratory failure: seen by Ms Alvarado 04/2020.     He is taking Anoro daily,  oxygen and Albuterol.   He is not smoking.   mRC score 1.         /76   Pulse 83   Resp 18   Ht 6' (1.829 m)   Wt 97 kg (213 lb 13.5 oz)   SpO2 97%   BMI 29.00 kg/m²   Body mass index is 29 kg/m².    Review of Systems   Constitutional: Negative.    Respiratory: Negative for cough, sputum production and shortness of breath.    Cardiovascular: Negative.    Musculoskeletal: Negative.    Gastrointestinal: Negative.    Neurological: Negative.    Psychiatric/Behavioral: Negative.      Objective:       Vitals:    10/28/20 1122   BP: 122/76   Pulse: 83   Resp: 18   SpO2: 97%   Weight: 97 kg (213 lb 13.5 oz)   Height: 6' (1.829 m)       Physical Exam  Constitutional:       Appearance: He is well-developed.   HENT:      Head: Normocephalic and atraumatic.      Nose: Nose normal.      Mouth/Throat:      Pharynx: Uvula midline.   Neck:      Musculoskeletal: Normal range of motion and neck supple.      Thyroid: No thyroid mass or thyromegaly.      Trachea: Trachea normal.   Cardiovascular:      Rate and Rhythm: Normal rate and regular rhythm.      Heart sounds: Normal heart sounds.   Pulmonary:      Effort: Pulmonary effort is normal.      Breath sounds: Normal breath sounds. No wheezing, rhonchi or rales.   Chest:      Chest wall: There is no dullness to percussion.   Abdominal:      Palpations: Abdomen is soft. There is no splenomegaly or mass.      Tenderness: There is no abdominal tenderness.   Musculoskeletal: Normal range of motion.   Skin:     General: Skin is warm and dry.   Neurological:      Mental Status: He is alert and oriented to person, place, and time.       Personal Diagnostic Review   CXR today      Office Spirometry Results:      FEV1 2.30 L ( 74.7% predicted) FVC 3.79L ( 90.8% predicted ) FEV1 /FVC is 61   moderate obstructive defect.    Assessment:       Problem List Items Addressed This Visit     Type 2 diabetes mellitus    Relevant Medications    SITagliptin (JANUVIA) 50 MG Tab    Pulmonary  scarring    COPD suggested by initial evaluation - Primary     COPD ROS: taking medications as instructed, no medication side effects noted, no significant ongoing wheezing or shortness of breath, using bronchodilator MDI less than twice a week.   New concerns: None.   Meds: ANORO  Normal Spirometry  Exam: appears well, vitals normal, no respiratory distress, acyanotic, normal RR, chest clear to auscultation, no wheezes, rales or rhonchi, symmetric air entry.   Assessment:  COPD asymptomatic.   Plan: PROAIR, ANORO: Current treatment plan is effective, no change in therapy.   Influenza High-Dose (PF) Quad 0.7 mL 09/21/2020              Relevant Medications    umeclidinium-vilanteroL (ANORO ELLIPTA) 62.5-25 mcg/actuation DsDv    Other Relevant Orders    X-Ray Chest PA And Lateral    Spirometry without Bronchodilator    Stress test, pulmonary    PULSE OXIMETRY OVERNIGHT    Chronic respiratory failure with hypoxia     POC 2.0 LPM         BMI 29.0-29.9,adult     General weight loss/lifestyle modification strategies discussed (elicit support from others; identify saboteurs; non-food rewards).  Diet interventions: low calorie (1000 kCal/d) deficit diet          Hyperlipidemia associated with type 2 diabetes mellitus     STABLE on ZOCOR         Relevant Medications    SITagliptin (JANUVIA) 50 MG Tab    Type 2 diabetes mellitus without complication, without long-term current use of insulin     STABLE ON METFORMIN, JANUVIA         Relevant Medications    SITagliptin (JANUVIA) 50 MG Tab      Other Visit Diagnoses     Moderate COPD (chronic obstructive pulmonary disease)        Relevant Medications    umeclidinium-vilanteroL (ANORO ELLIPTA) 62.5-25 mcg/actuation DsDv    SOB (shortness of breath)        Relevant Medications    albuterol (PROVENTIL) 2.5 mg /3 mL (0.083 %) nebulizer solution        Plan:     Requested Prescriptions     Signed Prescriptions Disp Refills    umeclidinium-vilanteroL (ANORO ELLIPTA) 62.5-25  mcg/actuation DsDv 60 each 11     Sig: INHALE 1 PUFF BY MOUTH INTO THE LUNGS ONCE DAILY.    albuterol (PROVENTIL) 2.5 mg /3 mL (0.083 %) nebulizer solution 90 mL 11     Sig: USE 1 VIAL BY NEBULIZATION Q8H WHILE AWAKE     Follow up in about 1 year (around 10/28/2021), or cxr, ABG, amina, onsat.    This note was prepared using voice recognition system and is likely to have sound alike errors that may have been overlooked even after proof reading.  Please call me with any questions    Discussed diagnosis, its evaluation, treatment and usual course. All questions answered.    Thank you for the courtesy of participating in the care of this patient    Mir Joaquin MD

## 2020-11-11 ENCOUNTER — PATIENT MESSAGE (OUTPATIENT)
Dept: PULMONOLOGY | Facility: CLINIC | Age: 80
End: 2020-11-11

## 2020-11-11 DIAGNOSIS — U07.1 COVID-19: ICD-10-CM

## 2020-11-11 DIAGNOSIS — Z20.822 CLOSE EXPOSURE TO COVID-19 VIRUS: Primary | ICD-10-CM

## 2020-11-11 NOTE — TELEPHONE ENCOUNTER
Please advise patient/daughter will need to determine covid -19 infection status.     No preventive medication if no covid. I defer any other recommendation to for medication such as hydroxychloroquine to Dr. Joaquin who last saw patient in clinic if covid + without symptoms.      If symptomatic he should report to ER of choice for through evaluation and rapid testing covid testing today.     If asymptomatic then proceed with orders placed for routine covid testing at community testing site of choice today or tomorrow.     Orders Placed This Encounter   Procedures    COVID-19 Routine Screening     Order Specific Question:   Is the patient symptomatic?     Answer:   No     Order Specific Question:   Is this needed for pre-procedure or pre-op testing?     Answer:   No     Order Specific Question:   Diagnosis:     Answer:   Encounter for observation for suspected exposure to other biological agents ruled out [[id number]]     1. Close exposure to COVID-19 virus  COVID-19 Routine Screening

## 2021-02-17 ENCOUNTER — TELEPHONE (OUTPATIENT)
Dept: PULMONOLOGY | Facility: CLINIC | Age: 81
End: 2021-02-17

## 2021-02-17 ENCOUNTER — PATIENT MESSAGE (OUTPATIENT)
Dept: PULMONOLOGY | Facility: CLINIC | Age: 81
End: 2021-02-17

## 2021-05-06 ENCOUNTER — PATIENT MESSAGE (OUTPATIENT)
Dept: RESEARCH | Facility: HOSPITAL | Age: 81
End: 2021-05-06

## 2022-02-24 DIAGNOSIS — J44.9 COPD SUGGESTED BY INITIAL EVALUATION: Primary | ICD-10-CM

## 2022-02-25 ENCOUNTER — OFFICE VISIT (OUTPATIENT)
Dept: PULMONOLOGY | Facility: CLINIC | Age: 82
End: 2022-02-25
Payer: MEDICARE

## 2022-02-25 ENCOUNTER — HOSPITAL ENCOUNTER (OUTPATIENT)
Dept: RADIOLOGY | Facility: HOSPITAL | Age: 82
Discharge: HOME | End: 2022-02-25
Attending: NURSE PRACTITIONER
Payer: MEDICARE

## 2022-02-25 VITALS
SYSTOLIC BLOOD PRESSURE: 123 MMHG | WEIGHT: 198.19 LBS | DIASTOLIC BLOOD PRESSURE: 70 MMHG | HEART RATE: 57 BPM | RESPIRATION RATE: 20 BRPM | HEIGHT: 72 IN | OXYGEN SATURATION: 98 % | BODY MASS INDEX: 26.84 KG/M2

## 2022-02-25 DIAGNOSIS — J44.9 COPD, MODERATE: ICD-10-CM

## 2022-02-25 DIAGNOSIS — R06.02 SOB (SHORTNESS OF BREATH): ICD-10-CM

## 2022-02-25 DIAGNOSIS — J44.9 COPD SUGGESTED BY INITIAL EVALUATION: ICD-10-CM

## 2022-02-25 DIAGNOSIS — J44.1 ACUTE EXACERBATION OF CHRONIC OBSTRUCTIVE PULMONARY DISEASE (COPD): Primary | ICD-10-CM

## 2022-02-25 DIAGNOSIS — J43.2 CENTRILOBULAR EMPHYSEMA: ICD-10-CM

## 2022-02-25 PROCEDURE — 3074F PR MOST RECENT SYSTOLIC BLOOD PRESSURE < 130 MM HG: ICD-10-PCS | Mod: CPTII,S$GLB,, | Performed by: NURSE PRACTITIONER

## 2022-02-25 PROCEDURE — 3288F PR FALLS RISK ASSESSMENT DOCUMENTED: ICD-10-PCS | Mod: CPTII,S$GLB,, | Performed by: NURSE PRACTITIONER

## 2022-02-25 PROCEDURE — 3288F FALL RISK ASSESSMENT DOCD: CPT | Mod: CPTII,S$GLB,, | Performed by: NURSE PRACTITIONER

## 2022-02-25 PROCEDURE — 99999 PR PBB SHADOW E&M-EST. PATIENT-LVL IV: ICD-10-PCS | Mod: PBBFAC,,, | Performed by: NURSE PRACTITIONER

## 2022-02-25 PROCEDURE — 99214 OFFICE O/P EST MOD 30 MIN: CPT | Mod: S$GLB,,, | Performed by: NURSE PRACTITIONER

## 2022-02-25 PROCEDURE — 99999 PR PBB SHADOW E&M-EST. PATIENT-LVL IV: CPT | Mod: PBBFAC,,, | Performed by: NURSE PRACTITIONER

## 2022-02-25 PROCEDURE — 1159F MED LIST DOCD IN RCRD: CPT | Mod: CPTII,S$GLB,, | Performed by: NURSE PRACTITIONER

## 2022-02-25 PROCEDURE — 71046 XR CHEST PA AND LATERAL: ICD-10-PCS | Mod: 26,,, | Performed by: RADIOLOGY

## 2022-02-25 PROCEDURE — 3074F SYST BP LT 130 MM HG: CPT | Mod: CPTII,S$GLB,, | Performed by: NURSE PRACTITIONER

## 2022-02-25 PROCEDURE — 1160F PR REVIEW ALL MEDS BY PRESCRIBER/CLIN PHARMACIST DOCUMENTED: ICD-10-PCS | Mod: CPTII,S$GLB,, | Performed by: NURSE PRACTITIONER

## 2022-02-25 PROCEDURE — 71046 X-RAY EXAM CHEST 2 VIEWS: CPT | Mod: 26,,, | Performed by: RADIOLOGY

## 2022-02-25 PROCEDURE — 1160F RVW MEDS BY RX/DR IN RCRD: CPT | Mod: CPTII,S$GLB,, | Performed by: NURSE PRACTITIONER

## 2022-02-25 PROCEDURE — 3078F DIAST BP <80 MM HG: CPT | Mod: CPTII,S$GLB,, | Performed by: NURSE PRACTITIONER

## 2022-02-25 PROCEDURE — 71046 X-RAY EXAM CHEST 2 VIEWS: CPT | Mod: TC

## 2022-02-25 PROCEDURE — 1101F PT FALLS ASSESS-DOCD LE1/YR: CPT | Mod: CPTII,S$GLB,, | Performed by: NURSE PRACTITIONER

## 2022-02-25 PROCEDURE — 99214 PR OFFICE/OUTPT VISIT, EST, LEVL IV, 30-39 MIN: ICD-10-PCS | Mod: S$GLB,,, | Performed by: NURSE PRACTITIONER

## 2022-02-25 PROCEDURE — 1101F PR PT FALLS ASSESS DOC 0-1 FALLS W/OUT INJ PAST YR: ICD-10-PCS | Mod: CPTII,S$GLB,, | Performed by: NURSE PRACTITIONER

## 2022-02-25 PROCEDURE — 1159F PR MEDICATION LIST DOCUMENTED IN MEDICAL RECORD: ICD-10-PCS | Mod: CPTII,S$GLB,, | Performed by: NURSE PRACTITIONER

## 2022-02-25 PROCEDURE — 3078F PR MOST RECENT DIASTOLIC BLOOD PRESSURE < 80 MM HG: ICD-10-PCS | Mod: CPTII,S$GLB,, | Performed by: NURSE PRACTITIONER

## 2022-02-25 RX ORDER — UMECLIDINIUM BROMIDE AND VILANTEROL TRIFENATATE 62.5; 25 UG/1; UG/1
POWDER RESPIRATORY (INHALATION)
Qty: 60 EACH | Refills: 11 | Status: CANCELLED | OUTPATIENT
Start: 2022-02-25

## 2022-02-25 RX ORDER — LOSARTAN POTASSIUM 50 MG/1
TABLET ORAL
COMMUNITY
Start: 2022-01-31

## 2022-02-25 RX ORDER — DOXYCYCLINE 100 MG/1
100 CAPSULE ORAL EVERY 12 HOURS
Qty: 20 CAPSULE | Refills: 0 | Status: SHIPPED | OUTPATIENT
Start: 2022-02-25 | End: 2022-03-07

## 2022-02-25 RX ORDER — ALBUTEROL SULFATE 0.83 MG/ML
2.5 SOLUTION RESPIRATORY (INHALATION) EVERY 4 HOURS PRN
Qty: 360 ML | Refills: 11 | Status: SHIPPED | OUTPATIENT
Start: 2022-02-25 | End: 2023-03-14 | Stop reason: SDUPTHER

## 2022-02-25 RX ORDER — METOPROLOL TARTRATE 25 MG/1
TABLET, FILM COATED ORAL
COMMUNITY
Start: 2022-01-31

## 2022-02-25 RX ORDER — EMPAGLIFLOZIN 10 MG/1
TABLET, FILM COATED ORAL
COMMUNITY
Start: 2022-02-15

## 2022-02-25 RX ORDER — PREDNISONE 20 MG/1
TABLET ORAL
Qty: 20 TABLET | Refills: 0 | Status: SHIPPED | OUTPATIENT
Start: 2022-02-25 | End: 2024-02-28 | Stop reason: SDUPTHER

## 2022-02-25 RX ORDER — SILDENAFIL 100 MG/1
TABLET, FILM COATED ORAL
COMMUNITY
Start: 2021-09-24

## 2022-02-25 RX ORDER — ATORVASTATIN CALCIUM 40 MG/1
TABLET, FILM COATED ORAL
COMMUNITY
Start: 2022-01-31

## 2022-02-25 NOTE — PROGRESS NOTES
Subjective:      Patient ID: Stone Reveles is a 81 y.o. male.  Patient Active Problem List   Diagnosis    Chronic respiratory failure with hypoxia    Hyperglycemia    Type 2 diabetes mellitus    Pulmonary scarring    BMI 29.0-29.9,adult    Benign prostatic hyperplasia without lower urinary tract symptoms    Anxiety    Diverticulosis    Emphysema of lung    Gastroesophageal reflux disease    Encounter for diabetic foot exam    Hyperlipidemia associated with type 2 diabetes mellitus    Pulmonary nodule    COPD, moderate    Type 2 diabetes mellitus without complication, without long-term current use of insulin      Immunization History   Administered Date(s) Administered    COVID-19, MRNA, LN-S, PF (MODERNA FULL 0.5 ML DOSE) 07/22/2021, 08/19/2021    Influenza - High Dose - PF (65 years and older) 12/12/2018    Pneumococcal Conjugate - 13 Valent 05/07/2017      Social History     Tobacco Use   Smoking Status Former Smoker    Types: Cigarettes   Smokeless Tobacco Current User    Types: Snuff      COPD Questionnaire  How often do you cough?: A little of the time  How often do you have phlegm (mucus) in your chest?: A little of the time  How often does your chest feel tight?: Some of the time  When you walk up a hill or one flight of stairs, how often are you breathless?: All of the time  How often are you limited doing any activities at home?: A little of the time  How often are you confident leaving the house despite your lung condition?: All of the time  How often do you sleep soundly?: Almost never  How often do you have energy?: A little of the time  Total score: 21      Chief Complaint: COPD and Shortness of Breath    HPI   Mr Anushka Ritter is 81 y.o. here for acute care visit. Accompanied by Tatyana dobbins.   Known COPD on ANORO, has been out of Anoror for 2 weeks  LV 10/28/2020 Dr Joaquin  Chest CT 05/2017 compatible with severe emphysema   Has night time oxygen 2.0LPM     Presents with  acute flare COPD exacerbation   Patient and Tatyana dobbins who is a Nurse reports she does not feel he was well controlled on Anoro, with break through wheezing, cough, chest congestion and shortness of breath exertional   He was taking Anoro daily up until out of Anoro x 2 weeks, did not obtain refill, too expensive, Anoro not on [employer] formulary  Adherent to NC 2 L sleep.  He wonders if can get portable concentrator. Will return for 6mwd after resumes COPD therapy and over acute flare, he has portable tanks and stationary compressor in use.    He is not smoking.   mRC score 2.     /70   Pulse (!) 57   Resp 20   Ht 6' (1.829 m)   Wt 89.9 kg (198 lb 3.1 oz)   SpO2 98% Comment: 2 liters  BMI 26.88 kg/m²   Body mass index is 26.88 kg/m².    Review of Systems   Constitutional: Negative for fever, weight loss, weight gain, activity change, appetite change and fatigue.   HENT: Negative.    Respiratory: Positive for cough, shortness of breath, wheezing, dyspnea on extertion and use of rescue inhaler. Negative for sputum production.    Cardiovascular: Negative.  Negative for chest pain, palpitations and leg swelling.   Genitourinary: Negative.    Endocrine: Negative for cold intolerance and heat intolerance.    Musculoskeletal: Negative.    Gastrointestinal: Negative.    Neurological: Negative.    Hematological: Negative for adenopathy.   Psychiatric/Behavioral: Negative.      Objective:       Vitals:    02/25/22 1123   BP: 123/70   Pulse: (!) 57   Resp: 20   SpO2: 98%   Weight: 89.9 kg (198 lb 3.1 oz)   Height: 6' (1.829 m)   Body mass index is 26.88 kg/m².      Physical Exam  Vitals and nursing note reviewed.   Constitutional:       General: He is not in acute distress.     Appearance: He is well-developed. He is not ill-appearing or toxic-appearing.   HENT:      Head: Normocephalic and atraumatic.      Right Ear: External ear normal.      Left Ear: External ear normal.      Nose: Nose normal.       Mouth/Throat:      Pharynx: Uvula midline. No oropharyngeal exudate.   Eyes:      Conjunctiva/sclera: Conjunctivae normal.   Neck:      Thyroid: No thyroid mass or thyromegaly.      Trachea: Trachea normal.   Cardiovascular:      Rate and Rhythm: Normal rate and regular rhythm.      Heart sounds: Normal heart sounds.   Pulmonary:      Effort: Pulmonary effort is normal.      Breath sounds: Normal breath sounds. No wheezing, rhonchi or rales.   Chest:      Chest wall: There is no dullness to percussion.   Abdominal:      Palpations: Abdomen is soft. There is no splenomegaly or mass.      Tenderness: There is no abdominal tenderness.   Musculoskeletal:         General: Normal range of motion.      Cervical back: Normal range of motion and neck supple.   Skin:     General: Skin is warm and dry.   Neurological:      Mental Status: He is alert and oriented to person, place, and time.   Psychiatric:         Behavior: Behavior normal. Behavior is cooperative.         Thought Content: Thought content normal.         Judgment: Judgment normal.       Personal Diagnostic Review   X-Ray Chest PA And Lateral  Narrative: EXAMINATION:  XR CHEST PA AND LATERAL    CLINICAL HISTORY:  Chronic obstructive pulmonary disease, unspecified    TECHNIQUE:  PA and lateral views of the chest were performed.    COMPARISON:  Prior radiographs    FINDINGS:  Cardiac silhouette and mediastinal contours are normal.  Lungs demonstrate COPD hyperinflation without acute opacity osseous structures are intact.  Impression: Stable chest    Electronically signed by: Cecilio Belcher MD  Date:    02/25/2022  Time:    11:50       Office Spirometry Results:      10/28/2020 Normal spirometry    12/12/2018 spirometry  moderate obstructive defect. FEV1 2.30 L ( 74.7% predicted) FVC 3.79L ( 90.8% predicted ) FEV1 /FVC is 61      Assessment:       1. Acute exacerbation of chronic obstructive pulmonary disease (COPD)  doxycycline (VIBRAMYCIN) 100 MG Cap     predniSONE (DELTASONE) 20 MG tablet   2. Centrilobular emphysema  albuterol (PROVENTIL) 2.5 mg /3 mL (0.083 %) nebulizer solution    doxycycline (VIBRAMYCIN) 100 MG Cap    predniSONE (DELTASONE) 20 MG tablet    Stress test, pulmonary    fluticasone-umeclidin-vilanter (TRELEGY ELLIPTA) 100-62.5-25 mcg DsDv   3. SOB (shortness of breath)  Stress test, pulmonary   4. COPD, moderate         Plan:     Problem List Items Addressed This Visit     Emphysema of lung     Seen on imaging           Relevant Medications    albuterol (PROVENTIL) 2.5 mg /3 mL (0.083 %) nebulizer solution    doxycycline (VIBRAMYCIN) 100 MG Cap    predniSONE (DELTASONE) 20 MG tablet    fluticasone-umeclidin-vilanter (TRELEGY ELLIPTA) 100-62.5-25 mcg DsDv    Other Relevant Orders    Stress test, pulmonary    COPD, moderate     Acute COPD  exacerbation  2/25/2022 stable chronic findings  Lungs clear to ausculation  Off controller Anoro x 2 weeks, not covered by insurance  Fiancee reports could have better control than when on anoro, still daily wheezing, cough.  Stop anoro  Begin Triple therapy Trelegy 100 mcg   Complete doxy/prednisone taper  Albuterol solution twice daily up to every 4 hours if needed.  Follow up 6 weeks w/6MWD                Other Visit Diagnoses     Acute exacerbation of chronic obstructive pulmonary disease (COPD)    -  Primary    Relevant Medications    doxycycline (VIBRAMYCIN) 100 MG Cap    predniSONE (DELTASONE) 20 MG tablet    SOB (shortness of breath)        Relevant Orders    Stress test, pulmonary          Requested Prescriptions     Signed Prescriptions Disp Refills    albuterol (PROVENTIL) 2.5 mg /3 mL (0.083 %) nebulizer solution 360 mL 11     Sig: Take 3 mLs (2.5 mg total) by nebulization every 4 (four) hours as needed for Wheezing or Shortness of Breath.    doxycycline (VIBRAMYCIN) 100 MG Cap 20 capsule 0     Sig: Take 1 capsule (100 mg total) by mouth every 12 (twelve) hours. for 10 days    predniSONE (DELTASONE)  20 MG tablet 20 tablet 0     Sig: 3 daily x 3 days, 2 daily x 3 days, 1 daily x 3 days, 1/2 daily x 4 days.    fluticasone-umeclidin-vilanter (TRELEGY ELLIPTA) 100-62.5-25 mcg DsDv 60 each 11     Sig: Inhale 1 puff into the lungs once daily.     I spent a total of 37 minutes on the day of the visit.  This includes face to face time and non-face to face time preparing to see the patient (eg, review of tests), obtaining and/or reviewing separately obtained history, documenting clinical information in the electronic or other health record, independently interpreting results and communicating results to the patient/family/caregiver, or care coordinator.    Follow up in about 6 weeks (around 4/8/2022) for COPD 6mwd , evaluate treatment response.    Thank you for the courtesy of participating in the care of this patient    Nessa Alvarado NP

## 2022-02-25 NOTE — ASSESSMENT & PLAN NOTE
Acute COPD  exacerbation  2/25/2022 stable chronic findings  Lungs clear to ausculation  Off controller Anoro x 2 weeks, not covered by insurance  Fiancee reports could have better control than when on anoro, still daily wheezing, cough.  Stop anoro  Begin Triple therapy Trelegy 100 mcg   Complete doxy/prednisone taper  Albuterol solution twice daily up to every 4 hours if needed.  Follow up 6 weeks w/6MWD

## 2022-04-19 ENCOUNTER — TELEPHONE (OUTPATIENT)
Dept: PULMONOLOGY | Facility: CLINIC | Age: 82
End: 2022-04-19
Payer: MEDICARE

## 2022-04-19 RX ORDER — TRAZODONE HYDROCHLORIDE 50 MG/1
TABLET ORAL
COMMUNITY
Start: 2022-01-14

## 2022-04-19 NOTE — TELEPHONE ENCOUNTER
Patient missed appointment today. Spoke with patient on phone and patient forgot about his appointments today and wants to reschedule. Will notify ARIANE Alvarado and staff.

## 2023-03-14 DIAGNOSIS — J44.9 COPD, MODERATE: ICD-10-CM

## 2023-03-14 DIAGNOSIS — J43.2 CENTRILOBULAR EMPHYSEMA: Primary | ICD-10-CM

## 2023-03-14 DIAGNOSIS — J96.11 CHRONIC RESPIRATORY FAILURE WITH HYPOXIA: ICD-10-CM

## 2023-03-14 RX ORDER — ALBUTEROL SULFATE 0.83 MG/ML
2.5 SOLUTION RESPIRATORY (INHALATION) EVERY 4 HOURS PRN
Qty: 360 ML | Refills: 11 | Status: SHIPPED | OUTPATIENT
Start: 2023-03-14 | End: 2023-05-15 | Stop reason: SDUPTHER

## 2023-03-14 NOTE — TELEPHONE ENCOUNTER
Please see the attached refill request. Spoke to patients wife informed her pt missed his follow up and will need a visit for refills. Pt made morgan for 05/15/2023.

## 2023-03-14 NOTE — TELEPHONE ENCOUNTER
Orders Placed This Encounter   Procedures    X-Ray Chest PA And Lateral     Standing Status:   Future     Standing Expiration Date:   3/14/2024     Order Specific Question:   May the Radiologist modify the order per protocol to meet the clinical needs of the patient?     Answer:   Yes     Order Specific Question:   Release to patient     Answer:   Immediate    Stress test, pulmonary     Standing Status:   Future     Standing Expiration Date:   3/14/2024     Order Specific Question:   Reason for study     Answer:   Functional status     Order Specific Question:   Release to patient     Answer:   Immediate    Spirometry with/without bronchodilator     Standing Status:   Future     Standing Expiration Date:   3/14/2024     Order Specific Question:   Release to patient     Answer:   Immediate     Requested Prescriptions     Signed Prescriptions Disp Refills    albuterol (PROVENTIL) 2.5 mg /3 mL (0.083 %) nebulizer solution 360 mL 11     Sig: Take 3 mLs (2.5 mg total) by nebulization every 4 (four) hours as needed for Wheezing or Shortness of Breath.     Authorizing Provider: CHRIS ALEJO    fluticasone-umeclidin-vilanter (TRELEGY ELLIPTA) 100-62.5-25 mcg DsDv 60 each 11     Sig: Inhale 1 puff into the lungs once daily.     Authorizing Provider: CHRIS ALEJO     1. Centrilobular emphysema  albuterol (PROVENTIL) 2.5 mg /3 mL (0.083 %) nebulizer solution    fluticasone-umeclidin-vilanter (TRELEGY ELLIPTA) 100-62.5-25 mcg DsDv    Stress test, pulmonary    Spirometry with/without bronchodilator    X-Ray Chest PA And Lateral      2. COPD, moderate  albuterol (PROVENTIL) 2.5 mg /3 mL (0.083 %) nebulizer solution    fluticasone-umeclidin-vilanter (TRELEGY ELLIPTA) 100-62.5-25 mcg DsDv    Stress test, pulmonary    Spirometry with/without bronchodilator    X-Ray Chest PA And Lateral      3. Chronic respiratory failure with hypoxia  Stress test, pulmonary

## 2023-05-15 DIAGNOSIS — J43.2 CENTRILOBULAR EMPHYSEMA: ICD-10-CM

## 2023-05-15 DIAGNOSIS — J44.9 COPD, MODERATE: ICD-10-CM

## 2023-05-15 RX ORDER — ALBUTEROL SULFATE 0.83 MG/ML
2.5 SOLUTION RESPIRATORY (INHALATION) EVERY 4 HOURS PRN
Qty: 360 ML | Refills: 11 | Status: SHIPPED | OUTPATIENT
Start: 2023-05-15

## 2023-05-31 ENCOUNTER — TELEPHONE (OUTPATIENT)
Dept: PULMONOLOGY | Facility: CLINIC | Age: 83
End: 2023-05-31
Payer: MEDICARE

## 2023-05-31 DIAGNOSIS — J44.9 MODERATE COPD (CHRONIC OBSTRUCTIVE PULMONARY DISEASE): ICD-10-CM

## 2023-05-31 RX ORDER — ALBUTEROL SULFATE 90 UG/1
2 AEROSOL, METERED RESPIRATORY (INHALATION) EVERY 4 HOURS PRN
Qty: 18 G | Refills: 11 | Status: SHIPPED | OUTPATIENT
Start: 2023-05-31

## 2023-05-31 NOTE — TELEPHONE ENCOUNTER
----- Message from Francoise Sibley, CRT sent at 5/31/2023  3:20 PM CDT -----  Patient missed appointments. Please reschedule amina and walk with provider appointment. Thank you.

## 2023-07-07 ENCOUNTER — PATIENT MESSAGE (OUTPATIENT)
Dept: INFECTIOUS DISEASES | Facility: CLINIC | Age: 83
End: 2023-07-07
Payer: MEDICARE

## 2023-07-26 ENCOUNTER — HOSPITAL ENCOUNTER (OUTPATIENT)
Dept: RADIOLOGY | Facility: HOSPITAL | Age: 83
Discharge: HOME | End: 2023-07-26
Attending: NURSE PRACTITIONER
Payer: MEDICARE

## 2023-07-26 ENCOUNTER — OFFICE VISIT (OUTPATIENT)
Dept: PULMONOLOGY | Facility: CLINIC | Age: 83
End: 2023-07-26
Payer: MEDICARE

## 2023-07-26 ENCOUNTER — CLINICAL SUPPORT (OUTPATIENT)
Dept: PULMONOLOGY | Facility: CLINIC | Age: 83
End: 2023-07-26
Payer: MEDICARE

## 2023-07-26 ENCOUNTER — PATIENT MESSAGE (OUTPATIENT)
Dept: PULMONOLOGY | Facility: CLINIC | Age: 83
End: 2023-07-26

## 2023-07-26 VITALS
HEART RATE: 62 BPM | OXYGEN SATURATION: 93 % | BODY MASS INDEX: 28.35 KG/M2 | RESPIRATION RATE: 15 BRPM | DIASTOLIC BLOOD PRESSURE: 72 MMHG | BODY MASS INDEX: 28.33 KG/M2 | HEIGHT: 71 IN | SYSTOLIC BLOOD PRESSURE: 123 MMHG | WEIGHT: 202.5 LBS | HEIGHT: 71 IN | WEIGHT: 202.38 LBS

## 2023-07-26 DIAGNOSIS — J44.9 COPD, MODERATE: ICD-10-CM

## 2023-07-26 DIAGNOSIS — J96.11 CHRONIC RESPIRATORY FAILURE WITH HYPOXIA: ICD-10-CM

## 2023-07-26 DIAGNOSIS — J44.9 COPD, MODERATE: Primary | ICD-10-CM

## 2023-07-26 DIAGNOSIS — E11.00 TYPE 2 DIABETES MELLITUS WITH HYPEROSMOLARITY WITHOUT COMA, WITHOUT LONG-TERM CURRENT USE OF INSULIN: ICD-10-CM

## 2023-07-26 DIAGNOSIS — J43.2 CENTRILOBULAR EMPHYSEMA: ICD-10-CM

## 2023-07-26 LAB
BRPFT: ABNORMAL
FEF 25 75 LLN: 0.72
FEF 25 75 PRE REF: 41 %
FEF 25 75 REF: 1.98
FEV1 FVC LLN: 59
FEV1 FVC PRE REF: 74.2 %
FEV1 FVC REF: 74
FEV1 LLN: 1.97
FEV1 PRE REF: 79 %
FEV1 REF: 2.88
FVC LLN: 2.81
FVC PRE REF: 105.3 %
FVC REF: 3.92
PEF LLN: 4.8
PEF PRE REF: 90.3 %
PEF REF: 7.18
PRE FEF 25 75: 0.81 L/S (ref 0.72–3.23)
PRE FET 100: 14.3 SEC
PRE FEV1 FVC: 55.04 % (ref 59.11–89.28)
PRE FEV1: 2.27 L (ref 1.97–3.78)
PRE FVC: 4.13 L (ref 2.81–5.03)
PRE PEF: 6.48 L/S (ref 4.8–9.55)

## 2023-07-26 PROCEDURE — 1101F PR PT FALLS ASSESS DOC 0-1 FALLS W/OUT INJ PAST YR: ICD-10-PCS | Mod: CPTII,S$GLB,, | Performed by: NURSE PRACTITIONER

## 2023-07-26 PROCEDURE — 94618 PULMONARY STRESS TESTING: CPT | Mod: S$GLB,,, | Performed by: INTERNAL MEDICINE

## 2023-07-26 PROCEDURE — 94010 BREATHING CAPACITY TEST: CPT | Mod: S$GLB,,, | Performed by: INTERNAL MEDICINE

## 2023-07-26 PROCEDURE — 3288F FALL RISK ASSESSMENT DOCD: CPT | Mod: CPTII,S$GLB,, | Performed by: NURSE PRACTITIONER

## 2023-07-26 PROCEDURE — 1101F PT FALLS ASSESS-DOCD LE1/YR: CPT | Mod: CPTII,S$GLB,, | Performed by: NURSE PRACTITIONER

## 2023-07-26 PROCEDURE — 1160F PR REVIEW ALL MEDS BY PRESCRIBER/CLIN PHARMACIST DOCUMENTED: ICD-10-PCS | Mod: CPTII,S$GLB,, | Performed by: NURSE PRACTITIONER

## 2023-07-26 PROCEDURE — 99214 PR OFFICE/OUTPT VISIT, EST, LEVL IV, 30-39 MIN: ICD-10-PCS | Mod: 25,S$GLB,, | Performed by: NURSE PRACTITIONER

## 2023-07-26 PROCEDURE — 71046 X-RAY EXAM CHEST 2 VIEWS: CPT | Mod: TC

## 2023-07-26 PROCEDURE — 99214 OFFICE O/P EST MOD 30 MIN: CPT | Mod: 25,S$GLB,, | Performed by: NURSE PRACTITIONER

## 2023-07-26 PROCEDURE — 94618 PULMONARY STRESS TESTING: ICD-10-PCS | Mod: S$GLB,,, | Performed by: INTERNAL MEDICINE

## 2023-07-26 PROCEDURE — 99999 PR PBB SHADOW E&M-EST. PATIENT-LVL V: CPT | Mod: PBBFAC,,, | Performed by: NURSE PRACTITIONER

## 2023-07-26 PROCEDURE — 3078F DIAST BP <80 MM HG: CPT | Mod: CPTII,S$GLB,, | Performed by: NURSE PRACTITIONER

## 2023-07-26 PROCEDURE — 3078F PR MOST RECENT DIASTOLIC BLOOD PRESSURE < 80 MM HG: ICD-10-PCS | Mod: CPTII,S$GLB,, | Performed by: NURSE PRACTITIONER

## 2023-07-26 PROCEDURE — 1159F PR MEDICATION LIST DOCUMENTED IN MEDICAL RECORD: ICD-10-PCS | Mod: CPTII,S$GLB,, | Performed by: NURSE PRACTITIONER

## 2023-07-26 PROCEDURE — 1159F MED LIST DOCD IN RCRD: CPT | Mod: CPTII,S$GLB,, | Performed by: NURSE PRACTITIONER

## 2023-07-26 PROCEDURE — 94010 BREATHING CAPACITY TEST: ICD-10-PCS | Mod: S$GLB,,, | Performed by: INTERNAL MEDICINE

## 2023-07-26 PROCEDURE — 3074F PR MOST RECENT SYSTOLIC BLOOD PRESSURE < 130 MM HG: ICD-10-PCS | Mod: CPTII,S$GLB,, | Performed by: NURSE PRACTITIONER

## 2023-07-26 PROCEDURE — 1160F RVW MEDS BY RX/DR IN RCRD: CPT | Mod: CPTII,S$GLB,, | Performed by: NURSE PRACTITIONER

## 2023-07-26 PROCEDURE — 71046 X-RAY EXAM CHEST 2 VIEWS: CPT | Mod: 26,,, | Performed by: RADIOLOGY

## 2023-07-26 PROCEDURE — 3074F SYST BP LT 130 MM HG: CPT | Mod: CPTII,S$GLB,, | Performed by: NURSE PRACTITIONER

## 2023-07-26 PROCEDURE — 71046 XR CHEST PA AND LATERAL: ICD-10-PCS | Mod: 26,,, | Performed by: RADIOLOGY

## 2023-07-26 PROCEDURE — 3288F PR FALLS RISK ASSESSMENT DOCUMENTED: ICD-10-PCS | Mod: CPTII,S$GLB,, | Performed by: NURSE PRACTITIONER

## 2023-07-26 PROCEDURE — 99999 PR PBB SHADOW E&M-EST. PATIENT-LVL V: ICD-10-PCS | Mod: PBBFAC,,, | Performed by: NURSE PRACTITIONER

## 2023-07-26 RX ORDER — FLUTICASONE FUROATE, UMECLIDINIUM BROMIDE AND VILANTEROL TRIFENATATE 200; 62.5; 25 UG/1; UG/1; UG/1
1 POWDER RESPIRATORY (INHALATION) DAILY
Qty: 180 EACH | Refills: 3 | Status: SHIPPED | OUTPATIENT
Start: 2023-07-26

## 2023-07-26 RX ORDER — TAMSULOSIN HYDROCHLORIDE 0.4 MG/1
1 CAPSULE ORAL NIGHTLY
COMMUNITY
Start: 2022-11-18

## 2023-07-26 RX ORDER — LOSARTAN POTASSIUM 50 MG/1
1 TABLET ORAL DAILY
COMMUNITY
Start: 2023-01-27

## 2023-07-26 RX ORDER — CYCLOSPORINE 0.5 MG/ML
EMULSION OPHTHALMIC
COMMUNITY
Start: 2023-04-06

## 2023-07-26 RX ORDER — TIZANIDINE 2 MG/1
1 TABLET ORAL 2 TIMES DAILY PRN
COMMUNITY
Start: 2022-10-13

## 2023-07-26 RX ORDER — PANTOPRAZOLE SODIUM 40 MG/1
1 TABLET, DELAYED RELEASE ORAL DAILY
COMMUNITY
Start: 2023-07-11

## 2023-07-26 RX ORDER — METOPROLOL TARTRATE 50 MG/1
50 TABLET ORAL 2 TIMES DAILY
COMMUNITY
Start: 2023-07-14

## 2023-07-26 RX ORDER — TRAZODONE HYDROCHLORIDE 50 MG/1
1 TABLET ORAL NIGHTLY
COMMUNITY
Start: 2023-05-18

## 2023-07-26 RX ORDER — METOPROLOL TARTRATE 25 MG/1
1 TABLET, FILM COATED ORAL 2 TIMES DAILY
COMMUNITY
Start: 2023-01-27

## 2023-07-26 RX ORDER — ESCITALOPRAM OXALATE 20 MG/1
1 TABLET ORAL EVERY MORNING
COMMUNITY
Start: 2023-05-30

## 2023-07-26 NOTE — TELEPHONE ENCOUNTER
Phone patient to review his questions since we had went over in detail while at clinic appointment today a comparison of spirometry results dating back to 2018.  With the study today showing mild obstruction.  Spirometry 2018 moderate obstruction FEV1 62.62%. 2020 FEV1 74.7%.  7/26/2023 today's spirometry FEV1 79%.  And as discussed at clinic appointment this afternoon,  that he was pleased with trilegy 100 compared to Anoro but he would like to know if he could have increased improvement over the Trelegy 100.  So change was made to begin trilogy 200. stop trelegy 100.  Patient recalls discussion in clinic and he is pleased to continue present plan of care as discussed in clinic today. Pt states understanding and satisfaction with questions answered.

## 2023-07-26 NOTE — PROCEDURES
"[facility] - Pulmonary Function  Six Minute Walk     SUMMARY     Ordering Provider: ARIANE Alvarado   Interpreting Provider: MD Jemal  Performing nurse/tech/RT: JULIO CESAR Sibley, CASS  Diagnosis:  (Centrilobular emphysema)  Height: 5' 11" (180.3 cm)  Weight: 91.8 kg (202 lb 7.9 oz)  BMI (Calculated): 28.3   Patient Race:             Phase Oxygen Assessment Supplemental O2 Heart   Rate Blood Pressure Jacky Dyspnea Scale Rating   Resting 95 % Room Air 61 bpm 118/59 0   Exercise        Minute        1 93 % Room Air 80 bpm     2 92 % Room Air 82 bpm     3 91 % Room Air 83 bpm     4 91 % Room Air 82 bpm     5 92 % Room Air 83 bpm     6  92 % Room Air 83 bpm 128/66 2   Recovery        Minute        1 91 % Room Air 76 bpm     2 95 % Room Air 73 bpm     3 95 % Room Air 64 bpm     4 95 % Room Air 64 bpm 119/61 0     Six Minute Walk Summary  6MWT Status: completed without stopping  Patient Reported: Dyspnea, Other (Comment) (hip pain)     Interpretation:  Did the patient stop or pause?: No            Total Time Walked (Calculated): 360 seconds  Final Partial Lap Distance (feet): 0 feet  Total Distance Meters (Calculated): 365.76 meters  Predicted Distance Meters (Calculated): 482.58 meters  Percentage of Predicted (Calculated): 75.79  Peak VO2 (Calculated): 14.95  Mets: 4.27  Has The Patient Had a Previous Six Minute Walk Test?: No       Previous 6MWT Results  Has The Patient Had a Previous Six Minute Walk Test?: No      Six minute walk distance is 365.76m /482.58 meters (75.79 % predicted) with very light.Patient did complete the study, walking 360 seconds of the 360 second test . During exercise, there was no  significant desaturation while breathing room air .Lowest oxygen saturation was 91% .Maximum heart rate during exercise was 83 bpm which is 60 % of maximum predicted heart rate of 138 bpm. Blood pressure remained stable and Heart rate remained stable Based upon age and body mass index, exercise capacity is normal.  Peak VO2 " during walking was 14.95 ml/kg/min which is 39 % of predicted Peak VO2 max of 38 ml/kg/min based on a resting heart rate of 61/min.    Patient has not had a previous study. No previous study performed.

## 2023-07-26 NOTE — ASSESSMENT & PLAN NOTE
>>ASSESSMENT AND PLAN FOR TYPE 2 DIABETES MELLITUS WRITTEN ON 7/26/2023  3:29 PM BY CHRIS ALEJO NP     Continue current treatment plan as previously prescribed with your PCP.   Hemoglobin A1C   Date Value Ref Range Status   05/05/2017 7.3 (H) 4.5 - 6.2 % Final     Comment:     According to ADA guidelines, hemoglobin A1C <7.0% represents  optimal control in non-pregnant diabetic patients.  Different  metrics may apply to specific populations.   Standards of Medical Care in Diabetes - 2016.  For the purpose of screening for the presence of diabetes:  <5.7%     Consistent with the absence of diabetes  5.7-6.4%  Consistent with increasing risk for diabetes   (prediabetes)  >or=6.5%  Consistent with diabetes  Currently no consensus exists for use of hemoglobin A1C  for diagnosis of diabetes for children.

## 2023-07-26 NOTE — PROGRESS NOTES
Subjective:      Patient ID: Stone Reveles is a 82 y.o. male.  Patient Active Problem List   Diagnosis    Chronic respiratory failure with hypoxia    Hyperglycemia    Type 2 diabetes mellitus    Pulmonary scarring    BMI 29.0-29.9,adult    Benign prostatic hyperplasia without lower urinary tract symptoms    Anxiety    Diverticulosis    Emphysema of lung    Gastroesophageal reflux disease    Encounter for diabetic foot exam    Hyperlipidemia associated with type 2 diabetes mellitus    Pulmonary nodule    COPD, moderate    Type 2 diabetes mellitus without complication, without long-term current use of insulin      Immunization History   Administered Date(s) Administered    COVID-19, MRNA, LN-S, PF (MODERNA FULL 0.5 ML DOSE) 2021, 2021    Influenza - High Dose - PF (65 years and older) 2018    Pneumococcal Conjugate - 13 Valent 2017      Social History     Tobacco Use   Smoking Status Former    Packs/day: 1.00    Years: 56.00    Pack years: 56.00    Types: Cigarettes    Start date:     Quit date:     Years since quittin.5   Smokeless Tobacco Current    Types: Snuff      COPD Questionnaire  How often do you cough?: Some of the time  How often do you have phlegm (mucus) in your chest?: Never  How often does your chest feel tight?: Never  When you walk up a hill or one flight of stairs, how often are you breathless?: A little of the time  How often are you limited doing any activities at home?: Never  How often are you confident leaving the house despite your lung condition?: All of the time  How often do you sleep soundly?: Most of the time  How often do you have energy?: Some of the time  Total score: 8      Chief Complaint: COPD and Pulmonary Nodules    HPI   Stone Reveles is here for follow up on COPD.   Seen prior by Dr. Joaquin and me.  Last seen by me 2023 for an acute visit.   He presents stable with compliant of chronic shortness of breath on exertion, if working  "on tractor and get off and goes to get back on tractor then shortness of breath with pulling himself up on tractor.   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    7/26/2023 6MWD No desaturations requiring supplemental oxygen at rest or exertion.     There is some cough and wheezing with change from Anoro to Trelegy 100 mcg. Trelegy 100 improved over Anoro  He is willing for trial of Trelegy 200 mcg to determine if can improve shortness of breath/cough/wheeze.     On NC 2 L sleep with benefit. No O2 indicated with activity on 6mwd.     Patient is willing to consider pulmonary rehab to improve over all conditioning. Referral made.    56 pack year former smoker. Quit 2013.     2/25/2023 Jenny NP   Mr Anushka Ritter is here for acute care visit. Accompanied by Tatyana dobbins.   Known COPD on ANORO, has been out of Anoror for 2 weeks  LV 10/28/2020 Dr Joaquin  Chest CT 05/2017 compatible with severe emphysema   Has night time oxygen 2.0LPM     Presents with acute flare COPD exacerbation   Patient and Tatyana dobbins who is a Nurse reports she does not feel he was well controlled on Anoro, with break through wheezing, cough, chest congestion and shortness of breath exertional   He was taking Anoro daily up until out of Anoro x 2 weeks, did not obtain refill, too expensive, Anoro not on [employer] formulary  Adherent to NC 2 L sleep.  He wonders if can get portable concentrator. Will return for 6mwd after resumes COPD therapy and over acute flare, he has portable tanks and stationary compressor in use.    He is not smoking.   mRC score 2.     /72   Pulse 62   Resp 15   Ht 5' 11" (1.803 m)   Wt 91.8 kg (202 lb 6.1 oz)   SpO2 (!) 93%   BMI 28.23 kg/m²   Body mass index is 28.23 kg/m².    Review of Systems   Constitutional:  Negative for weight loss, weight gain, activity change and appetite change.   HENT: Negative.     Respiratory:  Positive for " "cough, sputum production (white, occasional), shortness of breath, wheezing (occasional), dyspnea on extertion and use of rescue inhaler.    Cardiovascular: Negative.  Negative for palpitations and leg swelling.   Genitourinary: Negative.    Endocrine:  Negative for cold intolerance and heat intolerance.    Musculoskeletal: Negative.    Gastrointestinal: Negative.    Neurological: Negative.    Hematological:  Negative for adenopathy.   Psychiatric/Behavioral: Negative.     Objective:       Vitals:    07/26/23 1433   BP: 123/72   Pulse: 62   Resp: 15   SpO2: (!) 93%   Weight: 91.8 kg (202 lb 6.1 oz)   Height: 5' 11" (1.803 m)   Body mass index is 28.23 kg/m².      Physical Exam  Vitals and nursing note reviewed.   Constitutional:       General: He is not in acute distress.     Appearance: He is well-developed. He is not ill-appearing or toxic-appearing.   HENT:      Head: Normocephalic and atraumatic.      Right Ear: External ear normal.      Left Ear: External ear normal.      Nose: Nose normal.      Mouth/Throat:      Pharynx: Uvula midline. No oropharyngeal exudate.   Eyes:      Conjunctiva/sclera: Conjunctivae normal.   Neck:      Thyroid: No thyroid mass or thyromegaly.      Trachea: Trachea normal.   Cardiovascular:      Rate and Rhythm: Normal rate and regular rhythm.      Heart sounds: Normal heart sounds.   Pulmonary:      Effort: Pulmonary effort is normal.      Breath sounds: Normal breath sounds. No wheezing, rhonchi or rales.   Chest:      Chest wall: There is no dullness to percussion.   Abdominal:      Palpations: Abdomen is soft. There is no splenomegaly or mass.      Tenderness: There is no abdominal tenderness.   Musculoskeletal:         General: Normal range of motion.      Cervical back: Normal range of motion and neck supple.   Skin:     General: Skin is warm and dry.   Neurological:      Mental Status: He is alert and oriented to person, place, and time.   Psychiatric:         Behavior: Behavior " normal. Behavior is cooperative.         Thought Content: Thought content normal.         Judgment: Judgment normal.     Personal Diagnostic Review   X-Ray Chest PA And Lateral  Narrative: EXAM: XR CHEST PA AND LATERAL    CLINICAL HISTORY:   [J43.2]-Centrilobular emphysema./[J44.9]-Chronic obstructive pulmonary disease, unspecified.    COMPARISON:  02/25/2022.    FINDINGS:  2 view chest.  Mediastinal silhouette is within normal limits.  Flattening of the diaphragms which can be seen with COPD/emphysema.  Similar increased interstitial markings in both lung bases.  Probable bronchiectasis.  No pneumothorax or significant pleural effusion.  Impression: Chronic lung disease without acute finding.    Finalized on: 7/26/2023 1:47 PM By:  Leon Fung MD  [facility]# [id number]      2023-07-26 13:49:15.404    [facility]       Office Spirometry Results:      7/26/2023 spirometry moderate obstruction. FEV1 79.0%     10/28/2020 Normal spirometry.    12/12/2018 spirometry moderate obstruction.FEV1  62.62 % predicted    7/26/2023 6mwd No desaturations requiring supplemental oxygen at rest or exertion.  Phase Oxygen Assessment Supplemental O2 Heart   Rate Blood Pressure Jacky Dyspnea Scale Rating   Resting 95 % Room Air 61 bpm 118/59 0   Exercise             Minute             1 93 % Room Air 80 bpm       2 92 % Room Air 82 bpm       3 91 % Room Air 83 bpm       4 91 % Room Air 82 bpm       5 92 % Room Air 83 bpm       6  92 % Room Air 83 bpm 128/66 2   Recovery             Minute             1 91 % Room Air 76 bpm       2 95 % Room Air 73 bpm       3 95 % Room Air 64 bpm       4 95 % Room Air 64 bpm 119/61 0      Six Minute Walk Summary  6MWT Status: completed without stopping  Patient Reported: Dyspnea, Other (Comment) (hip pain)           Interpretation:  Did the patient stop or pause?: No  Total Time Walked (Calculated): 360 seconds  Final Partial Lap Distance (feet): 0 feet  Total Distance Meters (Calculated): 365.76 meters  Predicted  Distance Meters (Calculated): 482.58 meters  Percentage of Predicted (Calculated): 75.79  Peak VO2 (Calculated): 14.95  Mets: 4.27  Has The Patient Had a Previous Six Minute Walk Test?: No     Previous 6MWT Results  Has The Patient Had a Previous Six Minute Walk Test?: No  X-Ray Chest PA And Lateral  Narrative: EXAM: XR CHEST PA AND LATERAL    CLINICAL HISTORY:   [J43.2]-Centrilobular emphysema./[J44.9]-Chronic obstructive pulmonary disease, unspecified.    COMPARISON:  02/25/2022.    FINDINGS:  2 view chest.  Mediastinal silhouette is within normal limits.  Flattening of the diaphragms which can be seen with COPD/emphysema.  Similar increased interstitial markings in both lung bases.  Probable bronchiectasis.  No pneumothorax or significant pleural effusion.  Impression: Chronic lung disease without acute finding.    Finalized on: 7/26/2023 1:47 PM By:  Leon Fung MD  [facility]# [id number]      2023-07-26 13:49:15.404    [facility]        Assessment:       1. COPD, moderate  fluticasone-umeclidin-vilanter (TRELEGY ELLIPTA) 200-62.5-25 mcg inhaler    Ambulatory referral/consult to Pulmonary Rehab      2. Centrilobular emphysema        3. Type 2 diabetes mellitus with hyperosmolarity without coma, without long-term current use of insulin              Plan:     Problem List Items Addressed This Visit       Type 2 diabetes mellitus      Continue current treatment plan as previously prescribed with your PCP.   Hemoglobin A1C   Date Value Ref Range Status   05/05/2017 7.3 (H) 4.5 - 6.2 % Final     Comment:     According to ADA guidelines, hemoglobin A1C <7.0% represents  optimal control in non-pregnant diabetic patients.  Different  metrics may apply to specific populations.   Standards of Medical Care in Diabetes - 2016.  For the purpose of screening for the presence of diabetes:  <5.7%     Consistent with the absence of diabetes  5.7-6.4%  Consistent with increasing risk for diabetes   (prediabetes)  >or=6.5%  Consistent with  diabetes  Currently no consensus exists for use of hemoglobin A1C  for diagnosis of diabetes for children.              Emphysema of lung     See moderate COPD          COPD, moderate - Primary     Sub optimal control with change from anoro to trelegy 100, some cough, wheeze, shortness of breath.  Stop trelegy 100. Begin Trelegy 200. Continue Albuterol inhaler and nebs if needed.   Consideration to add on Daliresp if not improved with Trelegy 200   Referral pul rehab   Follow up in 2 months evaluate treatment response.          Relevant Medications    fluticasone-umeclidin-vilanter (TRELEGY ELLIPTA) 200-62.5-25 mcg inhaler    Other Relevant Orders    Ambulatory referral/consult to Pulmonary Rehab       Requested Prescriptions     Signed Prescriptions Disp Refills    fluticasone-umeclidin-vilanter (TRELEGY ELLIPTA) 200-62.5-25 mcg inhaler 180 each 3     Sig: Inhale 1 puff into the lungs once daily.     I spent a total of 36 minutes on the day of the visit.  This includes face to face time and non-face to face time preparing to see the patient (eg, review of tests), obtaining and/or reviewing separately obtained history, documenting clinical information in the electronic or other health record, independently interpreting results and communicating results to the patient/family/caregiver, or care coordinator.    Follow up in about 8 weeks (around 9/20/2023) for COPD evaluate treatment response.    Thank you for the courtesy of participating in the care of this patient    Nessa Alvarado NP

## 2023-07-26 NOTE — ASSESSMENT & PLAN NOTE
Sub optimal control with change from anoro to trelegy 100, some cough, wheeze, shortness of breath.  Stop trelegy 100. Begin Trelegy 200. Continue Albuterol inhaler and nebs if needed.   Consideration to add on Daliresp if not improved with Trelegy 200   Referral pul rehab   Follow up in 2 months evaluate treatment response.

## 2023-07-26 NOTE — ASSESSMENT & PLAN NOTE
Continue current treatment plan as previously prescribed with your PCP.   Hemoglobin A1C   Date Value Ref Range Status   05/05/2017 7.3 (H) 4.5 - 6.2 % Final     Comment:     According to ADA guidelines, hemoglobin A1C <7.0% represents  optimal control in non-pregnant diabetic patients.  Different  metrics may apply to specific populations.   Standards of Medical Care in Diabetes - 2016.  For the purpose of screening for the presence of diabetes:  <5.7%     Consistent with the absence of diabetes  5.7-6.4%  Consistent with increasing risk for diabetes   (prediabetes)  >or=6.5%  Consistent with diabetes  Currently no consensus exists for use of hemoglobin A1C  for diagnosis of diabetes for children.

## 2023-10-20 ENCOUNTER — TELEPHONE (OUTPATIENT)
Dept: PULMONOLOGY | Facility: CLINIC | Age: 83
End: 2023-10-20
Payer: MEDICARE

## 2023-10-25 ENCOUNTER — TELEPHONE (OUTPATIENT)
Dept: PULMONOLOGY | Facility: HOSPITAL | Age: 83
End: 2023-10-25
Payer: MEDICARE

## 2023-10-25 NOTE — TELEPHONE ENCOUNTER
Called pt regarding Pulmonary Rehab referral. SWP and provided pt with a [place] wait list update. Pt verbalized understanding. Pt would like to wait until after the start of the new year.

## 2023-12-06 RX ORDER — METFORMIN HYDROCHLORIDE 500 MG/1
500 TABLET, EXTENDED RELEASE ORAL 2 TIMES DAILY WITH MEALS
Qty: 180 TABLET | Refills: 0 | OUTPATIENT
Start: 2023-12-06

## 2023-12-06 NOTE — TELEPHONE ENCOUNTER
Refill Routing Note   Medication(s) are not appropriate for processing by [facility] for the following reason(s):        Responsible provider unclear    [facility] action(s):  Defer               Appointments  past 12m or future 3m with PCP    Date Provider   Last Visit   Visit date not found Jacinto David MD   Next Visit   Visit date not found Jacinto David MD   ED visits in past 90 days: 0        Note composed:7:49 PM 12/05/2023

## 2023-12-18 ENCOUNTER — TELEPHONE (OUTPATIENT)
Dept: PULMONOLOGY | Facility: HOSPITAL | Age: 83
End: 2023-12-18
Payer: MEDICARE

## 2023-12-18 NOTE — TELEPHONE ENCOUNTER
Called pt regarding Pulmonary Rehab. SWP. Pt is not ready to schedule. Pt requested to call back or to receive a call back in mid January.

## 2024-01-24 ENCOUNTER — TELEPHONE (OUTPATIENT)
Dept: PULMONOLOGY | Facility: HOSPITAL | Age: 84
End: 2024-01-24
Payer: MEDICARE

## 2024-01-24 NOTE — TELEPHONE ENCOUNTER
Called pt to follow up regarding Pulmonary Rehab referral. Several unsuccessful attempts have been made to enrol pt into the pulmonary Rehab program. SWP again today. Pt refused Pulmonary Rehab services at this time. Pt's referral will closed and removed from the work queue.

## 2024-02-09 DIAGNOSIS — J30.89 NON-SEASONAL ALLERGIC RHINITIS DUE TO OTHER ALLERGIC TRIGGER: ICD-10-CM

## 2024-02-09 DIAGNOSIS — J43.2 CENTRILOBULAR EMPHYSEMA: ICD-10-CM

## 2024-02-09 DIAGNOSIS — J44.1 ACUTE EXACERBATION OF CHRONIC OBSTRUCTIVE PULMONARY DISEASE (COPD): ICD-10-CM

## 2024-02-09 RX ORDER — PREDNISONE 20 MG/1
TABLET ORAL
Qty: 20 TABLET | Refills: 0 | Status: CANCELLED | OUTPATIENT
Start: 2024-02-09

## 2024-02-14 RX ORDER — IPRATROPIUM BROMIDE 42 UG/1
2 SPRAY, METERED NASAL 2 TIMES DAILY
Qty: 15 ML | Refills: 11 | Status: SHIPPED | OUTPATIENT
Start: 2024-02-14

## 2024-02-28 ENCOUNTER — CLINICAL SUPPORT (OUTPATIENT)
Dept: PULMONOLOGY | Facility: CLINIC | Age: 84
End: 2024-02-28
Payer: MEDICARE

## 2024-02-28 ENCOUNTER — OFFICE VISIT (OUTPATIENT)
Dept: PULMONOLOGY | Facility: CLINIC | Age: 84
End: 2024-02-28
Payer: MEDICARE

## 2024-02-28 ENCOUNTER — TELEPHONE (OUTPATIENT)
Dept: PULMONOLOGY | Facility: CLINIC | Age: 84
End: 2024-02-28
Payer: MEDICARE

## 2024-02-28 ENCOUNTER — HOSPITAL ENCOUNTER (OUTPATIENT)
Dept: RADIOLOGY | Facility: HOSPITAL | Age: 84
Discharge: HOME | End: 2024-02-28
Attending: NURSE PRACTITIONER
Payer: MEDICARE

## 2024-02-28 VITALS
DIASTOLIC BLOOD PRESSURE: 62 MMHG | WEIGHT: 205 LBS | RESPIRATION RATE: 20 BRPM | HEART RATE: 42 BPM | SYSTOLIC BLOOD PRESSURE: 120 MMHG | BODY MASS INDEX: 28.7 KG/M2 | OXYGEN SATURATION: 98 % | HEIGHT: 71 IN

## 2024-02-28 VITALS — HEIGHT: 71 IN | BODY MASS INDEX: 28.7 KG/M2 | WEIGHT: 205 LBS

## 2024-02-28 DIAGNOSIS — J44.9 COPD, MODERATE: ICD-10-CM

## 2024-02-28 DIAGNOSIS — E11.9 TYPE 2 DIABETES MELLITUS WITHOUT COMPLICATION, WITHOUT LONG-TERM CURRENT USE OF INSULIN: ICD-10-CM

## 2024-02-28 DIAGNOSIS — J96.11 CHRONIC RESPIRATORY FAILURE WITH HYPOXIA: ICD-10-CM

## 2024-02-28 DIAGNOSIS — J44.1 COPD WITH ACUTE EXACERBATION: Primary | ICD-10-CM

## 2024-02-28 DIAGNOSIS — J84.9 INTERSTITIAL PULMONARY DISEASE, UNSPECIFIED: ICD-10-CM

## 2024-02-28 DIAGNOSIS — J84.10 CALCIFIED GRANULOMA OF LUNG: ICD-10-CM

## 2024-02-28 DIAGNOSIS — J44.1 COPD WITH ACUTE EXACERBATION: ICD-10-CM

## 2024-02-28 DIAGNOSIS — J43.2 CENTRILOBULAR EMPHYSEMA: ICD-10-CM

## 2024-02-28 PROCEDURE — 99999 PR PBB SHADOW E&M-EST. PATIENT-LVL I: CPT | Mod: PBBFAC,,,

## 2024-02-28 PROCEDURE — 3288F FALL RISK ASSESSMENT DOCD: CPT | Mod: CPTII,S$GLB,, | Performed by: NURSE PRACTITIONER

## 2024-02-28 PROCEDURE — 71046 X-RAY EXAM CHEST 2 VIEWS: CPT | Mod: TC

## 2024-02-28 PROCEDURE — 71046 X-RAY EXAM CHEST 2 VIEWS: CPT | Mod: 26,,, | Performed by: RADIOLOGY

## 2024-02-28 PROCEDURE — 99215 OFFICE O/P EST HI 40 MIN: CPT | Mod: 25,S$GLB,, | Performed by: NURSE PRACTITIONER

## 2024-02-28 PROCEDURE — 1101F PT FALLS ASSESS-DOCD LE1/YR: CPT | Mod: CPTII,S$GLB,, | Performed by: NURSE PRACTITIONER

## 2024-02-28 PROCEDURE — 3078F DIAST BP <80 MM HG: CPT | Mod: CPTII,S$GLB,, | Performed by: NURSE PRACTITIONER

## 2024-02-28 PROCEDURE — 94618 PULMONARY STRESS TESTING: CPT | Mod: S$GLB,,, | Performed by: INTERNAL MEDICINE

## 2024-02-28 PROCEDURE — 3074F SYST BP LT 130 MM HG: CPT | Mod: CPTII,S$GLB,, | Performed by: NURSE PRACTITIONER

## 2024-02-28 PROCEDURE — 1126F AMNT PAIN NOTED NONE PRSNT: CPT | Mod: CPTII,S$GLB,, | Performed by: NURSE PRACTITIONER

## 2024-02-28 PROCEDURE — 99999 PR PBB SHADOW E&M-EST. PATIENT-LVL V: CPT | Mod: PBBFAC,,, | Performed by: NURSE PRACTITIONER

## 2024-02-28 RX ORDER — CHLORPHENIRAMINE MALEATE 4 MG
4 TABLET ORAL EVERY 4 HOURS PRN
COMMUNITY

## 2024-02-28 RX ORDER — BRIMONIDINE TARTRATE 2 MG/ML
SOLUTION/ DROPS OPHTHALMIC
COMMUNITY
Start: 2024-02-01

## 2024-02-28 RX ORDER — CLOPIDOGREL BISULFATE 75 MG/1
1 TABLET ORAL EVERY MORNING
COMMUNITY
Start: 2023-11-21 | End: 2024-11-20

## 2024-02-28 RX ORDER — DORZOLAMIDE HCL 20 MG/ML
SOLUTION/ DROPS OPHTHALMIC
COMMUNITY
Start: 2024-02-01

## 2024-02-28 RX ORDER — PREDNISONE 20 MG/1
TABLET ORAL
Qty: 20 TABLET | Refills: 0 | Status: SHIPPED | OUTPATIENT
Start: 2024-02-28

## 2024-02-28 RX ORDER — DOXYCYCLINE 100 MG/1
100 CAPSULE ORAL EVERY 12 HOURS
Qty: 20 CAPSULE | Refills: 0 | Status: SHIPPED | OUTPATIENT
Start: 2024-02-28 | End: 2024-03-09

## 2024-02-28 NOTE — ASSESSMENT & PLAN NOTE
2/28/2023 chest xray Interstitial lung disease consistent with pulmonary fibrosis, worse as compared to the previous exam 7/26/2023.     9/7/2023 CTA . Biapical fibrotic changes with some new associated calcification at the right apex. Calcified granuloma right lower lobe. New mild subpleural fibrotic changes in both lower lobes.

## 2024-02-28 NOTE — TELEPHONE ENCOUNTER
Telephoned advised worsening pul fibrosis, recommend HRCT chest in about 6 weeks after treatment  for acute COPD exacerbation and follow up review HRCT chest with Dr. Joaquin.pt is agreeable to plan of care.

## 2024-02-28 NOTE — PROGRESS NOTES
Subjective:      Patient ID: Stone Reveles is a 83 y.o. male.  Patient Active Problem List   Diagnosis    Chronic respiratory failure with hypoxia    Hyperglycemia    BMI 29.0-29.9,adult    Benign prostatic hyperplasia without lower urinary tract symptoms    Anxiety    Diverticulosis    Emphysema of lung    Gastroesophageal reflux disease    Encounter for diabetic foot exam    Hyperlipidemia associated with type 2 diabetes mellitus    Calcified granuloma of lung    COPD, moderate    Type 2 diabetes mellitus without complication, without long-term current use of insulin    Interstitial pulmonary disease, unspecified      Immunization History   Administered Date(s) Administered    COVID-19 Vaccine 2021, 2021    COVID-19, MRNA, LN-S, PF (MODERNA FULL 0.5 ML DOSE) 2021, 2021    Influenza - High Dose - PF (65 years and older) 2018    Pneumococcal Conjugate - 13 Valent 2017      Social History     Tobacco Use   Smoking Status Former    Current packs/day: 0.00    Average packs/day: 1 pack/day for 56.0 years (56.0 ttl pk-yrs)    Types: Cigarettes    Start date:     Quit date:     Years since quittin.1   Smokeless Tobacco Current    Types: Snuff      COPD Questionnaire  How often do you cough?: A little of the time  How often do you have phlegm (mucus) in your chest?: A little of the time  How often does your chest feel tight?: Never  When you walk up a hill or one flight of stairs, how often are you breathless?: All of the time  How often are you limited doing any activities at home?: Never  How often are you confident leaving the house despite your lung condition?: All of the time  How often do you sleep soundly?: All of the time  How often do you have energy?: Almost never  Total score: 13      Chief Complaint: COPD    HPI    2024 Gahn NP  Stone Reveles is here for acute care visit related to COPD exacerbation over past 5 days. He worked working garden and  "planting potatoes over past 5 days.   Coughing yellow and white mucous. Intermittent wheeze.   No fever. No chills.   No distress.   On home O2 NC 3 L for sleep.  He presents today desats at 85% after walking into clinic. He recovers to 91% on room air at rest.  During 6mwd noted to at rest on room air sao2 85% on NC 2 L increased to 92%. Desats during 6mwd requiring NC 6 L.   2/28/2024 NC 2 L continuous. NC 4 L sleep. NC up to 6L activity.   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   Seen prior by Dr. Joaquin and me.  Last seen by me 2/25/2023 for an acute visit.   He presents stable with compliant of chronic shortness of breath on exertion, if working on tractor and get off and goes to get back on tractor then shortness of breath with pulling himself up on tractor.   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    7/26/2023 6MWD No desaturations requiring supplemental oxygen at rest or exertion.     There is some cough and wheezing with change from Anoro to Trelegy 100 mcg. Trelegy 100 improved over Anoro  He is willing for trial of Trelegy 200 mcg to determine if can improve shortness of breath/cough/wheeze.     On NC 2 L sleep with benefit. No O2 indicated with activity on 6mwd.     Patient is willing to consider pulmonary rehab to improve over all conditioning. Referral made.    56 pack year former smoker. Quit 2013.     2/25/2023 Jenny NP   Mr Anushka Ritter is here for acute care visit. Accompanied by Tatyana dobbins.   Known COPD on ANORO, has been out of Anoror for 2 weeks  LV 10/28/2020 Dr Joaquin  Chest CT 05/2017 compatible with severe emphysema   Has night time oxygen 2.0LPM     Presents with acute flare COPD exacerbation   Patient and Tatyana dobbins who is a Nurse reports she does not feel he was well controlled on Anoro, with break through wheezing, cough, chest congestion and shortness of " "breath exertional   He was taking Anoro daily up until out of Anoro x 2 weeks, did not obtain refill, too expensive, Anoro not on [employer] formulary  Adherent to NC 2 L sleep.  He wonders if can get portable concentrator. Will return for 6mwd after resumes COPD therapy and over acute flare, he has portable tanks and stationary compressor in use.    He is not smoking.   mRC score 2.     /62   Pulse (!) 42 Comment: pacemaker  Resp 20   Ht 5' 11" (1.803 m)   Wt 93 kg (205 lb 0.4 oz)   SpO2 98%   BMI 28.60 kg/m²   Body mass index is 28.6 kg/m².    Review of Systems   Constitutional:  Negative for weight loss, weight gain, activity change and appetite change.   HENT: Negative.     Respiratory:  Positive for cough, sputum production, shortness of breath, wheezing, dyspnea on extertion and use of rescue inhaler.    Cardiovascular: Negative.  Negative for palpitations and leg swelling.   Genitourinary: Negative.    Endocrine:  Negative for cold intolerance and heat intolerance.    Musculoskeletal: Negative.    Gastrointestinal: Negative.    Neurological: Negative.    Hematological:  Negative for adenopathy.   Psychiatric/Behavioral: Negative.       Objective:       Vitals:    02/28/24 1444   BP: 120/62   Pulse: (!) 42   Resp: 20   SpO2: 98%   Weight: 93 kg (205 lb 0.4 oz)   Height: 5' 11" (1.803 m)   Body mass index is 28.6 kg/m².      Physical Exam  Vitals and nursing note reviewed.   Constitutional:       General: He is not in acute distress.     Appearance: He is well-developed. He is not ill-appearing or toxic-appearing.   HENT:      Head: Normocephalic and atraumatic.      Right Ear: External ear normal.      Left Ear: External ear normal.      Nose: Nose normal.      Mouth/Throat:      Pharynx: Uvula midline. No oropharyngeal exudate.   Eyes:      Conjunctiva/sclera: Conjunctivae normal.   Neck:      Thyroid: No thyroid mass or thyromegaly.      Trachea: Trachea normal.   Cardiovascular:      Rate and " Rhythm: Normal rate and regular rhythm.      Heart sounds: Normal heart sounds.   Pulmonary:      Effort: Pulmonary effort is normal.      Breath sounds: Normal breath sounds. No wheezing, rhonchi or rales.   Chest:      Chest wall: There is no dullness to percussion.   Abdominal:      Palpations: Abdomen is soft. There is no splenomegaly or mass.      Tenderness: There is no abdominal tenderness.   Musculoskeletal:         General: Normal range of motion.      Cervical back: Normal range of motion and neck supple.   Skin:     General: Skin is warm and dry.   Neurological:      Mental Status: He is alert and oriented to person, place, and time.   Psychiatric:         Behavior: Behavior normal. Behavior is cooperative.         Thought Content: Thought content normal.         Judgment: Judgment normal.       Personal Diagnostic Review   X-Ray Chest PA And Lateral  Narrative: EXAM: XR CHEST PA AND LATERAL    CLINICAL HISTORY:  COPD.    TECHNIQUE: 2 views of the chest.    COMPARISON: 07/26/2023 x-ray    FINDINGS:  Loop recorder overlies the left chest.  Normal heart size.  Calcification of the thoracic arch.    Interstitial lung disease consistent with pulmonary fibrosis, worse as compared to the previous exam.  Impression:  See above.    Finalized on: 2/28/2024 4:43 PM By:  Liam Encinas MD  [facility]# [id number]      2024-02-28 16:45:06.884    [facility]       Office Spirometry Results:      7/26/2023 spirometry moderate obstruction. FEV1 79.0%     10/28/2020 Normal spirometry.    12/12/2018 spirometry moderate obstruction.FEV1  62.62 % predicted    7/26/2023 6mwd No desaturations requiring supplemental oxygen at rest or exertion.  Phase Oxygen Assessment Supplemental O2 Heart   Rate Blood Pressure Jacky Dyspnea Scale Rating   Resting 95 % Room Air 61 bpm 118/59 0   Exercise             Minute             1 93 % Room Air 80 bpm       2 92 % Room Air 82 bpm       3 91 % Room Air 83 bpm       4 91 % Room Air 82 bpm       5 92 %  Room Air 83 bpm       6  92 % Room Air 83 bpm 128/66 2   Recovery             Minute             1 91 % Room Air 76 bpm       2 95 % Room Air 73 bpm       3 95 % Room Air 64 bpm       4 95 % Room Air 64 bpm 119/61 0      Six Minute Walk Summary  6MWT Status: completed without stopping  Patient Reported: Dyspnea, Other (Comment) (hip pain)           Interpretation:  Did the patient stop or pause?: No  Total Time Walked (Calculated): 360 seconds  Final Partial Lap Distance (feet): 0 feet  Total Distance Meters (Calculated): 365.76 meters  Predicted Distance Meters (Calculated): 482.58 meters  Percentage of Predicted (Calculated): 75.79  Peak VO2 (Calculated): 14.95  Mets: 4.27  Has The Patient Had a Previous Six Minute Walk Test?: No     Previous 6MWT Results  Has The Patient Had a Previous Six Minute Walk Test?: No    X-Ray Chest PA And Lateral  Narrative: EXAM: XR CHEST PA AND LATERAL    CLINICAL HISTORY:  COPD.    TECHNIQUE: 2 views of the chest.    COMPARISON: 07/26/2023 x-ray    FINDINGS:  Loop recorder overlies the left chest.  Normal heart size.  Calcification of the thoracic arch.    Interstitial lung disease consistent with pulmonary fibrosis, worse as compared to the previous exam.  Impression:  See above.    Finalized on: 2/28/2024 4:43 PM By:  Liam Encinas MD  [facility]# [id number]      2024-02-28 16:45:06.884    [facility]      Assessment:       1. COPD with acute exacerbation  X-Ray Chest PA And Lateral    doxycycline (VIBRAMYCIN) 100 MG Cap    predniSONE (DELTASONE) 20 MG tablet      2. Chronic respiratory failure with hypoxia  Stress test, pulmonary    Stress test, pulmonary    OXYGEN FOR HOME USE    CANCELED: OXYGEN FOR HOME USE      3. Centrilobular emphysema  Stress test, pulmonary    Stress test, pulmonary    OXYGEN FOR HOME USE    CANCELED: OXYGEN FOR HOME USE      4. COPD, moderate  Stress test, pulmonary    Stress test, pulmonary    OXYGEN FOR HOME USE    CANCELED: OXYGEN FOR HOME USE      5. Type 2  diabetes mellitus without complication, without long-term current use of insulin      diabetic precautions with prednisone      6. Calcified granuloma of lung        7. Interstitial pulmonary disease, unspecified  CT Chest Without Contrast            Plan:     Requested Prescriptions     Signed Prescriptions Disp Refills    doxycycline (VIBRAMYCIN) 100 MG Cap 20 capsule 0     Sig: Take 1 capsule (100 mg total) by mouth every 12 (twelve) hours. for 10 days    predniSONE (DELTASONE) 20 MG tablet 20 tablet 0     Sig: 3 daily x 3 days, 2 daily x 3 days, 1 daily x 3 days, 1/2 daily x 4 days.       Problem List Items Addressed This Visit       Type 2 diabetes mellitus without complication, without long-term current use of insulin     Not well controlled, continued management with Primary Care Provider.  Diabetic precautions with prednisone for COPD exacerbation  Risk and benefits of steroid therapy in diabetic patient were reviewed, patient is cautioned to monitor blood sugars and discontinue steroid therapy for any elevations above 300.      Hemoglobin A1C   Date Value Ref Range Status   09/06/2023 7.0 (H) 4.8 - 5.6 % Final     Comment:              Prediabetes: 5.7 - 6.4           Diabetes: >6.4           Glycemic control for adults with diabetes: <7.0   05/05/2017 7.3 (H) 4.5 - 6.2 % Final     Comment:     According to ADA guidelines, hemoglobin A1C <7.0% represents  optimal control in non-pregnant diabetic patients.  Different  metrics may apply to specific populations.   Standards of Medical Care in Diabetes - 2016.  For the purpose of screening for the presence of diabetes:  <5.7%     Consistent with the absence of diabetes  5.7-6.4%  Consistent with increasing risk for diabetes   (prediabetes)  >or=6.5%  Consistent with diabetes  Currently no consensus exists for use of hemoglobin A1C  for diagnosis of diabetes for children.              Interstitial pulmonary disease, unspecified     2/28/2023 chest xray  Interstitial lung disease consistent with pulmonary fibrosis, worse as compared to the previous exam 7/26/2023.   2/28/2024 symptomatically worse over past week with increased O2 needs NC 2 L continuous. NC 3-4 Lsleep, NC up to 6 L activity.  Acute COPD flare vs chronic worsening pul fibrosis  Follow up 6 weeks HRCT repeat 6mwd.     9/7/2023 CTA . Biapical fibrotic changes with some new associated calcification at the right apex. Calcified granuloma right lower lobe. New mild subpleural fibrotic changes in both lower lobes.          Relevant Orders    CT Chest Without Contrast    Emphysema of lung     See COPD          Relevant Orders    Stress test, pulmonary (Completed)    Stress test, pulmonary    OXYGEN FOR HOME USE    COPD, moderate     Acute flare  Doxy/prednisone  Continue Trelegy 200, Albuterol inhaler. Albuterol nebs  On O2 NC 3 L sleep  2/28/2024 6mwd abnormal, new order NC 2 L continuous up to NC 6 L activity. NC 4 L sleep.   Return in 8 weeks repeat 6mwd, patient having acute COPD exacerbation      Risk and benefits of steroid therapy were reviewed in detail and include, but not limited to, elevated blood sugars, joint avascular necrosis, necrosis of tissue or lipodystrophy or infection of the injection site, hallucinations, depression or worsening depression, insomnia, sweating, hyperactivity, tremors, suppression of one's own cortisone production, delayed wound healing and GI bleeding. The patient would like to proceed with steroid treatment knowing and verbally accepting the risks.           Relevant Orders    Stress test, pulmonary (Completed)    Stress test, pulmonary    OXYGEN FOR HOME USE    Chronic respiratory failure with hypoxia     Prior on NC 3 L sleep.  2/28/2024 6mwd abnormal, new order NC 2 L continuous up to NC 6 L activity. NC 4 L sleep.          Relevant Orders    Stress test, pulmonary (Completed)    Stress test, pulmonary    OXYGEN FOR HOME USE    Calcified granuloma of lung      9/7/2023 CTA Emphysema. Partially calcified nodule right upper lobe measuring 1.3 x 0.8 cm is unchanged and consistent with granuloma or hamartoma. Biapical fibrotic changes with some new associated calcification at the right apex. Calcified granuloma right lower lobe.           Other Visit Diagnoses       COPD with acute exacerbation    -  Primary    Relevant Medications    doxycycline (VIBRAMYCIN) 100 MG Cap    predniSONE (DELTASONE) 20 MG tablet    Other Relevant Orders    X-Ray Chest PA And Lateral (Completed)          I spent a total of 53 minutes on the day of the visit.  This includes face to face time and non-face to face time preparing to see the patient (eg, review of tests), obtaining and/or reviewing separately obtained history, documenting clinical information in the electronic or other health record, independently interpreting results and communicating results to the patient/family/caregiver, or care coordinator.    Follow up in about 6 weeks (around 4/8/2024) for COPD/pulmonary fibrosis review 6MWD/HRCT chest w/pulmonologist Dr. Joaquin.    Thank you for the courtesy of participating in the care of this patient    Nessa Alvarado NP

## 2024-02-28 NOTE — ASSESSMENT & PLAN NOTE
9/7/2023 CTA Emphysema. Partially calcified nodule right upper lobe measuring 1.3 x 0.8 cm is unchanged and consistent with granuloma or hamartoma. Biapical fibrotic changes with some new associated calcification at the right apex. Calcified granuloma right lower lobe.

## 2024-02-28 NOTE — PROCEDURES
"[facility] - Pulmonary Function  Six Minute Walk     SUMMARY     Ordering Provider: MACARIO Aguirre NP   Interpreting Provider: Dr Alvarado  Performing nurse/tech/RT: ADALBERTO Francisco RRT  Diagnosis: COPD  Height: 5' 11" (180.3 cm)  Weight: 93 kg (205 lb 0.4 oz)  BMI (Calculated): 28.6   Patient Race:             Phase Oxygen Assessment Supplemental O2 Heart   Rate Blood Pressure Jacky Dyspnea Scale Rating   Resting 85 % (pt placed on NC 2lpm O2 sat 92%) Room Air 42 bpm 137/63 2   Exercise        Minute        1 88 % (increased to 3lpm) 2 L/M 50 bpm     2 82 % (increased to 4lpm O2 sat 93%) 3 L/M 46 bpm     3 95 % 4 L/M 70 bpm     4 87 % (increased to 6lpm) 4 L/M 75 bpm     5 99 % 6 L/M 80 bpm     6  96 % 6 L/M 49 bpm 152/68 5-6   Recovery        Minute        1 92 % 6 L/M 55 bpm     2 95 % 6 L/M 83 bpm     3 99 % 6 L/M 80 bpm     4 98 % 6 L/M 78 bpm 154/74 1     Six Minute Walk Summary  6MWT Status: completed with stops  Patient Reported: Dyspnea     Interpretation:  Did the patient stop or pause?: Yes  How many times did the patient stop or pause?: 1  Stop Time 1: 170  Restart Time 1: 200  Pause Time 1: 30 seconds                             Total Time Walked (Calculated): 330 seconds  Final Partial Lap Distance (feet): 125 feet  Total Distance Meters (Calculated): 160.02 meters  Predicted Distance Meters (Calculated): 475.53 meters  Percentage of Predicted (Calculated): 33.65  Peak VO2 (Calculated): 8.78  Mets: 2.51  Has The Patient Had a Previous Six Minute Walk Test?: Yes       Previous 6MWT Results  Has The Patient Had a Previous Six Minute Walk Test?: Yes  Date of Previous Test: 07/26/23  Total Time Walked: 360 seconds  Total Distance (meters): 365  Predicted Distance (meters): 482 meters  Percentage of Predicted: 75  Percent Change (Calculated): 0.56    "

## 2024-02-28 NOTE — ASSESSMENT & PLAN NOTE
Not well controlled, continued management with Primary Care Provider.  Diabetic precautions with prednisone for COPD exacerbation  Risk and benefits of steroid therapy in diabetic patient were reviewed, patient is cautioned to monitor blood sugars and discontinue steroid therapy for any elevations above 300.      Hemoglobin A1C   Date Value Ref Range Status   09/06/2023 7.0 (H) 4.8 - 5.6 % Final     Comment:              Prediabetes: 5.7 - 6.4           Diabetes: >6.4           Glycemic control for adults with diabetes: <7.0   05/05/2017 7.3 (H) 4.5 - 6.2 % Final     Comment:     According to ADA guidelines, hemoglobin A1C <7.0% represents  optimal control in non-pregnant diabetic patients.  Different  metrics may apply to specific populations.   Standards of Medical Care in Diabetes - 2016.  For the purpose of screening for the presence of diabetes:  <5.7%     Consistent with the absence of diabetes  5.7-6.4%  Consistent with increasing risk for diabetes   (prediabetes)  >or=6.5%  Consistent with diabetes  Currently no consensus exists for use of hemoglobin A1C  for diagnosis of diabetes for children.

## 2024-02-28 NOTE — ASSESSMENT & PLAN NOTE
2/28/2023 chest xray Interstitial lung disease consistent with pulmonary fibrosis, worse as compared to the previous exam 7/26/2023.   2/28/2024 symptomatically worse over past week with increased O2 needs NC 2 L continuous. NC 3-4 Lsleep, NC up to 6 L activity.  Acute COPD flare vs chronic worsening pul fibrosis  Follow up 6 weeks HRCT repeat 6mwd.     9/7/2023 CTA . Biapical fibrotic changes with some new associated calcification at the right apex. Calcified granuloma right lower lobe. New mild subpleural fibrotic changes in both lower lobes.

## 2024-02-28 NOTE — Clinical Note
Please move appointment about 6 weeks to Dr. Joaquin with 6mwd and HRCT chest related to worsening pulmonary fibrosis. Check with Rubina if don't see opening. He needs to see pulmonologist, followed by Dr. Joaquin. Let me know if problem and I will see him back and discuss with keiry. Thank you

## 2024-02-28 NOTE — ASSESSMENT & PLAN NOTE
Prior on NC 3 L sleep.  2/28/2024 6mwd abnormal, new order NC 2 L continuous up to NC 6 L activity. NC 4 L sleep.

## 2024-04-03 ENCOUNTER — PATIENT MESSAGE (OUTPATIENT)
Dept: PULMONOLOGY | Facility: CLINIC | Age: 84
End: 2024-04-03
Payer: MEDICARE

## 2024-06-03 PROBLEM — J96.11 CHRONIC RESPIRATORY FAILURE WITH HYPOXIA: Status: RESOLVED | Noted: 2017-05-05 | Resolved: 2024-06-03

## 2024-07-17 ENCOUNTER — OFFICE VISIT (OUTPATIENT)
Dept: PULMONOLOGY | Facility: CLINIC | Age: 84
End: 2024-07-17
Payer: MEDICARE

## 2024-07-17 ENCOUNTER — CLINICAL SUPPORT (OUTPATIENT)
Dept: PULMONOLOGY | Facility: CLINIC | Age: 84
End: 2024-07-17
Payer: MEDICARE

## 2024-07-17 ENCOUNTER — LAB VISIT (OUTPATIENT)
Dept: LAB | Facility: HOSPITAL | Age: 84
End: 2024-07-17
Attending: PHYSICIAN ASSISTANT
Payer: MEDICARE

## 2024-07-17 VITALS
BODY MASS INDEX: 29.69 KG/M2 | SYSTOLIC BLOOD PRESSURE: 136 MMHG | WEIGHT: 212 LBS | HEIGHT: 71 IN | RESPIRATION RATE: 18 BRPM | DIASTOLIC BLOOD PRESSURE: 76 MMHG | WEIGHT: 212.06 LBS | OXYGEN SATURATION: 93 % | BODY MASS INDEX: 29.68 KG/M2 | HEART RATE: 78 BPM | HEIGHT: 71 IN

## 2024-07-17 DIAGNOSIS — J84.9 ILD (INTERSTITIAL LUNG DISEASE): ICD-10-CM

## 2024-07-17 DIAGNOSIS — J44.9 COPD, MODERATE: ICD-10-CM

## 2024-07-17 DIAGNOSIS — J96.11 CHRONIC RESPIRATORY FAILURE WITH HYPOXIA: ICD-10-CM

## 2024-07-17 DIAGNOSIS — J84.9 ILD (INTERSTITIAL LUNG DISEASE): Primary | ICD-10-CM

## 2024-07-17 DIAGNOSIS — J43.2 CENTRILOBULAR EMPHYSEMA: ICD-10-CM

## 2024-07-17 DIAGNOSIS — J84.10 CALCIFIED GRANULOMA OF LUNG: ICD-10-CM

## 2024-07-17 LAB
BNP SERPL-MCNC: 148 PG/ML (ref 0–99)
CRP SERPL-MCNC: 1.6 MG/L (ref 0–8.2)
ERYTHROCYTE [SEDIMENTATION RATE] IN BLOOD BY WESTERGREN METHOD: 9 MM/HR (ref 0–10)
RHEUMATOID FACT SERPL-ACNC: <13 IU/ML (ref 0–15)

## 2024-07-17 PROCEDURE — 3288F FALL RISK ASSESSMENT DOCD: CPT | Mod: CPTII,S$GLB,, | Performed by: PHYSICIAN ASSISTANT

## 2024-07-17 PROCEDURE — 99214 OFFICE O/P EST MOD 30 MIN: CPT | Mod: 25,S$GLB,, | Performed by: PHYSICIAN ASSISTANT

## 2024-07-17 PROCEDURE — 82164 ANGIOTENSIN I ENZYME TEST: CPT | Performed by: PHYSICIAN ASSISTANT

## 2024-07-17 PROCEDURE — 1160F RVW MEDS BY RX/DR IN RCRD: CPT | Mod: CPTII,S$GLB,, | Performed by: PHYSICIAN ASSISTANT

## 2024-07-17 PROCEDURE — 36415 COLL VENOUS BLD VENIPUNCTURE: CPT | Performed by: PHYSICIAN ASSISTANT

## 2024-07-17 PROCEDURE — 86431 RHEUMATOID FACTOR QUANT: CPT | Performed by: PHYSICIAN ASSISTANT

## 2024-07-17 PROCEDURE — 86038 ANTINUCLEAR ANTIBODIES: CPT | Performed by: PHYSICIAN ASSISTANT

## 2024-07-17 PROCEDURE — 3075F SYST BP GE 130 - 139MM HG: CPT | Mod: CPTII,S$GLB,, | Performed by: PHYSICIAN ASSISTANT

## 2024-07-17 PROCEDURE — 83880 ASSAY OF NATRIURETIC PEPTIDE: CPT | Performed by: PHYSICIAN ASSISTANT

## 2024-07-17 PROCEDURE — 86140 C-REACTIVE PROTEIN: CPT | Performed by: PHYSICIAN ASSISTANT

## 2024-07-17 PROCEDURE — 94618 PULMONARY STRESS TESTING: CPT | Mod: S$GLB,,, | Performed by: INTERNAL MEDICINE

## 2024-07-17 PROCEDURE — 85651 RBC SED RATE NONAUTOMATED: CPT | Performed by: PHYSICIAN ASSISTANT

## 2024-07-17 PROCEDURE — 1101F PT FALLS ASSESS-DOCD LE1/YR: CPT | Mod: CPTII,S$GLB,, | Performed by: PHYSICIAN ASSISTANT

## 2024-07-17 PROCEDURE — 3078F DIAST BP <80 MM HG: CPT | Mod: CPTII,S$GLB,, | Performed by: PHYSICIAN ASSISTANT

## 2024-07-17 PROCEDURE — 1159F MED LIST DOCD IN RCRD: CPT | Mod: CPTII,S$GLB,, | Performed by: PHYSICIAN ASSISTANT

## 2024-07-17 PROCEDURE — 99999 PR PBB SHADOW E&M-EST. PATIENT-LVL V: CPT | Mod: PBBFAC,,, | Performed by: PHYSICIAN ASSISTANT

## 2024-07-17 RX ORDER — METOPROLOL SUCCINATE 25 MG/1
25 TABLET, EXTENDED RELEASE ORAL 2 TIMES DAILY
COMMUNITY
Start: 2024-04-27

## 2024-07-17 RX ORDER — PREDNISOLONE ACETATE 10 MG/ML
SUSPENSION/ DROPS OPHTHALMIC
COMMUNITY
Start: 2024-06-10

## 2024-07-17 NOTE — PROGRESS NOTES
Subjective:      Patient ID: Stone Reveles is a 83 y.o. male.  Patient Active Problem List   Diagnosis    Hyperglycemia    BMI 29.0-29.9,adult    Benign prostatic hyperplasia without lower urinary tract symptoms    Anxiety    Diverticulosis    Emphysema of lung    Gastroesophageal reflux disease    Encounter for diabetic foot exam    Hyperlipidemia associated with type 2 diabetes mellitus    Calcified granuloma of lung    COPD, moderate    Type 2 diabetes mellitus without complication, without long-term current use of insulin    Interstitial pulmonary disease, unspecified      Immunization History   Administered Date(s) Administered    COVID-19 Vaccine 2021, 2021    COVID-19, MRNA, LN-S, PF (MODERNA FULL 0.5 ML DOSE) 2021, 2021    Influenza - High Dose - PF (65 years and older) 2018    Pneumococcal Conjugate - 13 Valent 2017      Social History     Tobacco Use   Smoking Status Former    Current packs/day: 0.00    Average packs/day: 1 pack/day for 56.0 years (56.0 ttl pk-yrs)    Types: Cigarettes    Start date:     Quit date:     Years since quittin.5   Smokeless Tobacco Current    Types: Snuff             Chief Complaint: COPD    HPI     2024   Established COPD patient here for exacerbation follow up  New to me, previously followed by other pulmonary NP  Seen 8 weeks ago for COPD exacerbation, increased oxygen requirement from 2-6L with activity  He was given prednisone taper and doxycyline  Doing better today; less SOB and cough, no wheezing  On Trelegy daily, albuterol prn  Portable oxygen tanks from [facility]  He is requesting order for inogen portable concentrator  Chest X Ray last visit with Interstitial lung disease consistent with pulmonary fibrosis, worse as compared to the previous exam.   Previous Chest X Rays with chronic interstitial changes, has not had HRCT  He reports his brother had pulmonary fibrosis     2024 Jenny NP  Stone Reveles is  "here for acute care visit related to COPD exacerbation over past 5 days. He worked working garden and planting potatoes over past 5 days.   Coughing yellow and white mucous. Intermittent wheeze.   No fever. No chills.   No distress.   On home O2 NC 3 L for sleep.  He presents today desats at 85% after walking into clinic. He recovers to 91% on room air at rest.  During 6mwd noted to at rest on room air sao2 85% on NC 2 L increased to 92%. Desats during 6mwd requiring NC 6 L.   2/28/2024 NC 2 L continuous. NC 4 L sleep. NC up to 6L activity.   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   Seen prior by Dr. Joaquin and me.  Last seen by me 2/25/2023 for an acute visit.   He presents stable with compliant of chronic shortness of breath on exertion, if working on tractor and get off and goes to get back on tractor then shortness of breath with pulling himself up on tractor.   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    7/26/2023 6MWD No desaturations requiring supplemental oxygen at rest or exertion.     There is some cough and wheezing with change from Anoro to Trelegy 100 mcg. Trelegy 100 improved over Anoro  He is willing for trial of Trelegy 200 mcg to determine if can improve shortness of breath/cough/wheeze.     On NC 2 L sleep with benefit. No O2 indicated with activity on 6mwd.     Patient is willing to consider pulmonary rehab to improve over all conditioning. Referral made.    56 pack year former smoker. Quit 2013.     2/25/2023 Jenny NP   Mr Anushka Ritter is here for acute care visit. Accompanied by Tatyana dobbins.   Known COPD on ANORO, has been out of Anoror for 2 weeks  LV 10/28/2020 Dr Joaquin  Chest CT 05/2017 compatible with severe emphysema   Has night time oxygen 2.0LPM     Presents with acute flare COPD exacerbation   Patient and Tatyana dobbins who is a Nurse reports she does not feel he " "was well controlled on Anoro, with break through wheezing, cough, chest congestion and shortness of breath exertional   He was taking Anoro daily up until out of Anoro x 2 weeks, did not obtain refill, too expensive, Anoro not on [employer] formulary  Adherent to NC 2 L sleep.  He wonders if can get portable concentrator. Will return for 6mwd after resumes COPD therapy and over acute flare, he has portable tanks and stationary compressor in use.    He is not smoking.   mRC score 2.     /76   Pulse 78   Resp 18   Ht 5' 11" (1.803 m)   Wt 96.2 kg (212 lb 1.3 oz)   SpO2 (!) 93%   BMI 29.58 kg/m²   Body mass index is 29.58 kg/m².    Review of Systems   Constitutional:  Negative for weight loss, weight gain, activity change and appetite change.   HENT: Negative.     Respiratory:  Positive for cough, sputum production, shortness of breath, dyspnea on extertion and use of rescue inhaler. Negative for wheezing.    Cardiovascular: Negative.  Negative for palpitations and leg swelling.   Genitourinary: Negative.    Endocrine:  Negative for cold intolerance and heat intolerance.    Musculoskeletal: Negative.    Gastrointestinal: Negative.    Neurological: Negative.    Hematological:  Negative for adenopathy.   Psychiatric/Behavioral: Negative.       Objective:       Vitals:    07/17/24 0818   BP: 136/76   Pulse: 78   Resp: 18   SpO2: (!) 93%   Weight: 96.2 kg (212 lb 1.3 oz)   Height: 5' 11" (1.803 m)   Body mass index is 29.58 kg/m².      Physical Exam  Vitals and nursing note reviewed.   Constitutional:       General: He is not in acute distress.     Appearance: He is well-developed. He is not ill-appearing or toxic-appearing.   HENT:      Head: Normocephalic and atraumatic.      Right Ear: External ear normal.      Left Ear: External ear normal.      Nose: Nose normal.      Mouth/Throat:      Pharynx: Uvula midline. No oropharyngeal exudate.   Eyes:      Conjunctiva/sclera: Conjunctivae normal.   Neck:      Thyroid: " No thyroid mass or thyromegaly.      Trachea: Trachea normal.   Cardiovascular:      Rate and Rhythm: Normal rate and regular rhythm.      Heart sounds: Normal heart sounds.   Pulmonary:      Effort: Pulmonary effort is normal.      Breath sounds: Normal breath sounds. No wheezing, rhonchi or rales.   Chest:      Chest wall: There is no dullness to percussion.   Abdominal:      Palpations: Abdomen is soft. There is no splenomegaly or mass.      Tenderness: There is no abdominal tenderness.   Musculoskeletal:         General: Normal range of motion.      Cervical back: Normal range of motion and neck supple.   Skin:     General: Skin is warm and dry.   Neurological:      Mental Status: He is alert and oriented to person, place, and time.   Psychiatric:         Behavior: Behavior normal. Behavior is cooperative.         Thought Content: Thought content normal.         Judgment: Judgment normal.       Personal Diagnostic Review   X-Ray Chest PA And Lateral  Narrative: EXAM: XR CHEST PA AND LATERAL    CLINICAL HISTORY:  COPD.    TECHNIQUE: 2 views of the chest.    COMPARISON: 07/26/2023 x-ray    FINDINGS:  Loop recorder overlies the left chest.  Normal heart size.  Calcification of the thoracic arch.    Interstitial lung disease consistent with pulmonary fibrosis, worse as compared to the previous exam.  Impression:  See above.    Finalized on: 2/28/2024 4:43 PM By:  Liam Encinas MD  R# [id number]      2024-02-28 16:45:06.884    [facility]       Office Spirometry Results:      7/26/2023 spirometry moderate obstruction. FEV1 79.0%     10/28/2020 Normal spirometry.    12/12/2018 spirometry moderate obstruction.FEV1  62.62 % predicted    7/26/2023 6mwd No desaturations requiring supplemental oxygen at rest or exertion.  Phase Oxygen Assessment Supplemental O2 Heart   Rate Blood Pressure Jacky Dyspnea Scale Rating   Resting 95 % Room Air 61 bpm 118/59 0   Exercise             Minute             1 93 % Room Air 80 bpm       2  92 % Room Air 82 bpm       3 91 % Room Air 83 bpm       4 91 % Room Air 82 bpm       5 92 % Room Air 83 bpm       6  92 % Room Air 83 bpm 128/66 2   Recovery             Minute             1 91 % Room Air 76 bpm       2 95 % Room Air 73 bpm       3 95 % Room Air 64 bpm       4 95 % Room Air 64 bpm 119/61 0      Six Minute Walk Summary  6MWT Status: completed without stopping  Patient Reported: Dyspnea, Other (Comment) (hip pain)           Interpretation:  Did the patient stop or pause?: No  Total Time Walked (Calculated): 360 seconds  Final Partial Lap Distance (feet): 0 feet  Total Distance Meters (Calculated): 365.76 meters  Predicted Distance Meters (Calculated): 482.58 meters  Percentage of Predicted (Calculated): 75.79  Peak VO2 (Calculated): 14.95  Mets: 4.27  Has The Patient Had a Previous Six Minute Walk Test?: No     Previous 6MWT Results  Has The Patient Had a Previous Six Minute Walk Test?: No    X-Ray Chest PA And Lateral  Narrative: EXAM: XR CHEST PA AND LATERAL    CLINICAL HISTORY:  COPD.    TECHNIQUE: 2 views of the chest.    COMPARISON: 07/26/2023 x-ray    FINDINGS:  Loop recorder overlies the left chest.  Normal heart size.  Calcification of the thoracic arch.    Interstitial lung disease consistent with pulmonary fibrosis, worse as compared to the previous exam.  Impression:  See above.    Finalized on: 2/28/2024 4:43 PM By:  Liam Encinas MD  [facility]# [id number]      2024-02-28 16:45:06.884    [facility]    Phase Oxygen Assessment Supplemental O2 Heart   Rate Blood Pressure Jacky Dyspnea Scale Rating   Resting 93 % Room Air 78 bpm 136/76 4   Exercise             Minute             1 86 % Room Air 91 bpm       2 90 % 2 L/M 89 bpm       3 89 % 2 L/M 90 bpm       4 86 % 2 L/M 91 bpm       5 89 % 3 L/M 114 bpm       6  89 % 3 L/M 115 bpm 141/72 4   Recovery             Minute             1 92 % 3 L/M 72 bpm       2 96 % 3 L/M 70 bpm       3 95 % 3 L/M 76 bpm       4 97 % 3 L/M 76 bpm 141/72 2      Six Minute  Walk Summary  6MWT Status: completed without stopping  Patient Reported: Dyspnea         Interpretation:  Did the patient stop or pause?: No  Total Time Walked (Calculated): 360 seconds  Final Partial Lap Distance (feet): 0 feet  Total Distance Meters (Calculated): 243.84 meters  Predicted Distance Meters (Calculated): 469.99 meters  Percentage of Predicted (Calculated): 51.88  Peak VO2 (Calculated): 11.3  Mets: 3.23  Has The Patient Had a Previous Six Minute Walk Test?: Yes     Previous 6MWT Results  Has The Patient Had a Previous Six Minute Walk Test?: Yes  Date of Previous Test: 02/28/28  Total Time Walked: 330 seconds  Total Distance (meters): 160.02  Predicted Distance (meters): 475.53 meters  Percentage of Predicted: 33.65  Percent Change (Calculated): -0.52       Assessment:       1. ILD (interstitial lung disease)  SCARLET Screen w/Reflex    RHEUMATOID FACTOR    Sedimentation rate    C-REACTIVE PROTEIN    B-TYPE NATRIURETIC PEPTIDE    Angiotensin Converting Enzyme    Complete PFT without bronchodilator - Clinic      2. Chronic respiratory failure with hypoxia  OXYGEN FOR HOME USE      3. COPD, moderate        4. Centrilobular emphysema        5. Calcified granuloma of lung              Plan:     Continue Trelegy daily, albuterol prn  3L oxygen with activity and sleep  Order faxed to [employer] for portable concentrator  Labs today for ILD  HRCT, PFT ordered and request visit with pulmonologist next    Requested Prescriptions      No prescriptions requested or ordered in this encounter       Problem List Items Addressed This Visit          Pulmonary    Emphysema of lung    Calcified granuloma of lung    COPD, moderate     Other Visit Diagnoses       ILD (interstitial lung disease)    -  Primary    Relevant Orders    SCARLET Screen w/Reflex    RHEUMATOID FACTOR    Sedimentation rate    C-REACTIVE PROTEIN    B-TYPE NATRIURETIC PEPTIDE    Angiotensin Converting Enzyme    Complete PFT without bronchodilator - Clinic     Chronic respiratory failure with hypoxia        Relevant Orders    OXYGEN FOR HOME USE            Follow up in about 8 weeks (around 9/11/2024) for labs today; chest ct, PFT, and f/u pulmonologist next.      Discussed diagnosis, its evaluation, treatment and usual course. All questions answered.    Patient verbalized understanding of plan and left in no acute distress    Thank you for the courtesy of participating in the care of this patient    Yue Resendiz PA-C  [facility]

## 2024-07-17 NOTE — PROCEDURES
"[facility] - Pulmonary Function  Six Minute Walk     SUMMARY     Ordering Provider: ARIANE Alvarado   Interpreting Provider: Dr. Joaquin  Performing nurse/tech/RT: JOHAN Oneal, RRT  Diagnosis:  (chronic RF with Hypoxia)  Height: 5' 11" (180.3 cm)  Weight: 96.2 kg (211 lb 15.6 oz)  BMI (Calculated): 29.6   Patient Race:             Phase Oxygen Assessment Supplemental O2 Heart   Rate Blood Pressure Jacky Dyspnea Scale Rating   Resting 93 % Room Air 78 bpm 136/76 4   Exercise        Minute        1 86 % Room Air 91 bpm     2 90 % 2 L/M 89 bpm     3 89 % 2 L/M 90 bpm     4 86 % 2 L/M 91 bpm     5 89 % 3 L/M 114 bpm     6  89 % 3 L/M 115 bpm 141/72 4   Recovery        Minute        1 92 % 3 L/M 72 bpm     2 96 % 3 L/M 70 bpm     3 95 % 3 L/M 76 bpm     4 97 % 3 L/M 76 bpm 141/72 2     Six Minute Walk Summary  6MWT Status: completed without stopping  Patient Reported: Dyspnea     Interpretation:  Did the patient stop or pause?: No            Total Time Walked (Calculated): 360 seconds  Final Partial Lap Distance (feet): 0 feet  Total Distance Meters (Calculated): 243.84 meters  Predicted Distance Meters (Calculated): 469.99 meters  Percentage of Predicted (Calculated): 51.88  Peak VO2 (Calculated): 11.3  Mets: 3.23  Has The Patient Had a Previous Six Minute Walk Test?: Yes       Previous 6MWT Results  Has The Patient Had a Previous Six Minute Walk Test?: Yes  Date of Previous Test: 02/28/28  Total Time Walked: 330 seconds  Total Distance (meters): 160.02  Predicted Distance (meters): 475.53 meters  Percentage of Predicted: 33.65  Percent Change (Calculated): -0.52           CLINICAL INTERPRETATION:  Six minute walk distance is 243.84m (51.88 % predicted) with light dyspnea.  During exercise, there was significant desaturation while breathing room air.  Oxygen was added 2.0-3.0 LPM  Blood pressure remained stable and Heart rate remained stable with walking.     The patient did not report non-pulmonary symptoms during " exercise.  The patient did complete the study, walking 360 seconds of the 360 second test.  The patient may benefit from using supplemental oxygen during exertion.  Since the previous study in 02/28/2024, exercise capacity may be somewhat improved.  Based upon age and body mass index, exercise capacity is less than predicted.      [] Mild exercise-induced hypoxemia described as an arterial oxygen saturation of 93-95% (or 3-4% less than at rest)  []  Moderate exercise-induced hypoxemia as 89-93%  [x]  Severe exercise induced hypoxemia as < 89% O2 saturation.  Medicare Criteria for oxygen prescription comments:   [x]  When arterial oxygen saturation is at or below 88% during exercise (severe exercise induced hypoxemia) then the patient falls under Medicare Group 1 criteria for supplemental oxygen

## 2024-07-18 LAB — ANA SER QL IF: NORMAL

## 2024-07-19 LAB — ACE SERPL-CCNC: 36 U/L (ref 16–85)

## 2024-08-12 DIAGNOSIS — J44.9 COPD, MODERATE: ICD-10-CM

## 2024-08-13 RX ORDER — FLUTICASONE FUROATE, UMECLIDINIUM BROMIDE AND VILANTEROL TRIFENATATE 200; 62.5; 25 UG/1; UG/1; UG/1
1 POWDER RESPIRATORY (INHALATION)
Qty: 180 EACH | Refills: 3 | Status: SHIPPED | OUTPATIENT
Start: 2024-08-13

## 2024-09-13 ENCOUNTER — HOSPITAL ENCOUNTER (OUTPATIENT)
Dept: RADIOLOGY | Facility: HOSPITAL | Age: 84
Discharge: HOME | End: 2024-09-13
Attending: NURSE PRACTITIONER
Payer: MEDICARE

## 2024-09-13 ENCOUNTER — OFFICE VISIT (OUTPATIENT)
Dept: PULMONOLOGY | Facility: CLINIC | Age: 84
End: 2024-09-13
Attending: NURSE PRACTITIONER
Payer: MEDICARE

## 2024-09-13 VITALS
HEIGHT: 71 IN | HEART RATE: 78 BPM | OXYGEN SATURATION: 93 % | RESPIRATION RATE: 14 BRPM | DIASTOLIC BLOOD PRESSURE: 60 MMHG | SYSTOLIC BLOOD PRESSURE: 112 MMHG | WEIGHT: 209.88 LBS | BODY MASS INDEX: 29.38 KG/M2

## 2024-09-13 DIAGNOSIS — J84.10 PULMONARY FIBROSIS: ICD-10-CM

## 2024-09-13 DIAGNOSIS — Z87.891 HISTORY OF SMOKING GREATER THAN 50 PACK YEARS: ICD-10-CM

## 2024-09-13 DIAGNOSIS — J84.9 INTERSTITIAL PULMONARY DISEASE, UNSPECIFIED: ICD-10-CM

## 2024-09-13 DIAGNOSIS — J43.2 CENTRILOBULAR EMPHYSEMA: Primary | ICD-10-CM

## 2024-09-13 DIAGNOSIS — J84.9 ILD (INTERSTITIAL LUNG DISEASE): ICD-10-CM

## 2024-09-13 DIAGNOSIS — Z99.81 ON HOME O2: ICD-10-CM

## 2024-09-13 LAB
BRPFT: ABNORMAL
DLCO ADJ PRE: 10.19 ML/(MIN*MMHG) (ref 18.42–32.28)
DLCO SINGLE BREATH LLN: 18.42
DLCO SINGLE BREATH PRE REF: 40.2 %
DLCO SINGLE BREATH REF: 25.35
DLCOC SBVA LLN: 2.37
DLCOC SBVA PRE REF: 50.9 %
DLCOC SBVA REF: 3.47
DLCOC SINGLE BREATH LLN: 18.42
DLCOC SINGLE BREATH PRE REF: 40.2 %
DLCOC SINGLE BREATH REF: 25.35
DLCOVA LLN: 2.37
DLCOVA PRE REF: 50.9 %
DLCOVA PRE: 1.77 ML/(MIN*MMHG*L) (ref 2.37–4.57)
DLCOVA REF: 3.47
DLVAADJ PRE: 1.77 ML/(MIN*MMHG*L) (ref 2.37–4.57)
ERV LLN: -16449.08
ERV PRE REF: 149.4 %
ERV REF: 0.92
FEF 25 75 LLN: 0.91
FEF 25 75 PRE REF: 44.9 %
FEF 25 75 REF: 2.62
FEV1 FVC LLN: 59
FEV1 FVC PRE REF: 80.8 %
FEV1 FVC REF: 74
FEV1 LLN: 1.94
FEV1 PRE REF: 87.5 %
FEV1 REF: 2.84
FRCPLETH LLN: 2.88
FRCPLETH PREREF: 112.9 %
FRCPLETH REF: 3.87
FVC LLN: 2.77
FVC PRE REF: 107.1 %
FVC REF: 3.88
IVC PRE: 3.27 L (ref 2.77–5)
IVC SINGLE BREATH LLN: 2.77
IVC SINGLE BREATH PRE REF: 84.2 %
IVC SINGLE BREATH REF: 3.88
MVV LLN: 97
MVV PRE REF: 74.7 %
MVV REF: 114
PEF LLN: 4.67
PEF PRE REF: 71.3 %
PEF REF: 7.04
PRE DLCO: 10.19 ML/(MIN*MMHG) (ref 18.42–32.28)
PRE ERV: 1.37 L (ref -16449.08–16450.92)
PRE FEF 25 75: 1.18 L/S (ref 0.91–4.33)
PRE FET 100: 8.8 SEC
PRE FEV1 FVC: 59.81 % (ref 58.83–89.3)
PRE FEV1: 2.49 L (ref 1.94–3.75)
PRE FRC PL: 4.37 L
PRE FVC: 4.16 L (ref 2.77–5)
PRE MVV: 85 L/MIN (ref 96.66–130.78)
PRE PEF: 5.02 L/S (ref 4.67–9.42)
PRE RV: 2.3 L (ref 2.28–3.63)
PRE TLC: 6.49 L (ref 6.15–8.45)
RAW LLN: 3.06
RAW PRE REF: 139.9 %
RAW PRE: 4.28 CMH2O*S/L (ref 3.06–3.06)
RAW REF: 3.06
RV LLN: 2.28
RV PRE REF: 78 %
RV REF: 2.95
RVTLC LLN: 37
RVTLC PRE REF: 76.6 %
RVTLC PRE: 35.51 % (ref 37.35–55.31)
RVTLC REF: 46
TLC LLN: 6.15
TLC PRE REF: 88.9 %
TLC REF: 7.3
VA PRE: 5.77 L (ref 7.15–7.15)
VA SINGLE BREATH LLN: 7.15
VA SINGLE BREATH PRE REF: 80.7 %
VA SINGLE BREATH REF: 7.15
VC LLN: 2.77
VC PRE REF: 107.7 %
VC PRE: 4.18 L (ref 2.77–5)
VC REF: 3.88
VTGRAWPRE: 3.93 L

## 2024-09-13 PROCEDURE — 99999 PR PBB SHADOW E&M-EST. PATIENT-LVL V: CPT | Mod: PBBFAC,,, | Performed by: INTERNAL MEDICINE

## 2024-09-13 PROCEDURE — 71250 CT THORAX DX C-: CPT | Mod: 26,,, | Performed by: RADIOLOGY

## 2024-09-13 PROCEDURE — 71250 CT THORAX DX C-: CPT | Mod: TC

## 2024-09-13 NOTE — PROGRESS NOTES
Pulmonary Outpatient Follow Up Visit     Subjective:       Patient ID: Stone Reveles is a 83 y.o. male.    Chief Complaint: COPD      HPI      83-year-old male patient with known history of COPD/emphysema on O2 during sleep and on exertion maintained on Trelegy Ellipta 200 mcg 1 puff daily presenting for follow-up.      CT scan of the chest reviewed.  Severe upper lobe emphysema.  Mild honeycombing at bases noted.    Review of Systems   Respiratory:  Positive for cough, shortness of breath and dyspnea on extertion.        Outpatient Encounter Medications as of 9/13/2024   Medication Sig Dispense Refill    albuterol (PROVENTIL) 2.5 mg /3 mL (0.083 %) nebulizer solution Take 3 mLs (2.5 mg total) by nebulization every 4 (four) hours as needed for Wheezing or Shortness of Breath. 360 mL 11    albuterol (VENTOLIN HFA) 90 mcg/actuation inhaler Inhale 2 puffs into the lungs every 4 (four) hours as needed for Wheezing or Shortness of Breath (cough). 18 g 11    aspirin 81 MG Chew Take 81 mg by mouth once daily.      atorvastatin (LIPITOR) 40 MG tablet       azithromycin (Z-CHARLENE) 250 MG tablet Take 2 tablets by mouth on day 1; Take 1 tablet by mouth on days 2-5 6 tablet 0    brimonidine 0.2% (ALPHAGAN) 0.2 % Drop Place into both eyes.      chlorpheniramine (CHLOR-TRIMETON) 4 mg tablet Take 4 mg by mouth every 4 (four) hours as needed.      clopidogreL (PLAVIX) 75 mg tablet Take 1 tablet by mouth every morning.      dorzolamide (TRUSOPT) 2 % ophthalmic solution Place into both eyes.      escitalopram oxalate (LEXAPRO) 20 MG tablet Take 20 mg by mouth once daily.      EScitalopram oxalate (LEXAPRO) 20 MG tablet Take 1 tablet by mouth every morning.      fluticasone (FLONASE) 50 mcg/actuation nasal spray 1 spray by Each Nare route once daily.      HYDROcodone-acetaminophen (NORCO) 5-325 mg per tablet       ipratropium (ATROVENT) 42 mcg (0.06 %) nasal spray 2 sprays by Each Nostril route  2 (two) times daily. 15 mL 11    JARDIANCE 10 mg tablet       loratadine (CLARITIN) 10 mg tablet Take 10 mg by mouth once daily.      losartan (COZAAR) 50 MG tablet       losartan (COZAAR) 50 MG tablet Take 1 tablet by mouth once daily.      meloxicam (MOBIC) 15 MG tablet       metformin (GLUCOPHAGE) 500 MG tablet Take 500 mg by mouth every evening.      metoprolol succinate (TOPROL-XL) 25 MG 24 hr tablet Take 25 mg by mouth 2 (two) times daily.      metoprolol tartrate (LOPRESSOR) 25 MG tablet       metoprolol tartrate (LOPRESSOR) 25 MG tablet Take 1 tablet by mouth 2 (two) times daily.      metoprolol tartrate (LOPRESSOR) 50 MG tablet Take 50 mg by mouth 2 (two) times daily.      mupirocin (BACTROBAN) 2 % ointment       pantoprazole (PROTONIX) 40 MG tablet Take 40 mg by mouth once daily.      pantoprazole (PROTONIX) 40 MG tablet Take 1 tablet by mouth once daily.      prednisoLONE acetate (PRED FORTE) 1 % DrpS Place into both eyes.      predniSONE (DELTASONE) 20 MG tablet 3 daily x 3 days, 2 daily x 3 days, 1 daily x 3 days, 1/2 daily x 4 days. 20 tablet 0    RESTASIS 0.05 % ophthalmic emulsion       sildenafiL (VIAGRA) 100 MG tablet       simvastatin (ZOCOR) 10 MG tablet Take 10 mg by mouth once daily.      tamsulosin (FLOMAX) 0.4 mg Cap Take 1 capsule by mouth every evening.      tamsulosin (FLOMAX) 0.4 mg Cp24 Take 0.4 mg by mouth once daily. evening      ticagrelor (BRILINTA) 90 mg tablet Take 90 mg by mouth.      tiZANidine (ZANAFLEX) 2 MG tablet       tiZANidine (ZANAFLEX) 2 MG tablet Take 1 tablet by mouth 2 (two) times daily as needed.      traZODone (DESYREL) 50 MG tablet       traZODone (DESYREL) 50 MG tablet Take 1 tablet by mouth every evening.      TRELEGY ELLIPTA 200-62.5-25 mcg inhaler INHALE 1 PUFF BY MOUTH INTO THE LUNGS ONCE DAILY. 180 each 3     No facility-administered encounter medications on file as of 9/13/2024.       Objective:     Vital Signs (Most Recent)  Vital Signs  Pulse: 78  Resp:  "14  SpO2: (!) 93 %  BP: 112/60  Height and Weight  Height: 5' 11" (180.3 cm)  Weight: 95.2 kg (209 lb 14.4 oz)  BSA (Calculated - sq m): 2.18 sq meters  BMI (Calculated): 29.3  Weight in (lb) to have BMI = 25: 178.9]  Wt Readings from Last 2 Encounters:   09/13/24 95.2 kg (209 lb 14.4 oz)   07/17/24 96.2 kg (212 lb 1.3 oz)       Physical Exam   Constitutional: He is oriented to person, place, and time. He appears well-developed and well-nourished.   Cardiovascular: Normal rate.   Pulmonary/Chest: Normal expansion and effort normal. He has decreased breath sounds. He has rhonchi.   Neurological: He is alert and oriented to person, place, and time.   Psychiatric: His behavior is normal.       Laboratory  Lab Results   Component Value Date    WBC 8.2 09/26/2023    RBC 4.67 05/07/2017    HGB 13.4 09/26/2023    HCT 41.2 09/26/2023    MCV 88 05/07/2017    MCH 29.5 09/26/2023    MCHC 34.1 05/07/2017    RDW 13.3 09/26/2023     09/26/2023    MPV 9.8 05/07/2017    GRAN 11.3 (H) 05/07/2017    GRAN 90.2 (H) 05/07/2017    LYMPH 0.8 (L) 05/07/2017    LYMPH 6.0 (L) 05/07/2017    MONO 0.5 05/07/2017    MONO 3.6 (L) 05/07/2017    EOS 0.7 (H) 09/26/2023    BASO 0.1 09/26/2023    EOSINOPHIL 8 09/26/2023    BASOPHIL 0.1 05/07/2017       BMP  Lab Results   Component Value Date     05/07/2017    K 4.7 05/07/2017     05/07/2017    CO2 22 (L) 05/07/2017    BUN 29 (H) 05/07/2017    CREATININE 1.0 05/07/2017    CALCIUM 8.8 05/07/2017    ANIONGAP 8 05/07/2017    ESTGFRAFRICA >60 05/07/2017    EGFRNONAA >60 05/07/2017    AST 24 05/05/2017    ALT 28 05/05/2017    PROT 7.4 05/05/2017       Lab Results   Component Value Date     (H) 07/17/2024     (H) 09/06/2023     (H) 09/02/2023    BNP 59 01/04/2022    BNP 36 05/05/2017       Lab Results   Component Value Date    TSH 1.222 09/02/2023       Lab Results   Component Value Date    SEDRATE 9 07/17/2024       Lab Results   Component Value Date    CRP 1.6 " "07/17/2024     No results found for: "IGE"     No results found for: "ASPERGILLUS"  No results found for: "AFUMIGATUSCL"     Lab Results   Component Value Date    ACE 36 07/17/2024        Diagnostic Results:    I have personally reviewed today the following studies:    CT chest without contrast9/13/2024    COMPARISON:  02/28/2024, 05/05/2017     FINDINGS:  Base of Neck: No significant abnormality.     Thoracic soft tissues: Normal.     Aorta: Left-sided aortic arch.  No aneurysm.     Heart: Normal size. No effusion. Severe aortic and coronary artery atherosclerotic calcification.     Pulmonary vasculature: Pulmonary arteries distribute normally.     Greta/Mediastinum: No pathologic krzysztof enlargement.     Esophagus: Normal.     Upper Abdomen: No abnormality of the partially imaged upper abdomen.     Airways: Patent.     Lungs/Pleura: Markedly severe upper lobe predominant centrilobular emphysema.  Unchanged benign calcified right apical pleuroparenchymal scarring.  Benign right upper lobe calcified granuloma.  Unchanged posterior left apical pleuroparenchymal scarring.  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  Findings are new compared to 2017.     No suspicious pulmonary nodules.     Bones: No acute fracture. No suspicious lytic or sclerotic lesions.     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 Findings are new compared to 2017.     Markedly severe upper lobe predominant centrilobular emphysema.        CT ANGIOGRAM PULMONARY 9/2023      No central pulmonary embolus however contrast bolus timing is suboptimal with gradual loss of contrast into the more distal branches of all lobar pulmonary arteries. No convincing evidence of pulmonary embolus.     Emphysema with stable partially " calcified right upper lobe nodule consistent with granuloma or hamartoma.     Biapical fibrotic changes with some new associated calcification at the right apex. New mild subpleural fibrotic changes in both lower lobes.     Bilateral nonobstructing renal stones.     Cholelithiasis           Assessment/Plan:   Centrilobular emphysema    Pulmonary fibrosis    On home O2    History of smoking greater than 50 pack years          Continue albuterol continue Trelegy Ellipta.      Not agreeable for Prevnar 20 or pulmonary rehab.    I will request from the team reviewing his scan and his studies for endobronchial valve.    Follow up in about 6 months (around 3/13/2025).    This note was prepared using voice recognition system and is likely to have sound alike errors that may have been overlooked even after proof reading.  Please call me with any questions    Discussed diagnosis, its evaluation, treatment and usual course. All questions answered.      Briana Pandya MD

## 2024-09-26 ENCOUNTER — TELEPHONE (OUTPATIENT)
Dept: PULMONOLOGY | Facility: CLINIC | Age: 84
End: 2024-09-26
Payer: MEDICARE

## 2024-09-26 NOTE — TELEPHONE ENCOUNTER
----- Message from Anna Segovia sent at 9/26/2024 10:54 AM CDT -----  Contact: Mar [phone]  Type:  Needs Medical Advice    Who Called: Mar  Symptoms (please be specific): portable oxygen  How long has patient had these symptoms:    Pharmacy name and phone #:    Would the patient rather a call back or a response via [facility]? Call back   Best Call Back Number: [phone]  Additional Information: Requesting a PA sent to [facility] for oxygen concentrator for the patient. The expedited case number for [facility] [id number]   [facility] [phone]

## 2024-09-26 NOTE — TELEPHONE ENCOUNTER
Spoke to pts niece pt is requesting portable oxygen will contact [employer] to find out status and when he can receive the oxygen

## 2024-09-26 NOTE — TELEPHONE ENCOUNTER
----- Message from Anna Segovia sent at 9/25/2024  4:19 PM CDT -----  Contact: Mar [phone]  Type:  Needs Medical Advice    Who Called:Mar  Symptoms (please be specific): oxygen concentrator  How long has patient had these symptoms:    Pharmacy name and phone #:    Would the patient rather a call back or a response via [facility]? Call back   Best Call Back Number:   Additional Information: Patients nurse called in this afternoon checking on the status of the oxygen concentrator, he has not heard from any of the manufactures and was told to contact his provider and that is what is going on. Please call back Mar with the update

## 2024-09-26 NOTE — TELEPHONE ENCOUNTER
Spoke with patients terijames Mar. Mar wanted to find out the status of patients portal oxygen concentrator. Staff informed patients hari that [employer] stated they had received paperwork and they were verifying patients eligibility coverage and patient would be contacted once a decision has been made. Patients leno expressed understanding.

## 2024-10-01 ENCOUNTER — TELEPHONE (OUTPATIENT)
Dept: PULMONOLOGY | Facility: CLINIC | Age: 84
End: 2024-10-01
Payer: MEDICARE

## 2024-10-01 NOTE — TELEPHONE ENCOUNTER
----- Message from Gretchen sent at 10/1/2024  8:42 AM CDT -----  Contact: [facility]  Type:  Needs Medical Advice    Who Called:  Jayla ely/[facility]  Best Call Back Number:  [phone] Fax: [phone]  Additional Information:  Rep states she faxed over pt portable oxygen concentrator forms, the one ordered [employer]  is not covered by pts insurance. The office need to send orders for portable oxygen concentrator w/o a name on it... Please call or send orders via fax.... Thank you...

## 2024-10-01 NOTE — TELEPHONE ENCOUNTER
Left  for [facility] informing them that we have not received any forms form them for this pt that the documents need to be faxed to our correct fax number and that [facility] does not accept his insurance

## 2024-10-02 ENCOUNTER — TELEPHONE (OUTPATIENT)
Dept: PULMONOLOGY | Facility: CLINIC | Age: 84
End: 2024-10-02
Payer: MEDICARE

## 2024-10-02 NOTE — TELEPHONE ENCOUNTER
----- Message from Anna sent at 10/2/2024  2:39 PM CDT -----  Type:  Needs Medical Advice    Who Called: Praveena with [facility]  Symptoms (please be specific): portable oxygen concentrator  How long has patient had these symptoms:    Pharmacy name and phone #:  [phone]   Would the patient rather a call back or a response via [facility]? Call back   Best Call Back Number: [phone]  Additional Information:   The order needs to be faxed in with no name (manufacture name) fax [phone]. The patient is very upset after sending in the correct information and then to have it sent over wrong.

## 2024-10-02 NOTE — TELEPHONE ENCOUNTER
Returned call to Praveena at [facility] left message on vociemail to return the call

## 2024-10-04 DIAGNOSIS — J43.2 CENTRILOBULAR EMPHYSEMA: Primary | ICD-10-CM

## 2024-12-05 ENCOUNTER — TELEPHONE (OUTPATIENT)
Dept: PULMONOLOGY | Facility: CLINIC | Age: 84
End: 2024-12-05
Payer: MEDICARE

## 2024-12-05 NOTE — TELEPHONE ENCOUNTER
----- Message from Anna sent at 12/5/2024  2:08 PM CST -----  Contact: Mar [phone]  Type: Orders Request    What orders/ testing are being requested?portable oxygen with battery and  ac/dc    Is there a future appointment scheduled for the patient with PCP?none    When?    Would you prefer a response via [facility]?call back     Comments: Mar called in stating the the patient is tripping over the tank and it dosen't have enough time on it and it runs out of oxygen. Mar called in this afternoon requesting the orders be sent to [employer] fax [phone]

## 2024-12-05 NOTE — TELEPHONE ENCOUNTER
Spoke to pts hari advised that pt would like POC with battery pack wants order to go to [employer] advised also that prior auth is needed for [employer] to pay for pts POC explained that the PA should go through [email or web address] when processing order will fax documents as requested

## 2025-01-13 DIAGNOSIS — J44.1 COPD WITH ACUTE EXACERBATION: ICD-10-CM

## 2025-01-13 RX ORDER — PREDNISONE 20 MG/1
TABLET ORAL
Qty: 20 TABLET | Refills: 0 | OUTPATIENT
Start: 2025-01-13

## 2025-02-17 ENCOUNTER — PATIENT MESSAGE (OUTPATIENT)
Dept: PULMONOLOGY | Facility: CLINIC | Age: 85
End: 2025-02-17
Payer: MEDICARE

## 2025-02-18 ENCOUNTER — OFFICE VISIT (OUTPATIENT)
Dept: PULMONOLOGY | Facility: CLINIC | Age: 85
End: 2025-02-18
Payer: MEDICARE

## 2025-02-18 VITALS
OXYGEN SATURATION: 93 % | DIASTOLIC BLOOD PRESSURE: 72 MMHG | SYSTOLIC BLOOD PRESSURE: 128 MMHG | WEIGHT: 215.81 LBS | HEART RATE: 79 BPM | BODY MASS INDEX: 30.21 KG/M2 | HEIGHT: 71 IN | RESPIRATION RATE: 18 BRPM

## 2025-02-18 DIAGNOSIS — J44.1 COPD WITH ACUTE EXACERBATION: ICD-10-CM

## 2025-02-18 DIAGNOSIS — J44.9 COPD, MODERATE: ICD-10-CM

## 2025-02-18 DIAGNOSIS — J44.9 MODERATE COPD (CHRONIC OBSTRUCTIVE PULMONARY DISEASE): ICD-10-CM

## 2025-02-18 DIAGNOSIS — J43.2 CENTRILOBULAR EMPHYSEMA: ICD-10-CM

## 2025-02-18 RX ORDER — PREDNISONE 20 MG/1
10 TABLET ORAL 2 TIMES DAILY
Qty: 10 TABLET | Refills: 0 | Status: SHIPPED | OUTPATIENT
Start: 2025-02-18 | End: 2025-02-18

## 2025-02-18 RX ORDER — LEVOCETIRIZINE DIHYDROCHLORIDE 5 MG/1
5 TABLET, FILM COATED ORAL NIGHTLY
Qty: 30 TABLET | Refills: 11 | Status: SHIPPED | OUTPATIENT
Start: 2025-02-18 | End: 2026-02-18

## 2025-02-18 RX ORDER — PREDNISONE 20 MG/1
10 TABLET ORAL 2 TIMES DAILY
Qty: 10 TABLET | Refills: 1 | Status: SHIPPED | OUTPATIENT
Start: 2025-02-18

## 2025-02-18 RX ORDER — ALBUTEROL SULFATE 0.83 MG/ML
2.5 SOLUTION RESPIRATORY (INHALATION) EVERY 4 HOURS PRN
Qty: 360 ML | Refills: 11 | Status: SHIPPED | OUTPATIENT
Start: 2025-02-18

## 2025-02-18 RX ORDER — ALBUTEROL SULFATE 90 UG/1
2 INHALANT RESPIRATORY (INHALATION) EVERY 4 HOURS PRN
Qty: 18 G | Refills: 11 | Status: SHIPPED | OUTPATIENT
Start: 2025-02-18

## 2025-02-18 NOTE — PROGRESS NOTES
Pulmonary Outpatient Follow Up Visit     Subjective:       Patient ID: Stone Reveles is a 84 y.o. male.    Chief Complaint:SOB    2/18/25  Established patient here for acute care visit  History of severe COPD, ILD, o2 dependent complains of worsening shortness of breath for 3 weeks  Onset occurred after he worked in yard cutting grass  Has associated itchy eyes/runny nose and sneezing  Normal baseline dry cough; denies sputum production  Denies chest pain, fever, or wheezing  Requesting rx for prednisone      9/13/24    83-year-old male patient with known history of COPD/emphysema on O2 during sleep and on exertion maintained on Trelegy Ellipta 200 mcg 1 puff daily presenting for follow-up.    CT scan of the chest reviewed.  Severe upper lobe emphysema.  Mild honeycombing at bases noted.    Review of Systems   HENT:  Positive for rhinorrhea.    Respiratory:  Positive for cough, shortness of breath and dyspnea on extertion.    All other systems reviewed and are negative.      Outpatient Encounter Medications as of 2/18/2025   Medication Sig Dispense Refill    aspirin 81 MG Chew Take 81 mg by mouth once daily.      atorvastatin (LIPITOR) 40 MG tablet       azithromycin (Z-CHARLENE) 250 MG tablet Take 2 tablets by mouth on day 1; Take 1 tablet by mouth on days 2-5 6 tablet 0    brimonidine 0.2% (ALPHAGAN) 0.2 % Drop Place into both eyes.      chlorpheniramine (CHLOR-TRIMETON) 4 mg tablet Take 4 mg by mouth every 4 (four) hours as needed.      dorzolamide (TRUSOPT) 2 % ophthalmic solution Place into both eyes.      escitalopram oxalate (LEXAPRO) 20 MG tablet Take 20 mg by mouth once daily.      EScitalopram oxalate (LEXAPRO) 20 MG tablet Take 1 tablet by mouth every morning.      fluticasone (FLONASE) 50 mcg/actuation nasal spray 1 spray by Each Nare route once daily.      HYDROcodone-acetaminophen (NORCO) 5-325 mg per tablet       ipratropium (ATROVENT) 42 mcg (0.06 %) nasal spray  2 sprays by Each Nostril route 2 (two) times daily. 15 mL 11    JARDIANCE 10 mg tablet       losartan (COZAAR) 50 MG tablet       losartan (COZAAR) 50 MG tablet Take 1 tablet by mouth once daily.      meloxicam (MOBIC) 15 MG tablet       metformin (GLUCOPHAGE) 500 MG tablet Take 500 mg by mouth every evening.      metoprolol succinate (TOPROL-XL) 25 MG 24 hr tablet Take 25 mg by mouth 2 (two) times daily.      metoprolol tartrate (LOPRESSOR) 25 MG tablet       metoprolol tartrate (LOPRESSOR) 25 MG tablet Take 1 tablet by mouth 2 (two) times daily.      metoprolol tartrate (LOPRESSOR) 50 MG tablet Take 50 mg by mouth 2 (two) times daily.      mupirocin (BACTROBAN) 2 % ointment       pantoprazole (PROTONIX) 40 MG tablet Take 40 mg by mouth once daily.      pantoprazole (PROTONIX) 40 MG tablet Take 1 tablet by mouth once daily.      prednisoLONE acetate (PRED FORTE) 1 % DrpS Place into both eyes.      RESTASIS 0.05 % ophthalmic emulsion       sildenafiL (VIAGRA) 100 MG tablet       simvastatin (ZOCOR) 10 MG tablet Take 10 mg by mouth once daily.      tamsulosin (FLOMAX) 0.4 mg Cap Take 1 capsule by mouth every evening.      tamsulosin (FLOMAX) 0.4 mg Cp24 Take 0.4 mg by mouth once daily. evening      tiZANidine (ZANAFLEX) 2 MG tablet       tiZANidine (ZANAFLEX) 2 MG tablet Take 1 tablet by mouth 2 (two) times daily as needed.      traZODone (DESYREL) 50 MG tablet       traZODone (DESYREL) 50 MG tablet Take 1 tablet by mouth every evening.      TRELEGY ELLIPTA 200-62.5-25 mcg inhaler INHALE 1 PUFF BY MOUTH INTO THE LUNGS ONCE DAILY. 180 each 3    [DISCONTINUED] albuterol (PROVENTIL) 2.5 mg /3 mL (0.083 %) nebulizer solution Take 3 mLs (2.5 mg total) by nebulization every 4 (four) hours as needed for Wheezing or Shortness of Breath. 360 mL 11    [DISCONTINUED] albuterol (VENTOLIN HFA) 90 mcg/actuation inhaler Inhale 2 puffs into the lungs every 4 (four) hours as needed for Wheezing or Shortness of Breath (cough). 18 g 11  "   [DISCONTINUED] loratadine (CLARITIN) 10 mg tablet Take 10 mg by mouth once daily.      [DISCONTINUED] predniSONE (DELTASONE) 20 MG tablet 3 daily x 3 days, 2 daily x 3 days, 1 daily x 3 days, 1/2 daily x 4 days. 20 tablet 0    albuterol (PROVENTIL) 2.5 mg /3 mL (0.083 %) nebulizer solution Take 3 mLs (2.5 mg total) by nebulization every 4 (four) hours as needed for Wheezing or Shortness of Breath. 360 mL 11    albuterol (VENTOLIN HFA) 90 mcg/actuation inhaler Inhale 2 puffs into the lungs every 4 (four) hours as needed for Wheezing or Shortness of Breath (cough). 18 g 11    clopidogreL (PLAVIX) 75 mg tablet Take 1 tablet by mouth every morning.      levocetirizine (XYZAL) 5 MG tablet Take 1 tablet (5 mg total) by mouth every evening. 30 tablet 11    predniSONE (DELTASONE) 20 MG tablet Take 0.5 tablets (10 mg total) by mouth 2 (two) times daily. 10 tablet 1    ticagrelor (BRILINTA) 90 mg tablet Take 90 mg by mouth.      [DISCONTINUED] predniSONE (DELTASONE) 20 MG tablet Take 0.5 tablets (10 mg total) by mouth 2 (two) times daily. 10 tablet 0     No facility-administered encounter medications on file as of 2/18/2025.       Objective:     Vital Signs (Most Recent)  Vital Signs  Pulse: 79  Resp: 18  SpO2: (!) 93 % (3 liters)  BP: 128/72  Patient Position: Sitting  Height and Weight  Height: 5' 11" (180.3 cm)  Weight: 97.9 kg (215 lb 13.3 oz)  BSA (Calculated - sq m): 2.21 sq meters  BMI (Calculated): 30.1  Weight in (lb) to have BMI = 25: 178.9]  Wt Readings from Last 2 Encounters:   02/18/25 97.9 kg (215 lb 13.3 oz)   09/13/24 95.2 kg (209 lb 14.4 oz)       Physical Exam   Constitutional: He is oriented to person, place, and time. He appears well-developed and well-nourished.   Cardiovascular: Normal rate.   Pulmonary/Chest: Normal expansion and effort normal. He has decreased breath sounds.   Neurological: He is alert and oriented to person, place, and time.   Psychiatric: His behavior is normal. " "      Laboratory  Lab Results   Component Value Date    WBC 10.0 03/18/2024    RBC 4.67 05/07/2017    HGB 14.3 03/18/2024    HCT 45.5 03/18/2024    MCV 88 05/07/2017    MCH 27.7 03/18/2024    MCHC 34.1 05/07/2017    RDW 14.7 03/18/2024     03/18/2024    MPV 9.8 05/07/2017    GRAN 11.3 (H) 05/07/2017    GRAN 90.2 (H) 05/07/2017    LYMPH 0.8 (L) 05/07/2017    LYMPH 6.0 (L) 05/07/2017    MONO 0.5 05/07/2017    MONO 3.6 (L) 05/07/2017    EOS 0.8 (H) 03/18/2024    BASO 0.1 03/18/2024    EOSINOPHIL 8 03/18/2024    BASOPHIL 0.1 05/07/2017       BMP  Lab Results   Component Value Date     05/07/2017    K 4.7 05/07/2017     05/07/2017    CO2 22 (L) 05/07/2017    BUN 29 (H) 05/07/2017    CREATININE 1.0 05/07/2017    CALCIUM 8.8 05/07/2017    ANIONGAP 8 05/07/2017    ESTGFRAFRICA >60 05/07/2017    EGFRNONAA >60 05/07/2017    AST 24 05/05/2017    ALT 28 05/05/2017    PROT 7.4 05/05/2017       Lab Results   Component Value Date     (H) 07/17/2024     (H) 09/06/2023     (H) 09/02/2023    BNP 59 01/04/2022    BNP 36 05/05/2017       Lab Results   Component Value Date    TSH 1.222 09/02/2023       Lab Results   Component Value Date    SEDRATE 9 07/17/2024       Lab Results   Component Value Date    CRP 1.6 07/17/2024     No results found for: "IGE"     No results found for: "ASPERGILLUS"  No results found for: "AFUMIGATUSCL"     Lab Results   Component Value Date    ACE 36 07/17/2024        Diagnostic Results:    I have personally reviewed today the following studies:    CT chest without contrast9/13/2024    COMPARISON:  02/28/2024, 05/05/2017     FINDINGS:  Base of Neck: No significant abnormality.     Thoracic soft tissues: Normal.     Aorta: Left-sided aortic arch.  No aneurysm.     Heart: Normal size. No effusion. Severe aortic and coronary artery atherosclerotic calcification.     Pulmonary vasculature: Pulmonary arteries distribute normally.     Greta/Mediastinum: No pathologic krzysztof " enlargement.     Esophagus: Normal.     Upper Abdomen: No abnormality of the partially imaged upper abdomen.     Airways: Patent.     Lungs/Pleura: Markedly severe upper lobe predominant centrilobular emphysema.  Unchanged benign calcified right apical pleuroparenchymal scarring.  Benign right upper lobe calcified granuloma.  Unchanged posterior left apical pleuroparenchymal scarring.  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  Findings are new compared to 2017.     No suspicious pulmonary nodules.     Bones: No acute fracture. No suspicious lytic or sclerotic lesions.     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 Findings are new compared to 2017.     Markedly severe upper lobe predominant centrilobular emphysema.        CT ANGIOGRAM PULMONARY 9/2023      No central pulmonary embolus however contrast bolus timing is suboptimal with gradual loss of contrast into the more distal branches of all lobar pulmonary arteries. No convincing evidence of pulmonary embolus.     Emphysema with stable partially calcified right upper lobe nodule consistent with granuloma or hamartoma.     Biapical fibrotic changes with some new associated calcification at the right apex. New mild subpleural fibrotic changes in both lower lobes.     Bilateral nonobstructing renal stones.     Cholelithiasis           Assessment/Plan:   Moderate COPD (chronic obstructive pulmonary disease)  -     albuterol (VENTOLIN HFA) 90 mcg/actuation inhaler; Inhale 2 puffs into the lungs every 4 (four) hours as needed for Wheezing or Shortness of Breath (cough).  Dispense: 18 g; Refill: 11  -     albuterol (PROVENTIL) 2.5 mg /3 mL (0.083 %) nebulizer solution; Take 3 mLs (2.5 mg total) by nebulization every 4 (four) hours as  needed for Wheezing or Shortness of Breath.  Dispense: 360 mL; Refill: 11  -     levocetirizine (XYZAL) 5 MG tablet; Take 1 tablet (5 mg total) by mouth every evening.  Dispense: 30 tablet; Refill: 11  -     X-Ray Chest PA And Lateral; Future; Expected date: 02/18/2025  -     X-Ray Chest PA And Lateral; Future; Expected date: 02/18/2025    Centrilobular emphysema  -     albuterol (PROVENTIL) 2.5 mg /3 mL (0.083 %) nebulizer solution; Take 3 mLs (2.5 mg total) by nebulization every 4 (four) hours as needed for Wheezing or Shortness of Breath.  Dispense: 360 mL; Refill: 11  -     levocetirizine (XYZAL) 5 MG tablet; Take 1 tablet (5 mg total) by mouth every evening.  Dispense: 30 tablet; Refill: 11  -     X-Ray Chest PA And Lateral; Future; Expected date: 02/18/2025    COPD, moderate  -     albuterol (PROVENTIL) 2.5 mg /3 mL (0.083 %) nebulizer solution; Take 3 mLs (2.5 mg total) by nebulization every 4 (four) hours as needed for Wheezing or Shortness of Breath.  Dispense: 360 mL; Refill: 11  -     levocetirizine (XYZAL) 5 MG tablet; Take 1 tablet (5 mg total) by mouth every evening.  Dispense: 30 tablet; Refill: 11  -     X-Ray Chest PA And Lateral; Future; Expected date: 02/18/2025    COPD with acute exacerbation  -     albuterol (VENTOLIN HFA) 90 mcg/actuation inhaler; Inhale 2 puffs into the lungs every 4 (four) hours as needed for Wheezing or Shortness of Breath (cough).  Dispense: 18 g; Refill: 11  -     albuterol (PROVENTIL) 2.5 mg /3 mL (0.083 %) nebulizer solution; Take 3 mLs (2.5 mg total) by nebulization every 4 (four) hours as needed for Wheezing or Shortness of Breath.  Dispense: 360 mL; Refill: 11  -     Discontinue: predniSONE (DELTASONE) 20 MG tablet; Take 0.5 tablets (10 mg total) by mouth 2 (two) times daily.  Dispense: 10 tablet; Refill: 0  -     X-Ray Chest PA And Lateral; Future; Expected date: 02/18/2025  -     X-Ray Chest PA And Lateral; Future; Expected date: 02/18/2025  -     predniSONE  (DELTASONE) 20 MG tablet; Take 0.5 tablets (10 mg total) by mouth 2 (two) times daily.  Dispense: 10 tablet; Refill: 1          Continue albuterol continue Trelegy Ellipta.    Not agreeable for Prevnar 20 or pulmonary rehab.  Chest X Ray today  Short course prednisone for exacerbation, he declines antibiotics today, will add xyzal as exacerbation seems to be triggered by outside allergens  Keep follow up as scheduled 3/14  ER for worsening symptoms    Diabetic precautions, Risk and benefits of steroid therapy in diabetic patient were reviewed, patient is cautioned to monitor blood sugars and discontinue steroid therapy for any elevations above 300.     Discussed diagnosis, its evaluation, treatment and usual course. All questions answered.    Patient verbalized understanding of plan and left in no acute distress    Thank you for the courtesy of participating in the care of this patient    [facility]SE [facility]

## 2025-02-26 ENCOUNTER — HOSPITAL ENCOUNTER (INPATIENT)
Facility: HOSPITAL | Age: 85
LOS: 2 days | Discharge: HOME | DRG: 193 | End: 2025-02-28
Attending: EMERGENCY MEDICINE | Admitting: INTERNAL MEDICINE
Payer: MEDICARE

## 2025-02-26 DIAGNOSIS — J84.9 ILD (INTERSTITIAL LUNG DISEASE): ICD-10-CM

## 2025-02-26 DIAGNOSIS — R79.89 ELEVATED BRAIN NATRIURETIC PEPTIDE (BNP) LEVEL: ICD-10-CM

## 2025-02-26 DIAGNOSIS — J44.1 ACUTE EXACERBATION OF CHRONIC OBSTRUCTIVE PULMONARY DISEASE (COPD): ICD-10-CM

## 2025-02-26 DIAGNOSIS — J18.9 PNEUMONIA OF RIGHT LUNG DUE TO INFECTIOUS ORGANISM, UNSPECIFIED PART OF LUNG: ICD-10-CM

## 2025-02-26 DIAGNOSIS — J84.9 INTERSTITIAL PULMONARY DISEASE, UNSPECIFIED: ICD-10-CM

## 2025-02-26 DIAGNOSIS — R06.02 SHORTNESS OF BREATH: ICD-10-CM

## 2025-02-26 DIAGNOSIS — J18.9 PNEUMONIA OF RIGHT LOWER LOBE DUE TO INFECTIOUS ORGANISM: Primary | ICD-10-CM

## 2025-02-26 DIAGNOSIS — R07.9 CHEST PAIN: ICD-10-CM

## 2025-02-26 DIAGNOSIS — J96.21 ACUTE ON CHRONIC HYPOXIC RESPIRATORY FAILURE: ICD-10-CM

## 2025-02-26 LAB
ALBUMIN SERPL BCP-MCNC: 3.6 G/DL (ref 3.5–5.2)
ALLENS TEST: ABNORMAL
ALP SERPL-CCNC: 107 U/L (ref 40–150)
ALT SERPL W/O P-5'-P-CCNC: 15 U/L (ref 10–44)
ANION GAP SERPL CALC-SCNC: 12 MMOL/L (ref 8–16)
APTT PPP: 21.7 SEC (ref 21–32)
AST SERPL-CCNC: 13 U/L (ref 10–40)
BASOPHILS # BLD AUTO: 0.07 K/UL (ref 0–0.2)
BASOPHILS NFR BLD: 0.5 % (ref 0–1.9)
BILIRUB SERPL-MCNC: 1 MG/DL (ref 0.1–1)
BNP SERPL-MCNC: 659 PG/ML (ref 0–99)
BUN SERPL-MCNC: 15 MG/DL (ref 8–23)
CALCIUM SERPL-MCNC: 8.7 MG/DL (ref 8.7–10.5)
CHLORIDE SERPL-SCNC: 106 MMOL/L (ref 95–110)
CO2 SERPL-SCNC: 26 MMOL/L (ref 23–29)
CREAT SERPL-MCNC: 0.9 MG/DL (ref 0.5–1.4)
DELSYS: ABNORMAL
DIFFERENTIAL METHOD BLD: ABNORMAL
EOSINOPHIL # BLD AUTO: 0.6 K/UL (ref 0–0.5)
EOSINOPHIL NFR BLD: 4.1 % (ref 0–8)
ERYTHROCYTE [DISTWIDTH] IN BLOOD BY AUTOMATED COUNT: 14.7 % (ref 11.5–14.5)
EST. GFR  (NO RACE VARIABLE): >60 ML/MIN/1.73 M^2
FLOW: 4
GLUCOSE SERPL-MCNC: 179 MG/DL (ref 70–110)
HCO3 UR-SCNC: 25.6 MMOL/L (ref 24–28)
HCT VFR BLD AUTO: 43.7 % (ref 40–54)
HEP C VIRUS HOLD SPECIMEN: NORMAL
HGB BLD-MCNC: 13.9 G/DL (ref 14–18)
IMM GRANULOCYTES # BLD AUTO: 0.1 K/UL (ref 0–0.04)
IMM GRANULOCYTES NFR BLD AUTO: 0.7 % (ref 0–0.5)
INFLUENZA A, MOLECULAR: NEGATIVE
INFLUENZA B, MOLECULAR: NEGATIVE
INR PPP: 1 (ref 0.8–1.2)
LACTATE SERPL-SCNC: 3.1 MMOL/L (ref 0.5–2.2)
LYMPHOCYTES # BLD AUTO: 1.8 K/UL (ref 1–4.8)
LYMPHOCYTES NFR BLD: 12.9 % (ref 18–48)
MCH RBC QN AUTO: 29.4 PG (ref 27–31)
MCHC RBC AUTO-ENTMCNC: 31.8 G/DL (ref 32–36)
MCV RBC AUTO: 93 FL (ref 82–98)
MODE: ABNORMAL
MONOCYTES # BLD AUTO: 0.8 K/UL (ref 0.3–1)
MONOCYTES NFR BLD: 5.6 % (ref 4–15)
NEUTROPHILS # BLD AUTO: 10.8 K/UL (ref 1.8–7.7)
NEUTROPHILS NFR BLD: 76.2 % (ref 38–73)
NRBC BLD-RTO: 0 /100 WBC
PCO2 BLDA: 40.7 MMHG (ref 35–45)
PH SMN: 7.41 [PH] (ref 7.35–7.45)
PLATELET # BLD AUTO: 211 K/UL (ref 150–450)
PMV BLD AUTO: 9.8 FL (ref 9.2–12.9)
PO2 BLDA: 51 MMHG (ref 80–100)
POC BE: 1 MMOL/L
POC SATURATED O2: 86 % (ref 95–100)
POCT GLUCOSE: 355 MG/DL (ref 70–110)
POTASSIUM SERPL-SCNC: 3.7 MMOL/L (ref 3.5–5.1)
PROCALCITONIN SERPL IA-MCNC: 0.03 NG/ML
PROT SERPL-MCNC: 6.3 G/DL (ref 6–8.4)
PROTHROMBIN TIME: 11.4 SEC (ref 9–12.5)
RBC # BLD AUTO: 4.72 M/UL (ref 4.6–6.2)
SAMPLE: ABNORMAL
SARS-COV-2 RDRP RESP QL NAA+PROBE: NEGATIVE
SITE: ABNORMAL
SODIUM SERPL-SCNC: 144 MMOL/L (ref 136–145)
SPECIMEN SOURCE: NORMAL
TROPONIN I SERPL DL<=0.01 NG/ML-MCNC: <0.006 NG/ML (ref 0–0.03)
WBC # BLD AUTO: 14.17 K/UL (ref 3.9–12.7)

## 2025-02-26 PROCEDURE — 87040 BLOOD CULTURE FOR BACTERIA: CPT | Mod: 59 | Performed by: EMERGENCY MEDICINE

## 2025-02-26 PROCEDURE — 93005 ELECTROCARDIOGRAM TRACING: CPT

## 2025-02-26 PROCEDURE — 36600 WITHDRAWAL OF ARTERIAL BLOOD: CPT

## 2025-02-26 PROCEDURE — 94640 AIRWAY INHALATION TREATMENT: CPT | Mod: XB

## 2025-02-26 PROCEDURE — 87635 SARS-COV-2 COVID-19 AMP PRB: CPT | Performed by: EMERGENCY MEDICINE

## 2025-02-26 PROCEDURE — 84484 ASSAY OF TROPONIN QUANT: CPT | Performed by: EMERGENCY MEDICINE

## 2025-02-26 PROCEDURE — 36415 COLL VENOUS BLD VENIPUNCTURE: CPT | Performed by: EMERGENCY MEDICINE

## 2025-02-26 PROCEDURE — 25000003 PHARM REV CODE 250

## 2025-02-26 PROCEDURE — 25000242 PHARM REV CODE 250 ALT 637 W/ HCPCS

## 2025-02-26 PROCEDURE — 99285 EMERGENCY DEPT VISIT HI MDM: CPT | Mod: 25

## 2025-02-26 PROCEDURE — 25000003 PHARM REV CODE 250: Performed by: STUDENT IN AN ORGANIZED HEALTH CARE EDUCATION/TRAINING PROGRAM

## 2025-02-26 PROCEDURE — 96365 THER/PROPH/DIAG IV INF INIT: CPT

## 2025-02-26 PROCEDURE — 96375 TX/PRO/DX INJ NEW DRUG ADDON: CPT

## 2025-02-26 PROCEDURE — 85730 THROMBOPLASTIN TIME PARTIAL: CPT | Performed by: EMERGENCY MEDICINE

## 2025-02-26 PROCEDURE — 94799 UNLISTED PULMONARY SVC/PX: CPT

## 2025-02-26 PROCEDURE — 63600175 PHARM REV CODE 636 W HCPCS: Mod: JZ,TB

## 2025-02-26 PROCEDURE — 85025 COMPLETE CBC W/AUTO DIFF WBC: CPT | Performed by: EMERGENCY MEDICINE

## 2025-02-26 PROCEDURE — 83605 ASSAY OF LACTIC ACID: CPT | Performed by: EMERGENCY MEDICINE

## 2025-02-26 PROCEDURE — 87502 INFLUENZA DNA AMP PROBE: CPT | Performed by: EMERGENCY MEDICINE

## 2025-02-26 PROCEDURE — 93010 ELECTROCARDIOGRAM REPORT: CPT | Mod: ,,, | Performed by: INTERNAL MEDICINE

## 2025-02-26 PROCEDURE — 84145 PROCALCITONIN (PCT): CPT | Performed by: EMERGENCY MEDICINE

## 2025-02-26 PROCEDURE — 83036 HEMOGLOBIN GLYCOSYLATED A1C: CPT | Performed by: STUDENT IN AN ORGANIZED HEALTH CARE EDUCATION/TRAINING PROGRAM

## 2025-02-26 PROCEDURE — 99900035 HC TECH TIME PER 15 MIN (STAT)

## 2025-02-26 PROCEDURE — 25000242 PHARM REV CODE 250 ALT 637 W/ HCPCS: Performed by: EMERGENCY MEDICINE

## 2025-02-26 PROCEDURE — 94761 N-INVAS EAR/PLS OXIMETRY MLT: CPT | Mod: XB

## 2025-02-26 PROCEDURE — 85610 PROTHROMBIN TIME: CPT | Performed by: EMERGENCY MEDICINE

## 2025-02-26 PROCEDURE — 27000221 HC OXYGEN, UP TO 24 HOURS

## 2025-02-26 PROCEDURE — 63600175 PHARM REV CODE 636 W HCPCS: Performed by: EMERGENCY MEDICINE

## 2025-02-26 PROCEDURE — 87205 SMEAR GRAM STAIN: CPT | Performed by: EMERGENCY MEDICINE

## 2025-02-26 PROCEDURE — 63600175 PHARM REV CODE 636 W HCPCS: Performed by: INTERNAL MEDICINE

## 2025-02-26 PROCEDURE — 83880 ASSAY OF NATRIURETIC PEPTIDE: CPT | Performed by: EMERGENCY MEDICINE

## 2025-02-26 PROCEDURE — 94640 AIRWAY INHALATION TREATMENT: CPT

## 2025-02-26 PROCEDURE — 25000003 PHARM REV CODE 250: Performed by: EMERGENCY MEDICINE

## 2025-02-26 PROCEDURE — 80053 COMPREHEN METABOLIC PANEL: CPT | Performed by: EMERGENCY MEDICINE

## 2025-02-26 PROCEDURE — 82803 BLOOD GASES ANY COMBINATION: CPT

## 2025-02-26 PROCEDURE — 87070 CULTURE OTHR SPECIMN AEROBIC: CPT | Performed by: EMERGENCY MEDICINE

## 2025-02-26 PROCEDURE — 21400001 HC TELEMETRY ROOM

## 2025-02-26 RX ORDER — IPRATROPIUM BROMIDE AND ALBUTEROL SULFATE 2.5; .5 MG/3ML; MG/3ML
3 SOLUTION RESPIRATORY (INHALATION)
Status: COMPLETED | OUTPATIENT
Start: 2025-02-26 | End: 2025-02-26

## 2025-02-26 RX ORDER — GLUCAGON 1 MG
1 KIT INJECTION
Status: DISCONTINUED | OUTPATIENT
Start: 2025-02-26 | End: 2025-02-26

## 2025-02-26 RX ORDER — SIMETHICONE 80 MG
1 TABLET,CHEWABLE ORAL 4 TIMES DAILY PRN
Status: DISCONTINUED | OUTPATIENT
Start: 2025-02-26 | End: 2025-02-28 | Stop reason: HOSPADM

## 2025-02-26 RX ORDER — SODIUM CHLORIDE 0.9 % (FLUSH) 0.9 %
3 SYRINGE (ML) INJECTION EVERY 12 HOURS PRN
Status: DISCONTINUED | OUTPATIENT
Start: 2025-02-26 | End: 2025-02-28 | Stop reason: HOSPADM

## 2025-02-26 RX ORDER — ACETAMINOPHEN 325 MG/1
650 TABLET ORAL EVERY 8 HOURS PRN
Status: DISCONTINUED | OUTPATIENT
Start: 2025-02-26 | End: 2025-02-27

## 2025-02-26 RX ORDER — TAMSULOSIN HYDROCHLORIDE 0.4 MG/1
1 CAPSULE ORAL NIGHTLY
Status: DISCONTINUED | OUTPATIENT
Start: 2025-02-26 | End: 2025-02-28 | Stop reason: HOSPADM

## 2025-02-26 RX ORDER — GLUCAGON 1 MG
1 KIT INJECTION
Status: DISCONTINUED | OUTPATIENT
Start: 2025-02-26 | End: 2025-02-28 | Stop reason: HOSPADM

## 2025-02-26 RX ORDER — FLUTICASONE PROPIONATE 50 MCG
1 SPRAY, SUSPENSION (ML) NASAL DAILY
Status: DISCONTINUED | OUTPATIENT
Start: 2025-02-27 | End: 2025-02-28 | Stop reason: HOSPADM

## 2025-02-26 RX ORDER — TALC
6 POWDER (GRAM) TOPICAL NIGHTLY PRN
Status: DISCONTINUED | OUTPATIENT
Start: 2025-02-26 | End: 2025-02-28 | Stop reason: HOSPADM

## 2025-02-26 RX ORDER — NAPROXEN SODIUM 220 MG/1
81 TABLET, FILM COATED ORAL DAILY
Status: DISCONTINUED | OUTPATIENT
Start: 2025-02-26 | End: 2025-02-28 | Stop reason: HOSPADM

## 2025-02-26 RX ORDER — CLOPIDOGREL BISULFATE 75 MG/1
75 TABLET ORAL EVERY MORNING
Status: DISCONTINUED | OUTPATIENT
Start: 2025-02-27 | End: 2025-02-28 | Stop reason: HOSPADM

## 2025-02-26 RX ORDER — ONDANSETRON HYDROCHLORIDE 2 MG/ML
4 INJECTION, SOLUTION INTRAVENOUS EVERY 8 HOURS PRN
Status: DISCONTINUED | OUTPATIENT
Start: 2025-02-26 | End: 2025-02-28 | Stop reason: HOSPADM

## 2025-02-26 RX ORDER — IBUPROFEN 200 MG
24 TABLET ORAL
Status: DISCONTINUED | OUTPATIENT
Start: 2025-02-26 | End: 2025-02-28 | Stop reason: HOSPADM

## 2025-02-26 RX ORDER — IBUPROFEN 200 MG
16 TABLET ORAL
Status: DISCONTINUED | OUTPATIENT
Start: 2025-02-26 | End: 2025-02-26

## 2025-02-26 RX ORDER — INSULIN ASPART 100 [IU]/ML
0-10 INJECTION, SOLUTION INTRAVENOUS; SUBCUTANEOUS
Status: DISCONTINUED | OUTPATIENT
Start: 2025-02-26 | End: 2025-02-27

## 2025-02-26 RX ORDER — METHYLPREDNISOLONE SOD SUCC 125 MG
125 VIAL (EA) INJECTION
Status: DISCONTINUED | OUTPATIENT
Start: 2025-02-26 | End: 2025-02-26

## 2025-02-26 RX ORDER — CEFTRIAXONE 2 G/1
2 INJECTION, POWDER, FOR SOLUTION INTRAMUSCULAR; INTRAVENOUS
Status: COMPLETED | OUTPATIENT
Start: 2025-02-26 | End: 2025-02-26

## 2025-02-26 RX ORDER — CEFTRIAXONE 2 G/1
2 INJECTION, POWDER, FOR SOLUTION INTRAMUSCULAR; INTRAVENOUS DAILY
Status: DISCONTINUED | OUTPATIENT
Start: 2025-02-27 | End: 2025-02-28 | Stop reason: HOSPADM

## 2025-02-26 RX ORDER — ARFORMOTEROL TARTRATE 15 UG/2ML
15 SOLUTION RESPIRATORY (INHALATION) 2 TIMES DAILY
Status: DISCONTINUED | OUTPATIENT
Start: 2025-02-26 | End: 2025-02-28 | Stop reason: HOSPADM

## 2025-02-26 RX ORDER — FUROSEMIDE 10 MG/ML
40 INJECTION INTRAMUSCULAR; INTRAVENOUS EVERY 12 HOURS
Status: DISCONTINUED | OUTPATIENT
Start: 2025-02-26 | End: 2025-02-27

## 2025-02-26 RX ORDER — LOSARTAN POTASSIUM 50 MG/1
50 TABLET ORAL DAILY
Status: DISCONTINUED | OUTPATIENT
Start: 2025-02-27 | End: 2025-02-28 | Stop reason: HOSPADM

## 2025-02-26 RX ORDER — ATORVASTATIN CALCIUM 40 MG/1
40 TABLET, FILM COATED ORAL DAILY
Status: DISCONTINUED | OUTPATIENT
Start: 2025-02-26 | End: 2025-02-28 | Stop reason: HOSPADM

## 2025-02-26 RX ORDER — TRAZODONE HYDROCHLORIDE 100 MG/1
100 TABLET ORAL NIGHTLY
Status: DISCONTINUED | OUTPATIENT
Start: 2025-02-26 | End: 2025-02-28 | Stop reason: HOSPADM

## 2025-02-26 RX ORDER — BUDESONIDE 0.5 MG/2ML
0.5 INHALANT ORAL EVERY 12 HOURS
Status: DISCONTINUED | OUTPATIENT
Start: 2025-02-26 | End: 2025-02-28 | Stop reason: HOSPADM

## 2025-02-26 RX ORDER — NALOXONE HCL 0.4 MG/ML
0.02 VIAL (ML) INJECTION
Status: DISCONTINUED | OUTPATIENT
Start: 2025-02-26 | End: 2025-02-28 | Stop reason: HOSPADM

## 2025-02-26 RX ORDER — INSULIN ASPART 100 [IU]/ML
0-5 INJECTION, SOLUTION INTRAVENOUS; SUBCUTANEOUS
Status: DISCONTINUED | OUTPATIENT
Start: 2025-02-26 | End: 2025-02-26

## 2025-02-26 RX ORDER — IBUPROFEN 200 MG
24 TABLET ORAL
Status: DISCONTINUED | OUTPATIENT
Start: 2025-02-26 | End: 2025-02-26

## 2025-02-26 RX ORDER — POLYETHYLENE GLYCOL 3350 17 G/17G
17 POWDER, FOR SOLUTION ORAL DAILY
Status: DISCONTINUED | OUTPATIENT
Start: 2025-02-27 | End: 2025-02-28 | Stop reason: HOSPADM

## 2025-02-26 RX ORDER — ENOXAPARIN SODIUM 100 MG/ML
40 INJECTION SUBCUTANEOUS EVERY 24 HOURS
Status: DISCONTINUED | OUTPATIENT
Start: 2025-02-26 | End: 2025-02-28 | Stop reason: HOSPADM

## 2025-02-26 RX ORDER — ESCITALOPRAM OXALATE 10 MG/1
20 TABLET ORAL EVERY MORNING
Status: DISCONTINUED | OUTPATIENT
Start: 2025-02-27 | End: 2025-02-28 | Stop reason: HOSPADM

## 2025-02-26 RX ORDER — PANTOPRAZOLE SODIUM 40 MG/1
40 TABLET, DELAYED RELEASE ORAL DAILY
Status: DISCONTINUED | OUTPATIENT
Start: 2025-02-27 | End: 2025-02-28 | Stop reason: HOSPADM

## 2025-02-26 RX ORDER — ACETAMINOPHEN 325 MG/1
650 TABLET ORAL EVERY 4 HOURS PRN
Status: DISCONTINUED | OUTPATIENT
Start: 2025-02-26 | End: 2025-02-27

## 2025-02-26 RX ORDER — IPRATROPIUM BROMIDE 42 UG/1
2 SPRAY, METERED NASAL 2 TIMES DAILY
Status: DISCONTINUED | OUTPATIENT
Start: 2025-02-26 | End: 2025-02-28 | Stop reason: HOSPADM

## 2025-02-26 RX ORDER — IBUPROFEN 200 MG
16 TABLET ORAL
Status: DISCONTINUED | OUTPATIENT
Start: 2025-02-26 | End: 2025-02-27

## 2025-02-26 RX ORDER — METOPROLOL SUCCINATE 25 MG/1
25 TABLET, EXTENDED RELEASE ORAL 2 TIMES DAILY
Status: DISCONTINUED | OUTPATIENT
Start: 2025-02-26 | End: 2025-02-28 | Stop reason: HOSPADM

## 2025-02-26 RX ADMIN — DOXYCYCLINE 100 MG: 100 INJECTION, POWDER, LYOPHILIZED, FOR SOLUTION INTRAVENOUS at 12:02

## 2025-02-26 RX ADMIN — TAMSULOSIN HYDROCHLORIDE 0.4 MG: 0.4 CAPSULE ORAL at 09:02

## 2025-02-26 RX ADMIN — METHYLPREDNISOLONE SODIUM SUCCINATE 60 MG: 40 INJECTION, POWDER, FOR SOLUTION INTRAMUSCULAR; INTRAVENOUS at 09:02

## 2025-02-26 RX ADMIN — ENOXAPARIN SODIUM 40 MG: 40 INJECTION SUBCUTANEOUS at 09:02

## 2025-02-26 RX ADMIN — IPRATROPIUM BROMIDE AND ALBUTEROL SULFATE 3 ML: 2.5; .5 SOLUTION RESPIRATORY (INHALATION) at 12:02

## 2025-02-26 RX ADMIN — ATORVASTATIN CALCIUM 40 MG: 40 TABLET, FILM COATED ORAL at 03:02

## 2025-02-26 RX ADMIN — BUDESONIDE INHALATION 0.5 MG: 0.5 SUSPENSION RESPIRATORY (INHALATION) at 07:02

## 2025-02-26 RX ADMIN — ASPIRIN 81 MG CHEWABLE TABLET 81 MG: 81 TABLET CHEWABLE at 03:02

## 2025-02-26 RX ADMIN — FUROSEMIDE 40 MG: 10 INJECTION, SOLUTION INTRAMUSCULAR; INTRAVENOUS at 09:02

## 2025-02-26 RX ADMIN — ARFORMOTEROL TARTRATE 15 MCG: 15 SOLUTION RESPIRATORY (INHALATION) at 07:02

## 2025-02-26 RX ADMIN — METOPROLOL SUCCINATE 25 MG: 25 TABLET, EXTENDED RELEASE ORAL at 09:02

## 2025-02-26 RX ADMIN — INSULIN ASPART 5 UNITS: 100 INJECTION, SOLUTION INTRAVENOUS; SUBCUTANEOUS at 09:02

## 2025-02-26 RX ADMIN — TRAZODONE HYDROCHLORIDE 100 MG: 100 TABLET ORAL at 11:02

## 2025-02-26 RX ADMIN — CEFTRIAXONE SODIUM 2 G: 2 INJECTION, POWDER, FOR SOLUTION INTRAMUSCULAR; INTRAVENOUS at 12:02

## 2025-02-26 RX ADMIN — IPRATROPIUM BROMIDE 2 SPRAY: 42 SPRAY, METERED NASAL at 09:02

## 2025-02-26 NOTE — PHARMACY MED REC
"Admission Medication History     The home medication history was taken by Bar Segovia.    You may go to "Admission" then "Reconcile Home Medications" tabs to review and/or act upon these items.     The home medication list has been updated by the Pharmacy department.   Please read ALL comments highlighted in yellow.   Please address this information as you see fit.    Feel free to contact us if you have any questions or require assistance.      The medications listed below were removed from the home medication list. Please reorder if appropriate:  Patient reports no longer taking the following medication(s):  ALPHAGAN 0.2%  CHLOR-TRIMETON 4MG  RESTASIS 0.05%  TRUSOPT 2%  JARDIANCE 10MG  PRED FORTE 1%  ZOCOR 10MG  BRINLINTA 90MG      Medications listed below were obtained from: Patient/family and Analytic software- [employer]  (Not in a hospital admission)        Bar Segovia  [id number]    Current Outpatient Medications on File Prior to Encounter   Medication Sig Dispense Refill Last Dose/Taking    aspirin 81 MG Chew Take 81 mg by mouth once daily.   2/25/2025    atorvastatin (LIPITOR) 40 MG tablet Take 40 mg by mouth once daily.   2/25/2025    clopidogreL (PLAVIX) 75 mg tablet Take 1 tablet by mouth every morning.   Taking    EScitalopram oxalate (LEXAPRO) 20 MG tablet Take 1 tablet by mouth every morning.   2/25/2025    levocetirizine (XYZAL) 5 MG tablet Take 1 tablet (5 mg total) by mouth every evening. 30 tablet 11 2/25/2025    losartan (COZAAR) 50 MG tablet Take 1 tablet by mouth once daily.   2/25/2025    metformin (GLUCOPHAGE) 500 MG tablet Take 500 mg by mouth every evening.   2/25/2025    metoprolol succinate (TOPROL-XL) 25 MG 24 hr tablet Take 25 mg by mouth 2 (two) times daily.   2/25/2025    pantoprazole (PROTONIX) 40 MG tablet Take 1 tablet by mouth once daily.   2/25/2025    predniSONE (DELTASONE) 20 MG tablet Take 0.5 tablets (10 mg total) by mouth 2 (two) times daily. (Patient taking " differently: Take 10 mg by mouth once daily.) 10 tablet 1 2/25/2025    tamsulosin (FLOMAX) 0.4 mg Cap Take 1 capsule by mouth every evening.   2/25/2025    traZODone (DESYREL) 50 MG tablet Take 100 mg by mouth every evening.   2/25/2025    TRELEGY ELLIPTA 200-62.5-25 mcg inhaler INHALE 1 PUFF BY MOUTH INTO THE LUNGS ONCE DAILY. 180 each 3 2/25/2025    albuterol (PROVENTIL) 2.5 mg /3 mL (0.083 %) nebulizer solution Take 3 mLs (2.5 mg total) by nebulization every 4 (four) hours as needed for Wheezing or Shortness of Breath. 360 mL 11     albuterol (VENTOLIN HFA) 90 mcg/actuation inhaler Inhale 2 puffs into the lungs every 4 (four) hours as needed for Wheezing or Shortness of Breath (cough). 18 g 11     fluticasone (FLONASE) 50 mcg/actuation nasal spray 1 spray by Each Nare route once daily.       ipratropium (ATROVENT) 42 mcg (0.06 %) nasal spray 2 sprays by Each Nostril route 2 (two) times daily. 15 mL 11     mupirocin (BACTROBAN) 2 % ointment Apply topically 3 (three) times daily.       sildenafiL (VIAGRA) 100 MG tablet Take 100 mg by mouth as needed.                            .

## 2025-02-26 NOTE — ED PROVIDER NOTES
SCRIBE #1 NOTE: I, Karl Torres, am scribing for, and in the presence of, Yaneth Wagner MD. I have scribed the entire note.       History     Chief Complaint   Patient presents with    Shortness of Breath     [facility] reports SOB x 2 weeks that has progressively gotten worse. RA sats 80's on EMS arrival.      Review of patient's allergies indicates:   Allergen Reactions    Amoxicillin-pot clavulanate Hives    Baclofen Other (See Comments)     Hallucinations          History of Present Illness     HPI    2/26/2025, 12:23 PM  History obtained from the patient and medical records      History of Present Illness: Stone Reveles is a 84 y.o. male patient with a PMHx of interstitial lung disease, DM, COPD, HTN, and chronic respiratory failure with hypoxia who presents to the Emergency Department for evaluation of worsening dyspnea over the past few weeks. Pt initially was seen by pulmonary on the 18th for the same complaint where he was prescribed prednisone. Pt states the sxs are not improving. Symptoms are constant and moderate in severity. No mitigating or exacerbating factors reported. Associated sxs include chills and productive cough (blood tinged sputum). Patient denies any fever. Prior Tx includes 125mg Solumedrol by EMS PTA.  No further complaints or concerns at this time.       Arrival mode: Ambulance Service    PCP: Nessa Thorpe MD        Past Medical History:  Past Medical History:   Diagnosis Date    Chronic hypertension     Chronic respiratory failure with hypoxia, on home O2 therapy     COPD, very severe     Diabetes mellitus     ILD (interstitial lung disease)        Past Surgical History:  Past Surgical History:   Procedure Laterality Date    INSERTION OF PACEMAKER           Family History:  Family History   Problem Relation Name Age of Onset    Diabetes Mother      Heart disease Mother      Stroke Father      Alcohol abuse Brother      Alcohol abuse Brother      Pneumonia Brother      Asbestos  Brother      Other (blood disease) Brother         Social History:  Social History     Tobacco Use    Smoking status: Former     Current packs/day: 0.00     Average packs/day: 1 pack/day for 56.0 years (56.0 ttl pk-yrs)     Types: Cigarettes     Start date: 1954     Quit date: 2010     Years since quitting: 15.1    Smokeless tobacco: Current     Types: Snuff   Substance and Sexual Activity    Alcohol use: Never    Drug use: No    Sexual activity: Yes        Review of Systems     Review of Systems   Constitutional:  Positive for chills. Negative for fever.   HENT:  Negative for sore throat.    Respiratory:  Positive for cough (blood tinged sputum) and shortness of breath.    Cardiovascular:  Negative for chest pain.   Gastrointestinal:  Negative for nausea.   Genitourinary:  Negative for dysuria.   Musculoskeletal:  Negative for back pain.   Skin:  Negative for rash.   Neurological:  Negative for weakness.   Hematological:  Does not bruise/bleed easily.   All other systems reviewed and are negative.     Physical Exam     Initial Vitals [02/26/25 1057]   BP Pulse Resp Temp SpO2   (!) 156/90 88 18 98 °F (36.7 °C) (!) 94 %      MAP       --          Physical Exam  Nursing Notes and Vital Signs Reviewed.  Constitutional: Patient is in mild acute distress. Elderly.   Head: Atraumatic. Normocephalic.  Eyes: PERRL. EOM intact. Conjunctivae are not pale. No scleral icterus.  ENT: Mucous membranes are moist. Oropharynx is clear and symmetric. Coughing up blood tinged sputum. Airway patent.   Neck: Supple. Full ROM. No lymphadenopathy.  Cardiovascular: Regular rate. Regular rhythm. No murmurs, rubs, or gallops. Distal pulses are 2+ and symmetric.  Pulmonary/Chest: Tachypnea. Diminished breath sounds bilaterally, crackles at right lung base.   Abdominal: Soft and non-distended.  There is no tenderness.  No rebound, guarding, or rigidity. Good bowel sounds.  Genitourinary: No CVA tenderness  Musculoskeletal: Moves all  "extremities. No obvious deformities. No edema. No calf tenderness.  Skin: Warm and dry.  Neurological:  Alert, awake, and appropriate.  Normal speech.  No acute focal neurological deficits are appreciated.  Psychiatric: Normal affect. Good eye contact. Appropriate in content.     ED Course   Critical Care    Date/Time: 2/26/2025 1:00 PM    Performed by: Yaneth Wagner MD  Authorized by: Yaneth Wagner MD  Direct patient critical care time: 40 minutes  Additional history critical care time: 6 minutes  Ordering / reviewing critical care time: 7 minutes  Documentation critical care time: 7 minutes  Consulting other physicians critical care time: 6 minutes  Total critical care time (exclusive of procedural time) : 66 minutes  Critical care was necessary to treat or prevent imminent or life-threatening deterioration of the following conditions: respiratory failure, cardiac failure and sepsis.  Critical care was time spent personally by me on the following activities: blood draw for specimens, development of treatment plan with patient or surrogate, discussions with consultants, interpretation of cardiac output measurements, evaluation of patient's response to treatment, examination of patient, obtaining history from patient or surrogate, review of old charts, re-evaluation of patient's condition, pulse oximetry, ordering and review of radiographic studies, ordering and review of laboratory studies and ordering and performing treatments and interventions.        ED Vital Signs:  Vitals:    02/27/25 0939 02/27/25 1237 02/27/25 1326 02/27/25 1645   BP: (!) 124/57 (!) 117/56  (!) 150/69   Pulse: 74 78 82 76   Resp:  20 18 18   Temp:  98.1 °F (36.7 °C)  97.5 °F (36.4 °C)   TempSrc:  Oral  Oral   SpO2:  95% 97% 95%   Weight: 96.6 kg (213 lb)      Height: 5' 11" (1.803 m)       02/27/25 1711 02/27/25 1923 02/27/25 1925 02/27/25 2012   BP:    119/62   Pulse: 75 76 73 92   Resp:  18  20   Temp:    97.9 °F (36.6 °C) "   TempSrc:    Oral   SpO2:  96%  (!) 93%   Weight:    95.8 kg (211 lb 3.2 oz)   Height:        02/27/25 2146 02/28/25 0014 02/28/25 0503 02/28/25 0744   BP:  (!) 126/58 123/62    Pulse: 78 75 77 76   Resp:  20 20    Temp:  98.3 °F (36.8 °C) 97.8 °F (36.6 °C)    TempSrc:  Oral Oral    SpO2:  (!) 94% (!) 94%    Weight:       Height:        02/28/25 0801 02/28/25 0830 02/28/25 0850   BP: (!) 130/59     Pulse: 74  73   Resp: 18  20   Temp: 97.4 °F (36.3 °C) (!) 41 °F (5 °C)    TempSrc: Axillary     SpO2: 96%  97%   Weight:      Height:          Abnormal Lab Results:  Labs Reviewed   CBC W/ AUTO DIFFERENTIAL - Abnormal       Result Value    WBC 14.17 (*)     RBC 4.72      Hemoglobin 13.9 (*)     Hematocrit 43.7      MCV 93      MCH 29.4      MCHC 31.8 (*)     RDW 14.7 (*)     Platelets 211      MPV 9.8      Immature Granulocytes 0.7 (*)     Gran # (ANC) 10.8 (*)     Immature Grans (Abs) 0.10 (*)     Lymph # 1.8      Mono # 0.8      Eos # 0.6 (*)     Baso # 0.07      nRBC 0      Gran % 76.2 (*)     Lymph % 12.9 (*)     Mono % 5.6      Eosinophil % 4.1      Basophil % 0.5      Differential Method Automated      Narrative:     Release to patient->Immediate   COMPREHENSIVE METABOLIC PANEL - Abnormal    Sodium 144      Potassium 3.7      Chloride 106      CO2 26      Glucose 179 (*)     BUN 15      Creatinine 0.9      Calcium 8.7      Total Protein 6.3      Albumin 3.6      Total Bilirubin 1.0      Alkaline Phosphatase 107      AST 13      ALT 15      eGFR >60      Anion Gap 12      Narrative:     Release to patient->Immediate   B-TYPE NATRIURETIC PEPTIDE - Abnormal     (*)     Narrative:     Release to patient->Immediate   LACTIC ACID, PLASMA - Abnormal    Lactate (Lactic Acid) 3.1 (*)    ISTAT PROCEDURE - Abnormal    POC PH 7.406      POC PCO2 40.7      POC PO2 51 (*)     POC HCO3 25.6      POC BE 1      POC SATURATED O2 86      Sample ARTERIAL      Site LB      Allens Test Pass      DelSys Nasal Can      Mode SPONT       Flow 4     INFLUENZA A & B BY MOLECULAR    Influenza A, Molecular Negative      Influenza B, Molecular Negative      Flu A & B Source Nasal swab     TROPONIN I    Troponin I <0.006      Narrative:     Release to patient->Immediate   SARS-COV-2 RNA AMPLIFICATION, QUAL    SARS-CoV-2 RNA, Amplification, Qual Negative     PROCALCITONIN    Procalcitonin 0.03      Narrative:     Release to patient->Immediate   PROTIME-INR    Prothrombin Time 11.4      INR 1.0      Narrative:     Release to patient->Immediate   APTT    aPTT 21.7      Narrative:     Release to patient->Immediate   HEP C VIRUS HOLD SPECIMEN    HEP C Virus Hold Specimen Hold for HCV sendout      Narrative:     Release to patient->Immediate   HEPATITIS C ANTIBODY   HIV 1 / 2 ANTIBODY        All Lab Results:  Results for orders placed or performed during the hospital encounter of 02/26/25   EKG 12-lead    Collection Time: 02/26/25 11:23 AM   Result Value Ref Range    QRS Duration 148 ms    [facility] QTC Calculation 499 ms   Influenza A & B by Molecular    Collection Time: 02/26/25 11:27 AM    Specimen: Nasal Swab   Result Value Ref Range    Influenza A, Molecular Negative Negative    Influenza B, Molecular Negative Negative    Flu A & B Source Nasal swab    COVID-19 Rapid Screening    Collection Time: 02/26/25 11:27 AM   Result Value Ref Range    SARS-CoV-2 RNA, Amplification, Qual Negative Negative   Blood Culture #1 **CANNOT BE ORDERED STAT**    Collection Time: 02/26/25 11:33 AM    Specimen: Peripheral, Antecubital, Right; Blood   Result Value Ref Range    Blood Culture, Routine No Growth to date     Blood Culture, Routine No Growth to date     Blood Culture, Routine No Growth to date    CBC auto differential    Collection Time: 02/26/25 11:33 AM   Result Value Ref Range    WBC 14.17 (H) 3.90 - 12.70 K/uL    RBC 4.72 4.60 - 6.20 M/uL    Hemoglobin 13.9 (L) 14.0 - 18.0 g/dL    Hematocrit 43.7 40.0 - 54.0 %    MCV 93 82 - 98 fL    MCH 29.4 27.0 - 31.0 pg    MCHC  31.8 (L) 32.0 - 36.0 g/dL    RDW 14.7 (H) 11.5 - 14.5 %    Platelets 211 150 - 450 K/uL    MPV 9.8 9.2 - 12.9 fL    Immature Granulocytes 0.7 (H) 0.0 - 0.5 %    Gran # (ANC) 10.8 (H) 1.8 - 7.7 K/uL    Immature Grans (Abs) 0.10 (H) 0.00 - 0.04 K/uL    Lymph # 1.8 1.0 - 4.8 K/uL    Mono # 0.8 0.3 - 1.0 K/uL    Eos # 0.6 (H) 0.0 - 0.5 K/uL    Baso # 0.07 0.00 - 0.20 K/uL    nRBC 0 0 /100 WBC    Gran % 76.2 (H) 38.0 - 73.0 %    Lymph % 12.9 (L) 18.0 - 48.0 %    Mono % 5.6 4.0 - 15.0 %    Eosinophil % 4.1 0.0 - 8.0 %    Basophil % 0.5 0.0 - 1.9 %    Differential Method Automated    Comprehensive metabolic panel    Collection Time: 02/26/25 11:33 AM   Result Value Ref Range    Sodium 144 136 - 145 mmol/L    Potassium 3.7 3.5 - 5.1 mmol/L    Chloride 106 95 - 110 mmol/L    CO2 26 23 - 29 mmol/L    Glucose 179 (H) 70 - 110 mg/dL    BUN 15 8 - 23 mg/dL    Creatinine 0.9 0.5 - 1.4 mg/dL    Calcium 8.7 8.7 - 10.5 mg/dL    Total Protein 6.3 6.0 - 8.4 g/dL    Albumin 3.6 3.5 - 5.2 g/dL    Total Bilirubin 1.0 0.1 - 1.0 mg/dL    Alkaline Phosphatase 107 40 - 150 U/L    AST 13 10 - 40 U/L    ALT 15 10 - 44 U/L    eGFR >60 >60 mL/min/1.73 m^2    Anion Gap 12 8 - 16 mmol/L   Troponin I #1    Collection Time: 02/26/25 11:33 AM   Result Value Ref Range    Troponin I <0.006 0.000 - 0.026 ng/mL   BNP    Collection Time: 02/26/25 11:33 AM   Result Value Ref Range     (H) 0 - 99 pg/mL   Procalcitonin    Collection Time: 02/26/25 11:33 AM   Result Value Ref Range    Procalcitonin 0.03 <0.25 ng/mL   Lactic acid, plasma    Collection Time: 02/26/25 11:33 AM   Result Value Ref Range    Lactate (Lactic Acid) 3.1 (H) 0.5 - 2.2 mmol/L   Protime-INR    Collection Time: 02/26/25 11:33 AM   Result Value Ref Range    Prothrombin Time 11.4 9.0 - 12.5 sec    INR 1.0 0.8 - 1.2   APTT    Collection Time: 02/26/25 11:33 AM   Result Value Ref Range    aPTT 21.7 21.0 - 32.0 sec   HCV Virus Hold Specimen    Collection Time: 02/26/25 11:33 AM   Result  Value Ref Range    HEP C Virus Hold Specimen Hold for HCV sendout    Hemoglobin A1c if not done in past 3 months    Collection Time: 02/26/25 11:33 AM   Result Value Ref Range    Hemoglobin A1C 6.3 (H) 4.0 - 5.6 %    Estimated Avg Glucose 134 (H) 68 - 131 mg/dL   Blood Culture #2 **CANNOT BE ORDERED STAT**    Collection Time: 02/26/25 11:34 AM    Specimen: Peripheral, Antecubital, Right; Blood   Result Value Ref Range    Blood Culture, Routine No Growth to date     Blood Culture, Routine No Growth to date     Blood Culture, Routine No Growth to date    Culture, Respiratory with Gram Stain    Collection Time: 02/26/25 11:37 AM    Specimen: Sputum; Respiratory   Result Value Ref Range    Respiratory Culture Normal respiratory kush     Gram Stain (Respiratory) <10 epithelial cells per low power field.     Gram Stain (Respiratory) Few WBC's     Gram Stain (Respiratory) Few Gram positive cocci     Gram Stain (Respiratory) Rare Gram negative rods    ISTAT PROCEDURE    Collection Time: 02/26/25 12:30 PM   Result Value Ref Range    POC PH 7.406 7.35 - 7.45    POC PCO2 40.7 35 - 45 mmHg    POC PO2 51 (LL) 80 - 100 mmHg    POC HCO3 25.6 24 - 28 mmol/L    POC BE 1 -2 to 2 mmol/L    POC SATURATED O2 86 95 - 100 %    Sample ARTERIAL     Site LB     Allens Test Pass     DelSys Nasal Can     Mode SPONT     Flow 4    Echo    Collection Time: 02/26/25  3:00 PM   Result Value Ref Range    BSA 2.2 m2    LA WIDTH 4.2 cm    RA Width 4.1 cm    LVOT stroke volume 68.5 cm3    LVIDd 5.0 3.5 - 6.0 cm    LV Systolic Volume 119 mL    LV Systolic Volume Index 54.8 mL/m2    LVIDs 3.3 2.1 - 4.0 cm    IVS 1.1 0.6 - 1.1 cm    LVPWS 1.10 cm    LVOT diameter 2.3 cm    LVOT area 4.2 cm2    FS 34.0 28 - 44 %    MV Peak E Pop 0.84 m/s    TDI LATERAL 0.12 m/s    TDI SEPTAL 0.10 m/s    E/E' ratio 8 m/s    IVRT 51 msec    LV SEPTAL E/E' RATIO 8.4 m/s    MARICRUZ 32 mL/m2    LV LATERAL E/E' RATIO 7.0 m/s    LA Vol 70 cm3    LVOT peak pop 0.9 m/s    RV-calvo mid  d 3.2 cm    RVOT peak VTI 10.0 cm    TAPSE 2.80 cm    LA size 4.2 cm    Left Atrium Minor Axis 4.2 cm    Left Atrium Major Axis 5.3 cm    AV mean gradient 36 mmHg    AV peak gradient 46 mmHg    Aortic valve mean velocity 2.96 m/s    Ao peak christina 3.4 m/s    Ao VTI 65.9 cm    LVOT mn grad 2 mmHg    AV LVOT peak gradient 3 mmHg    LVOT peak VTI 16.5 cm    AV valve area 1.0 cm²    AV Velocity Ratio 0.26     AV index (prosthetic) 0.25     ANGELICA by Velocity Ratio 1.1 cm²    MV stenosis pressure 1/2 time 60.0 ms    MV valve area p 1/2 method 3.67 cm2    PV mean gradient 1 mmHg    PV PEAK VELOCITY 0.59 m/s    PV peak gradient 1 mmHg    Pulmonic Valve Systolic Velocity Time Integral 10.0 cm    RVOT pk grad 1.4 mmHg    Sinus 3.6 cm    STJ 3.5 cm    Ascending aorta 3.7 cm    IVC diameter 1.4 cm    Mean e' 0.11 m/s    ZLVPWS -3.56     ZLVIDS -2.22     ZLVIDD -3.62     EF 60 %    Est. RA pres 3 mmHg   POCT glucose    Collection Time: 02/26/25  9:48 PM   Result Value Ref Range    POCT Glucose 355 (H) 70 - 110 mg/dL   Comprehensive Metabolic Panel (CMP)    Collection Time: 02/27/25  4:58 AM   Result Value Ref Range    Sodium 139 136 - 145 mmol/L    Potassium 4.4 3.5 - 5.1 mmol/L    Chloride 104 95 - 110 mmol/L    CO2 27 23 - 29 mmol/L    Glucose 292 (H) 70 - 110 mg/dL    BUN 20 8 - 23 mg/dL    Creatinine 1.0 0.5 - 1.4 mg/dL    Calcium 9.0 8.7 - 10.5 mg/dL    Total Protein 6.2 6.0 - 8.4 g/dL    Albumin 3.2 (L) 3.5 - 5.2 g/dL    Total Bilirubin 0.8 0.1 - 1.0 mg/dL    Alkaline Phosphatase 99 40 - 150 U/L    AST 10 10 - 40 U/L    ALT 13 10 - 44 U/L    eGFR >60 >60 mL/min/1.73 m^2    Anion Gap 8 8 - 16 mmol/L   Magnesium    Collection Time: 02/27/25  4:58 AM   Result Value Ref Range    Magnesium 1.7 1.6 - 2.6 mg/dL   Phosphorus    Collection Time: 02/27/25  4:58 AM   Result Value Ref Range    Phosphorus 3.1 2.7 - 4.5 mg/dL   Troponin I    Collection Time: 02/27/25  4:58 AM   Result Value Ref Range    Troponin I 0.006 0.000 - 0.026 ng/mL    CBC with Automated Differential    Collection Time: 02/27/25  4:58 AM   Result Value Ref Range    WBC 12.26 3.90 - 12.70 K/uL    RBC 4.44 (L) 4.60 - 6.20 M/uL    Hemoglobin 13.2 (L) 14.0 - 18.0 g/dL    Hematocrit 40.5 40.0 - 54.0 %    MCV 91 82 - 98 fL    MCH 29.7 27.0 - 31.0 pg    MCHC 32.6 32.0 - 36.0 g/dL    RDW 14.6 (H) 11.5 - 14.5 %    Platelets 174 150 - 450 K/uL    MPV 10.4 9.2 - 12.9 fL    Immature Granulocytes 0.4 0.0 - 0.5 %    Gran # (ANC) 11.1 (H) 1.8 - 7.7 K/uL    Immature Grans (Abs) 0.05 (H) 0.00 - 0.04 K/uL    Lymph # 0.9 (L) 1.0 - 4.8 K/uL    Mono # 0.2 (L) 0.3 - 1.0 K/uL    Eos # 0.0 0.0 - 0.5 K/uL    Baso # 0.01 0.00 - 0.20 K/uL    nRBC 0 0 /100 WBC    Gran % 90.8 (H) 38.0 - 73.0 %    Lymph % 7.0 (L) 18.0 - 48.0 %    Mono % 1.7 (L) 4.0 - 15.0 %    Eosinophil % 0.0 0.0 - 8.0 %    Basophil % 0.1 0.0 - 1.9 %    Differential Method Automated    Lactic acid, plasma    Collection Time: 02/27/25  8:22 AM   Result Value Ref Range    Lactate (Lactic Acid) 3.3 (H) 0.5 - 2.2 mmol/L   POCT glucose    Collection Time: 02/27/25 11:58 AM   Result Value Ref Range    POCT Glucose 292 (H) 70 - 110 mg/dL   Lactic acid, plasma    Collection Time: 02/27/25  2:43 PM   Result Value Ref Range    Lactate (Lactic Acid) 4.0 (HH) 0.5 - 2.2 mmol/L   Magnesium    Collection Time: 02/27/25  2:43 PM   Result Value Ref Range    Magnesium 1.6 1.6 - 2.6 mg/dL   POCT glucose    Collection Time: 02/27/25  6:00 PM   Result Value Ref Range    POCT Glucose 339 (H) 70 - 110 mg/dL   POCT glucose    Collection Time: 02/27/25  9:52 PM   Result Value Ref Range    POCT Glucose 273 (H) 70 - 110 mg/dL   Comprehensive Metabolic Panel (CMP)    Collection Time: 02/28/25  4:59 AM   Result Value Ref Range    Sodium 138 136 - 145 mmol/L    Potassium 4.9 3.5 - 5.1 mmol/L    Chloride 102 95 - 110 mmol/L    CO2 26 23 - 29 mmol/L    Glucose 251 (H) 70 - 110 mg/dL    BUN 24 (H) 8 - 23 mg/dL    Creatinine 1.2 0.5 - 1.4 mg/dL    Calcium 8.9 8.7 - 10.5  mg/dL    Total Protein 6.1 6.0 - 8.4 g/dL    Albumin 3.1 (L) 3.5 - 5.2 g/dL    Total Bilirubin 0.7 0.1 - 1.0 mg/dL    Alkaline Phosphatase 87 40 - 150 U/L    AST 15 10 - 40 U/L    ALT 12 10 - 44 U/L    eGFR 60 >60 mL/min/1.73 m^2    Anion Gap 10 8 - 16 mmol/L   Magnesium    Collection Time: 02/28/25  4:59 AM   Result Value Ref Range    Magnesium 2.3 1.6 - 2.6 mg/dL   Phosphorus    Collection Time: 02/28/25  4:59 AM   Result Value Ref Range    Phosphorus 2.9 2.7 - 4.5 mg/dL   CBC with Automated Differential    Collection Time: 02/28/25  4:59 AM   Result Value Ref Range    WBC 16.56 (H) 3.90 - 12.70 K/uL    RBC 4.45 (L) 4.60 - 6.20 M/uL    Hemoglobin 13.0 (L) 14.0 - 18.0 g/dL    Hematocrit 40.7 40.0 - 54.0 %    MCV 92 82 - 98 fL    MCH 29.2 27.0 - 31.0 pg    MCHC 31.9 (L) 32.0 - 36.0 g/dL    RDW 14.6 (H) 11.5 - 14.5 %    Platelets 212 150 - 450 K/uL    MPV 10.3 9.2 - 12.9 fL    Immature Granulocytes 0.7 (H) 0.0 - 0.5 %    Gran # (ANC) 15.3 (H) 1.8 - 7.7 K/uL    Immature Grans (Abs) 0.11 (H) 0.00 - 0.04 K/uL    Lymph # 0.7 (L) 1.0 - 4.8 K/uL    Mono # 0.4 0.3 - 1.0 K/uL    Eos # 0.0 0.0 - 0.5 K/uL    Baso # 0.01 0.00 - 0.20 K/uL    nRBC 0 0 /100 WBC    Gran % 92.2 (H) 38.0 - 73.0 %    Lymph % 4.5 (L) 18.0 - 48.0 %    Mono % 2.5 (L) 4.0 - 15.0 %    Eosinophil % 0.0 0.0 - 8.0 %    Basophil % 0.1 0.0 - 1.9 %    Differential Method Automated    POCT glucose    Collection Time: 02/28/25  6:14 AM   Result Value Ref Range    POCT Glucose 206 (H) 70 - 110 mg/dL   Lactic acid, plasma    Collection Time: 02/28/25  7:54 AM   Result Value Ref Range    Lactate (Lactic Acid) 3.1 (H) 0.5 - 2.2 mmol/L       Imaging Results:  Imaging Results              X-Ray Chest AP Portable (Final result)  Result time 02/26/25 11:39:09      Final result by Diogo Valle MD (02/26/25 11:39:09)                   Impression:      1.  Interval development of increased density throughout the mid lower right lung most consistent with early  pneumonia.    2.  Stable findings as noted above.      Electronically signed by: Diogo Valle MD  Date:    02/26/2025  Time:    11:39               Narrative:    EXAMINATION:  XR CHEST AP PORTABLE    CLINICAL HISTORY:  shortness of breath;    COMPARISON:  Studies dating back to February 25, 2022    FINDINGS:  Increased density throughout the inferior half of the right lung is superimposed on chronic peripheral and basilar reticular interstitial changes throughout the lungs.  Stable blunting of the costophrenic angles.  The lungs are otherwise free of new pulmonary opacities.  The cardiac silhouette size is normal. The trachea is midline and the mediastinal width is normal. Negative for pneumothorax.  Pulmonary vasculature is normal. Negative for osseous abnormalities. Implanted loop recorder.  Ectatic and tortuous aorta with aortic arch calcifications.  Degenerative changes of the spine.                                       The EKG was ordered, reviewed, and independently interpreted by the ED provider.  Interpretation time: 11:23  Rate: 92 BPM  Rhythm: normal sinus rhythm  Interpretation: Possible left atrial enlargement. Left bundle branch block. No STEMI.           The Emergency Provider reviewed the vital signs and test results, which are outlined above.     ED Discussion     1:34 PM: Discussed case with ANTONIO Rae ([facility]). ANTONIO Erazo agrees with current care and management of pt and accepts admission.   Admitting Service: [facility]  Admitting Physician: Dr. Akers  Admit to: Med/Tele inpt    1:34 PM: Re-evaluated pt.  I have discussed test results, shared treatment plan, and the need for admission with patient/family/caretaker at bedside. Pt and/or family/caretaker express understanding at this time and agree with all information. All questions answered. Pt/caretaker/family member(s) have no further questions or concerns at this time. Pt is ready for admit.                 Medical Decision Making  DDX: 1. COPD exacerbation 2. Pneumonia 3. Viral URI 4. PTX    ECG NSR, no acute ischemic changes, CXR shows right lower lobe infiltrates, septic workup done, wbc elevated, lactate 3.1 but not hypotensive and BNP over 600 so fluids held, procal normal, flu and covid negative, troponin normal, IV antibiotics initiated, breathing tx, hospital med to admit.     Amount and/or Complexity of Data Reviewed  Labs: ordered. Decision-making details documented in ED Course.  Radiology: ordered. Decision-making details documented in ED Course.  ECG/medicine tests: ordered and independent interpretation performed. Decision-making details documented in ED Course.  Discussion of management or test interpretation with external provider(s): Hospital Medicine     Risk  Prescription drug management.  Decision regarding hospitalization.  Diagnosis or treatment significantly limited by social determinants of health.       Additional MDM:   Sepsis:   This patient does have evidence of infective focus  My overall impression is sepsis.  Source: Respiratory  Antibiotics given- Antibiotics     Patient Encounter Information Not Found      Latest lactate reviewed- 3.1  Organ dysfunction indicated by Acute respiratory failure    Fluid challenge Fluid Not Needed - Patient is not hypotensive and/or lactate is less than 4.0.     Post- resuscitation assessment Yes - I attest a sepsis perfusion exam was performed within 6 hours of sepsis, severe sepsis, or septic shock presentation, following fluid resuscitation. GCS 15, breathing improved with duoneb, steroids, IV antibiotics, cap refill brisk, resp stable, heart rate normal      Will Not start Pressors- Levophed for MAP of 65  Source control achieved by: IV rocephin and doxy                ED Medication(s):  Medications   albuterol-ipratropium 2.5 mg-0.5 mg/3 mL nebulizer solution 3 mL (3 mLs Nebulization Given 2/26/25 1232)   cefTRIAXone injection 2 g (2 g Intravenous  Given 2/26/25 1258)   insulin glargine U-100 (Lantus) pen 16 Units (16 Units Subcutaneous Given 2/27/25 1341)   magnesium sulfate 2g in water 50mL IVPB (premix) (0 g Intravenous Stopped 2/27/25 1540)   magnesium sulfate 2g in water 50mL IVPB (premix) (0 g Intravenous Stopped 2/27/25 2031)       Discharge Medication List as of 2/28/2025 12:35 PM        START taking these medications    Details   cefdinir (OMNICEF) 300 MG capsule Take 1 capsule (300 mg total) by mouth 2 (two) times daily. for 5 days, Starting Fri 2/28/2025, Until Wed 3/5/2025, Normal      doxycycline (VIBRAMYCIN) 100 MG Cap Take 1 capsule (100 mg total) by mouth every 12 (twelve) hours. for 5 days, Starting Fri 2/28/2025, Until Wed 3/5/2025, Normal      insulin glargine U-100, Lantus, 100 unit/mL injection Inject 16 Units into the skin once daily., Starting Fri 2/28/2025, Until Sat 2/28/2026, Normal      methylPREDNISolone (MEDROL) 4 MG Tab Multiple Dosages:Starting Fri 2/28/2025, Until Tue 3/4/2025 at 2359, THEN Starting Wed 3/5/2025, Until Sun 3/9/2025 at 2359Take 5 tablets (20 mg total) by mouth once daily for 5 days, THEN 2.5 tablets (10 mg total) once daily for 5 days. use as direct ed., Normal              Follow-up Information       Nessa Thorpe MD. Schedule an appointment as soon as possible for a visit.    Specialty: Pediatrics  Contact information:  [address] [address] [address]  [phone]               [facility] PULMONARY MEDICINE. Schedule an appointment as soon as possible for a visit.    Specialty: Pulmonology  Contact information:  [address] [place] [address] [phone]                               Scribe Attestation:   Scribe #1: I performed the above scribed service and the documentation accurately describes the services I performed. I attest to the accuracy of the note.     Attending:   Physician Attestation Statement for Scribe #1: Kristy REEVES Ashlyn, MD, personally performed  the services described in this documentation, as scribed by Karl Torres, in my presence, and it is both accurate and complete.           Clinical Impression       ICD-10-CM ICD-9-CM   1. Pneumonia of right lower lobe due to infectious organism  J18.9 486   2. Shortness of breath  R06.02 786.05   3. Elevated brain natriuretic peptide (BNP) level  R79.89 790.99   4. Acute on chronic hypoxic respiratory failure  J96.21 518.84     799.02   5. ILD (interstitial lung disease)  J84.9 515   6. Acute exacerbation of chronic obstructive pulmonary disease (COPD)  J44.1 491.21   7. Chest pain  R07.9 786.50   8. Pneumonia of right lung due to infectious organism, unspecified part of lung  J18.9 483.8   9. Interstitial pulmonary disease, unspecified  J84.9 515       Disposition:   Disposition: Admitted  Condition: Yaneth Doyle MD  03/01/25 5412

## 2025-02-26 NOTE — HPI
84 y.o. male patient with comorbid conditions of interstitial lung disease, DM, COPD, HTN, and chronic respiratory failure with hypoxia. To ED with c/o of worsening dyspnea over the past few weeks. Pt initially was seen by pulmonary on the 18th for the same complaint where he was prescribed prednisone, with no improvement. Associated sxs include chills and productive cough (blood tinged sputum). Patient denies any fever. Prior Tx includes 125mg Solumedrol by EMS PTA.  No further complaints or concerns at this time.     ED course: CBC WBC 14.17, hgb- 13.9. BMP unremarkable, . Negative troponin. Elevated lactic acid- 3.1. ABG with PaO2- 51. CXR- Interval development of increased density throughout the mid lower right lung most consistent with early pneumonia. Echo pending. Admitted to Hospital Medicine for pneumonia, hemoptysis, and worsening SOB.   HPI:  Charo Adair is a 81-year-old woman who presents emergency department with left calf pain today.  She reports that it feels a charley horse.  She denies any injury.  She has not had any prolonged hospitalization, immobilization, recent travel, hormone use, history of blood clots, or unilateral leg swelling.      PMH:  Past Medical History:   Diagnosis Date    Allergic conjunctivitis     Amblyopia     Anisometropia     Anxiety     Arthritis     COVID     Diabetes mellitus  (CMD)     Essential (primary) hypertension     Low back pain     Presbyopia     RAD (reactive airway disease) (CMD)     Restless leg syndrome     Sleep apnea     Subdural hematoma  (CMD)     Tibialis tendinitis          PSH:  Past Surgical History:   Procedure Laterality Date    Appendectomy      Colon resection with small bowel resection and single anastomosis      Esophagogastroduodenoscopy transoral flex diag  06/2014    [facility]    Hiatal hernia repair  1975    Hysterectomy      Pacemaker            Meds:  No current facility-administered medications for this encounter.     Current Outpatient Medications   Medication Sig Dispense Refill    amLODIPine (NORVASC) 2.5 MG tablet [None received]      Baclofen 5 MG tablet [None received]      carvedilol (COREG) 12.5 MG tablet Take 1 tablet by mouth in the morning and 1 tablet in the evening. Take with meals.      omeprazole (PrilOSEC) 20 MG capsule Take 20 mg by mouth daily.      senna (SENOKOT) 8.6 MG tablet Take 1 tablet by mouth daily.      melatonin 5 MG Take 2 tablets by mouth nightly.      ropinirole (REQUIP) 4 MG tablet Take 4 mg by mouth in the morning and 4 mg at noon and 4 mg in the evening.      lidocaine (LIDOCARE) 4 % patch Place 1 patch onto the skin daily as needed for Pain. *On for 12 hours, off for 12 hours*      entacapone (COMTAN) 200 MG tablet Take 200 mg by mouth in the morning and 200 mg in the evening. Parkinson's      ferrous sulfate 325 (65 FE) MG tablet Take 325  mg by mouth daily.      magnesium oxide 250 MG tablet Take 250 mg by mouth daily. Restless Legs      pramipexole (MIRAPEX) 0.25 MG tablet Take 0.25 mg by mouth in the morning and 0.25 mg at noon and 0.25 mg in the evening. Parkinson's      Menthol, Topical Analgesic, (Biofreeze) 4 % Gel Apply 1 Application topically every 4 hours as needed (Bilateral Lower Extremity Pain).      diphenhydrAMINE (BENADRYL) 25 MG capsule Take 25 mg by mouth every 8 hours as needed for Itching.      guaiFENesin 100 MG/5ML Take 200 mg by mouth every 6 hours as needed (cough).      loperamide (IMODIUM) 2 MG capsule Take 2 mg by mouth 4 times daily as needed for Diarrhea.      carbidopa-levodopa (SINEMET)  MG per tablet Take 1 tablet by mouth 4 times daily. 120 tablet 5    ARIPiprazole (ABILIFY) 5 MG tablet Take 5 mg by mouth at bedtime.      traZODone (DESYREL) 50 MG tablet Take 25 mg by mouth nightly.      benzonatate (TESSALON PERLES) 100 MG capsule Take 1 capsule by mouth 3 times daily as needed for Cough. 30 capsule 0    HYDROcodone-acetaminophen (NORCO) 5-325 MG per tablet Take 1 tablet by mouth every 6 hours as needed for Pain. 12 tablet 0    albuterol 108 (90 Base) MCG/ACT inhaler Inhale 2 puffs into the lungs 4 times daily as needed for Shortness of Breath.      aspirin 325 MG EC tablet Take 325 mg by mouth nightly.      DULoxetine (CYMBALTA) 60 MG capsule Take 60 mg by mouth in the morning and 60 mg in the evening.      Vitamin D, Ergocalciferol, 1.25 mg (50,000 units) capsule Take 1.25 mg by mouth 1 day a week. On Saturday      gabapentin (NEURONTIN) 100 MG capsule Take 200 mg by mouth in the morning and 200 mg in the evening. At 0800 and 1400      gabapentin (NEURONTIN) 600 MG tablet Take 600 mg by mouth at bedtime.      triamcinolone (ARISTOCORT) 0.1 % cream Apply topically to rash on arms and legs twice daily as needed for itching      acetaminophen (TYLENOL) 325 MG tablet TAKE 2 TABLETS (=650MG) BY MOUTH EVERY 4 HOURS  AS NEEDED FOR PAIN OR FEVER. 60 tablet 4         Allergies:  ALLERGIES:   Allergen Reactions    Bee Sting SHORTNESS OF BREATH    Epinephrine SEIZURES     Other reaction(s): Other (reaction documented in comments)  seizures    Lisinopril SHORTNESS OF BREATH    Cephalosporins Other (See Comments)     Altered Mental Status  Other reaction(s): Change in Mental Status, Other (reaction documented in comments)  unknown    Hydrochlorothiazide SWELLING    Keflex HIVES    Macrolides And Ketolides Other (See Comments)     Altered mental status  Other reaction(s): Change in Mental Status, Other (reaction documented in comments)  Pt unsure of allergy  Pt unsure of reaction      Nsaids Other (See Comments)     Altered mental status  Other reaction(s): Change in Mental Status, Other (reaction documented in comments)  Pt unsure of reaction    Tramadol Other (See Comments)     Altered mental status    Other reaction(s): Change in Mental Status, Other (reaction documented in comments)  Pt unsure of reaction    Valsartan RASH         Social:  Social Determinants of Health     Interpersonal Safety: Low Risk  (1/16/2025)    Interpersonal Safety     How often physically hurt: Never     How often insulted or talked down to: Never     How often threatened with harm: Never     How often scream or curse at: Never   Social Connections: Low Risk  (4/22/2024)    Social Connections     Social Connectivity: 5 or more times a week   Alcohol Use: Not At Risk (4/22/2024)    Alcohol Use     Total Audit-C Score: 0   Tobacco Use: Low Risk  (1/16/2025)    Patient History     Smoking Tobacco Use: Never     Smokeless Tobacco Use: Never     Passive Exposure: Not on file   Financial Resource Strain: Low Risk  (4/23/2024)    Financial Resource Strain     Unable to Get: None   Stress: Not on file   Physical Activity: Not on file   Food Insecurity: Low Risk  (4/22/2024)    Food Insecurity     Worried about Food: Never true     Food is Gone: Never true    Transportation Needs: Not At Risk (4/22/2024)    Transportation Needs     Lack of Reliable Transportation: No   Inadequate Housing: Low Risk  (10/3/2024)    Inadequate Housing     Living Situation: I have a steady place to live     Housing Problems: None of the above   Depression: Not at risk (4/22/2024)    PHQ-2     PHQ-2 Score: 1   Utilities: Not At Risk (4/22/2024)    Utilities     Threatened with loss of utilities: No         ROS:   Negative except as noted in the HPI        Vitals:    01/16/25 1720   BP: (!) 147/104   Pulse: 85   Resp: 18   Temp: 98 °F (36.7 °C)         PE:    General: Comfortable, appears stated age, no acute distress  HEENT: PERRL, EOMI, MMM, voice normal, normal neck ROM, no LAD  Cards: RRR, no murmur appreciated  Pulm: Comfortable on room air, CTAB  Abd: Soft, nontender, nondistended  Ext: Atraumatic, No LE edema. No unilateral swelling. No discoloration or skin changes. Minimal calf tenderness  Neuro: A&Ox3, CN grossly intact, normal speech, motor/sensory grossly intact and symmetric  Psych: Mood appropriate to situation      MDM:  Charo Adair is an 80yo woman who presents to the ED with left calf pain today.  - Overall very reassuring exam without swelling, discoloration, injury or significant tenderness  - Not consistent with cellulitis or bony injury. Low suspicion for DVT based on history and physical exam  - Acetaminophen, tizanidine, reassess    Update 18:50  - Patient reports that her pain is improved after acetaminophen and tizanidine but still present  - Duplex ultrasound ordered though low suspicion for DVT    Update 20:12  - Ultrasound notable for a baker's cyst. Likely the cause of her discomfort.   - Safe for discharge with continued outpatient management. Home care and return precautions discussed.              Sabina Ha MD  01/16/25 2016

## 2025-02-27 DIAGNOSIS — J44.9 COPD, MODERATE: Primary | ICD-10-CM

## 2025-02-27 LAB
ALBUMIN SERPL BCP-MCNC: 3.2 G/DL (ref 3.5–5.2)
ALP SERPL-CCNC: 99 U/L (ref 40–150)
ALT SERPL W/O P-5'-P-CCNC: 13 U/L (ref 10–44)
ANION GAP SERPL CALC-SCNC: 8 MMOL/L (ref 8–16)
AORTIC VALVE MEAN VELOCITY: 2.96 M/S
ASCENDING AORTA: 3.7 CM
AST SERPL-CCNC: 10 U/L (ref 10–40)
AV INDEX (PROSTH): 0.25
AV LVOT MEAN GRADIENT: 2 MMHG
AV LVOT PEAK GRADIENT: 3 MMHG
AV MEAN GRADIENT: 36 MMHG
AV PEAK GRADIENT: 46 MMHG
AV VALVE AREA BY VELOCITY RATIO: 1.1 CM²
AV VALVE AREA: 1 CM²
AV VELOCITY RATIO: 0.26
BASOPHILS # BLD AUTO: 0.01 K/UL (ref 0–0.2)
BASOPHILS NFR BLD: 0.1 % (ref 0–1.9)
BILIRUB SERPL-MCNC: 0.8 MG/DL (ref 0.1–1)
BSA FOR ECHO PROCEDURE: 2.2 M2
BUN SERPL-MCNC: 20 MG/DL (ref 8–23)
CALCIUM SERPL-MCNC: 9 MG/DL (ref 8.7–10.5)
CHLORIDE SERPL-SCNC: 104 MMOL/L (ref 95–110)
CO2 SERPL-SCNC: 27 MMOL/L (ref 23–29)
CREAT SERPL-MCNC: 1 MG/DL (ref 0.5–1.4)
DIFFERENTIAL METHOD BLD: ABNORMAL
DOP CALC AO PEAK VEL: 3.4 M/S
DOP CALC AO VTI: 65.9 CM
DOP CALC LVOT AREA: 4.2 CM2
DOP CALC LVOT DIAMETER: 2.3 CM
DOP CALC LVOT PEAK VEL: 0.9 M/S
DOP CALC LVOT STROKE VOLUME: 68.5 CM3
DOP CALC RVOT VTI: 10 CM
DOP CALCLVOT PEAK VEL VTI: 16.5 CM
E/E' RATIO: 8 M/S
EJECTION FRACTION: 60 %
EOSINOPHIL # BLD AUTO: 0 K/UL (ref 0–0.5)
EOSINOPHIL NFR BLD: 0 % (ref 0–8)
ERYTHROCYTE [DISTWIDTH] IN BLOOD BY AUTOMATED COUNT: 14.6 % (ref 11.5–14.5)
EST. GFR  (NO RACE VARIABLE): >60 ML/MIN/1.73 M^2
ESTIMATED AVG GLUCOSE: 134 MG/DL (ref 68–131)
FRACTIONAL SHORTENING: 34 % (ref 28–44)
GLUCOSE SERPL-MCNC: 292 MG/DL (ref 70–110)
HBA1C MFR BLD: 6.3 % (ref 4–5.6)
HCT VFR BLD AUTO: 40.5 % (ref 40–54)
HGB BLD-MCNC: 13.2 G/DL (ref 14–18)
IMM GRANULOCYTES # BLD AUTO: 0.05 K/UL (ref 0–0.04)
IMM GRANULOCYTES NFR BLD AUTO: 0.4 % (ref 0–0.5)
INTERVENTRICULAR SEPTUM: 1.1 CM (ref 0.6–1.1)
IVC DIAMETER: 1.4 CM
IVRT: 51 MSEC
LA MAJOR: 5.3 CM
LA MINOR: 4.2 CM
LA WIDTH: 4.2 CM
LACTATE SERPL-SCNC: 3.3 MMOL/L (ref 0.5–2.2)
LACTATE SERPL-SCNC: 4 MMOL/L (ref 0.5–2.2)
LEFT ATRIUM SIZE: 4.2 CM
LEFT ATRIUM VOLUME INDEX: 32 ML/M2
LEFT ATRIUM VOLUME: 70 CM3
LEFT INTERNAL DIMENSION IN SYSTOLE: 3.3 CM (ref 2.1–4)
LEFT VENTRICLE SYSTOLIC VOLUME INDEX: 54.8 ML/M2
LEFT VENTRICLE SYSTOLIC VOLUME: 119 ML
LEFT VENTRICULAR INTERNAL DIMENSION IN DIASTOLE: 5 CM (ref 3.5–6)
LEFT VENTRICULAR POSTERIOR WALL IN END SYSTOLE: 1.1 CM
LV LATERAL E/E' RATIO: 7 M/S
LV SEPTAL E/E' RATIO: 8.4 M/S
LYMPHOCYTES # BLD AUTO: 0.9 K/UL (ref 1–4.8)
LYMPHOCYTES NFR BLD: 7 % (ref 18–48)
MAGNESIUM SERPL-MCNC: 1.6 MG/DL (ref 1.6–2.6)
MAGNESIUM SERPL-MCNC: 1.7 MG/DL (ref 1.6–2.6)
MCH RBC QN AUTO: 29.7 PG (ref 27–31)
MCHC RBC AUTO-ENTMCNC: 32.6 G/DL (ref 32–36)
MCV RBC AUTO: 91 FL (ref 82–98)
MONOCYTES # BLD AUTO: 0.2 K/UL (ref 0.3–1)
MONOCYTES NFR BLD: 1.7 % (ref 4–15)
MV PEAK E VEL: 0.84 M/S
MV STENOSIS PRESSURE HALF TIME: 60 MS
MV VALVE AREA P 1/2 METHOD: 3.67 CM2
NEUTROPHILS # BLD AUTO: 11.1 K/UL (ref 1.8–7.7)
NEUTROPHILS NFR BLD: 90.8 % (ref 38–73)
NRBC BLD-RTO: 0 /100 WBC
OHS QRS DURATION: 148 MS
OHS QTC CALCULATION: 499 MS
PHOSPHATE SERPL-MCNC: 3.1 MG/DL (ref 2.7–4.5)
PLATELET # BLD AUTO: 174 K/UL (ref 150–450)
PMV BLD AUTO: 10.4 FL (ref 9.2–12.9)
POCT GLUCOSE: 273 MG/DL (ref 70–110)
POCT GLUCOSE: 292 MG/DL (ref 70–110)
POCT GLUCOSE: 339 MG/DL (ref 70–110)
POTASSIUM SERPL-SCNC: 4.4 MMOL/L (ref 3.5–5.1)
PROT SERPL-MCNC: 6.2 G/DL (ref 6–8.4)
PV MEAN GRADIENT: 1 MMHG
PV PEAK GRADIENT: 1 MMHG
PV PEAK VELOCITY: 0.59 M/S
PV VTI: 10 CM
RA PRESSURE ESTIMATED: 3 MMHG
RA WIDTH: 4.1 CM
RBC # BLD AUTO: 4.44 M/UL (ref 4.6–6.2)
RIGHT VENTRICLAR OUTFLOW TRACT PEAK GRADIENT: 1.4 MMHG
RIGHT VENTRICLE DIASTOLIC MID DIMENSION: 3.2 CM
SINUS: 3.6 CM
SODIUM SERPL-SCNC: 139 MMOL/L (ref 136–145)
STJ: 3.5 CM
TDI LATERAL: 0.12 M/S
TDI SEPTAL: 0.1 M/S
TDI: 0.11 M/S
TRICUSPID ANNULAR PLANE SYSTOLIC EXCURSION: 2.8 CM
TROPONIN I SERPL DL<=0.01 NG/ML-MCNC: 0.01 NG/ML (ref 0–0.03)
WBC # BLD AUTO: 12.26 K/UL (ref 3.9–12.7)
Z-SCORE OF LEFT VENTRICULAR DIMENSION IN END DIASTOLE: -3.62
Z-SCORE OF LEFT VENTRICULAR DIMENSION IN END SYSTOLE: -2.22
Z-SCORE OF LEFT VENTRICULAR POSTERIOR WALL IN END SYSTOLE: -3.56

## 2025-02-27 PROCEDURE — 99900035 HC TECH TIME PER 15 MIN (STAT)

## 2025-02-27 PROCEDURE — 83605 ASSAY OF LACTIC ACID: CPT

## 2025-02-27 PROCEDURE — 27000221 HC OXYGEN, UP TO 24 HOURS

## 2025-02-27 PROCEDURE — 83735 ASSAY OF MAGNESIUM: CPT | Performed by: INTERNAL MEDICINE

## 2025-02-27 PROCEDURE — 25000242 PHARM REV CODE 250 ALT 637 W/ HCPCS

## 2025-02-27 PROCEDURE — 80053 COMPREHEN METABOLIC PANEL: CPT | Performed by: INTERNAL MEDICINE

## 2025-02-27 PROCEDURE — 63600175 PHARM REV CODE 636 W HCPCS: Performed by: INTERNAL MEDICINE

## 2025-02-27 PROCEDURE — 25000003 PHARM REV CODE 250

## 2025-02-27 PROCEDURE — 94640 AIRWAY INHALATION TREATMENT: CPT

## 2025-02-27 PROCEDURE — 63600175 PHARM REV CODE 636 W HCPCS

## 2025-02-27 PROCEDURE — 84100 ASSAY OF PHOSPHORUS: CPT | Performed by: INTERNAL MEDICINE

## 2025-02-27 PROCEDURE — 94761 N-INVAS EAR/PLS OXIMETRY MLT: CPT

## 2025-02-27 PROCEDURE — 36415 COLL VENOUS BLD VENIPUNCTURE: CPT

## 2025-02-27 PROCEDURE — 85025 COMPLETE CBC W/AUTO DIFF WBC: CPT | Performed by: INTERNAL MEDICINE

## 2025-02-27 PROCEDURE — 94799 UNLISTED PULMONARY SVC/PX: CPT

## 2025-02-27 PROCEDURE — 36415 COLL VENOUS BLD VENIPUNCTURE: CPT | Performed by: INTERNAL MEDICINE

## 2025-02-27 PROCEDURE — 25000003 PHARM REV CODE 250: Performed by: STUDENT IN AN ORGANIZED HEALTH CARE EDUCATION/TRAINING PROGRAM

## 2025-02-27 PROCEDURE — 63600175 PHARM REV CODE 636 W HCPCS: Performed by: STUDENT IN AN ORGANIZED HEALTH CARE EDUCATION/TRAINING PROGRAM

## 2025-02-27 PROCEDURE — 84484 ASSAY OF TROPONIN QUANT: CPT | Performed by: INTERNAL MEDICINE

## 2025-02-27 PROCEDURE — 25000003 PHARM REV CODE 250: Performed by: INTERNAL MEDICINE

## 2025-02-27 PROCEDURE — 21400001 HC TELEMETRY ROOM

## 2025-02-27 RX ORDER — IBUPROFEN 200 MG
16 TABLET ORAL
Status: DISCONTINUED | OUTPATIENT
Start: 2025-02-27 | End: 2025-02-28 | Stop reason: HOSPADM

## 2025-02-27 RX ORDER — INSULIN ASPART 100 [IU]/ML
0-10 INJECTION, SOLUTION INTRAVENOUS; SUBCUTANEOUS
Status: DISCONTINUED | OUTPATIENT
Start: 2025-02-27 | End: 2025-02-28 | Stop reason: HOSPADM

## 2025-02-27 RX ORDER — TALC
6 POWDER (GRAM) TOPICAL NIGHTLY PRN
Status: DISCONTINUED | OUTPATIENT
Start: 2025-02-27 | End: 2025-02-27

## 2025-02-27 RX ORDER — POLYETHYLENE GLYCOL 3350 17 G/17G
17 POWDER, FOR SOLUTION ORAL DAILY
Status: DISCONTINUED | OUTPATIENT
Start: 2025-02-27 | End: 2025-02-27

## 2025-02-27 RX ORDER — MAGNESIUM SULFATE HEPTAHYDRATE 40 MG/ML
2 INJECTION, SOLUTION INTRAVENOUS ONCE
Status: COMPLETED | OUTPATIENT
Start: 2025-02-27 | End: 2025-02-27

## 2025-02-27 RX ORDER — ACETAMINOPHEN 325 MG/1
650 TABLET ORAL EVERY 4 HOURS PRN
Status: DISCONTINUED | OUTPATIENT
Start: 2025-02-27 | End: 2025-02-28 | Stop reason: HOSPADM

## 2025-02-27 RX ORDER — IBUPROFEN 200 MG
24 TABLET ORAL
Status: DISCONTINUED | OUTPATIENT
Start: 2025-02-27 | End: 2025-02-28 | Stop reason: HOSPADM

## 2025-02-27 RX ORDER — NALOXONE HCL 0.4 MG/ML
0.02 VIAL (ML) INJECTION
Status: DISCONTINUED | OUTPATIENT
Start: 2025-02-27 | End: 2025-02-27

## 2025-02-27 RX ORDER — INSULIN GLARGINE 100 [IU]/ML
16 INJECTION, SOLUTION SUBCUTANEOUS ONCE
Status: COMPLETED | OUTPATIENT
Start: 2025-02-27 | End: 2025-02-27

## 2025-02-27 RX ORDER — GLUCAGON 1 MG
1 KIT INJECTION
Status: DISCONTINUED | OUTPATIENT
Start: 2025-02-27 | End: 2025-02-28 | Stop reason: HOSPADM

## 2025-02-27 RX ORDER — SODIUM CHLORIDE 0.9 % (FLUSH) 0.9 %
10 SYRINGE (ML) INJECTION EVERY 12 HOURS PRN
Status: DISCONTINUED | OUTPATIENT
Start: 2025-02-27 | End: 2025-02-28 | Stop reason: HOSPADM

## 2025-02-27 RX ORDER — IBUPROFEN 200 MG
16 TABLET ORAL
Status: DISCONTINUED | OUTPATIENT
Start: 2025-02-27 | End: 2025-02-27

## 2025-02-27 RX ORDER — IBUPROFEN 200 MG
24 TABLET ORAL
Status: DISCONTINUED | OUTPATIENT
Start: 2025-02-27 | End: 2025-02-27

## 2025-02-27 RX ORDER — ACETAMINOPHEN 325 MG/1
650 TABLET ORAL EVERY 8 HOURS PRN
Status: DISCONTINUED | OUTPATIENT
Start: 2025-02-27 | End: 2025-02-28 | Stop reason: HOSPADM

## 2025-02-27 RX ORDER — MAGNESIUM SULFATE HEPTAHYDRATE 40 MG/ML
2 INJECTION, SOLUTION INTRAVENOUS ONCE
Status: DISCONTINUED | OUTPATIENT
Start: 2025-02-27 | End: 2025-02-27

## 2025-02-27 RX ORDER — INSULIN ASPART 100 [IU]/ML
0-10 INJECTION, SOLUTION INTRAVENOUS; SUBCUTANEOUS
Status: DISCONTINUED | OUTPATIENT
Start: 2025-02-27 | End: 2025-02-27

## 2025-02-27 RX ORDER — GLUCAGON 1 MG
1 KIT INJECTION
Status: DISCONTINUED | OUTPATIENT
Start: 2025-02-27 | End: 2025-02-27

## 2025-02-27 RX ORDER — IPRATROPIUM BROMIDE AND ALBUTEROL SULFATE 2.5; .5 MG/3ML; MG/3ML
3 SOLUTION RESPIRATORY (INHALATION)
Status: DISCONTINUED | OUTPATIENT
Start: 2025-02-27 | End: 2025-02-27

## 2025-02-27 RX ORDER — IPRATROPIUM BROMIDE AND ALBUTEROL SULFATE 2.5; .5 MG/3ML; MG/3ML
3 SOLUTION RESPIRATORY (INHALATION) EVERY 6 HOURS PRN
Status: DISCONTINUED | OUTPATIENT
Start: 2025-02-27 | End: 2025-02-28 | Stop reason: HOSPADM

## 2025-02-27 RX ORDER — ONDANSETRON HYDROCHLORIDE 2 MG/ML
4 INJECTION, SOLUTION INTRAVENOUS EVERY 8 HOURS PRN
Status: DISCONTINUED | OUTPATIENT
Start: 2025-02-27 | End: 2025-02-27

## 2025-02-27 RX ORDER — FUROSEMIDE 10 MG/ML
40 INJECTION INTRAMUSCULAR; INTRAVENOUS DAILY
Status: DISCONTINUED | OUTPATIENT
Start: 2025-02-28 | End: 2025-02-28 | Stop reason: HOSPADM

## 2025-02-27 RX ORDER — SIMETHICONE 80 MG
1 TABLET,CHEWABLE ORAL 4 TIMES DAILY PRN
Status: DISCONTINUED | OUTPATIENT
Start: 2025-02-27 | End: 2025-02-27

## 2025-02-27 RX ORDER — PROCHLORPERAZINE EDISYLATE 5 MG/ML
5 INJECTION INTRAMUSCULAR; INTRAVENOUS EVERY 6 HOURS PRN
Status: DISCONTINUED | OUTPATIENT
Start: 2025-02-27 | End: 2025-02-28 | Stop reason: HOSPADM

## 2025-02-27 RX ADMIN — ARFORMOTEROL TARTRATE 15 MCG: 15 SOLUTION RESPIRATORY (INHALATION) at 08:02

## 2025-02-27 RX ADMIN — CLOPIDOGREL BISULFATE 75 MG: 75 TABLET ORAL at 06:02

## 2025-02-27 RX ADMIN — TRAZODONE HYDROCHLORIDE 100 MG: 100 TABLET ORAL at 09:02

## 2025-02-27 RX ADMIN — BUDESONIDE INHALATION 0.5 MG: 0.5 SUSPENSION RESPIRATORY (INHALATION) at 08:02

## 2025-02-27 RX ADMIN — ATORVASTATIN CALCIUM 40 MG: 40 TABLET, FILM COATED ORAL at 09:02

## 2025-02-27 RX ADMIN — MAGNESIUM SULFATE HEPTAHYDRATE 2 G: 40 INJECTION, SOLUTION INTRAVENOUS at 06:02

## 2025-02-27 RX ADMIN — INSULIN GLARGINE 16 UNITS: 100 INJECTION, SOLUTION SUBCUTANEOUS at 01:02

## 2025-02-27 RX ADMIN — DOXYCYCLINE 100 MG: 100 INJECTION, POWDER, LYOPHILIZED, FOR SOLUTION INTRAVENOUS at 01:02

## 2025-02-27 RX ADMIN — ENOXAPARIN SODIUM 40 MG: 40 INJECTION SUBCUTANEOUS at 05:02

## 2025-02-27 RX ADMIN — TAMSULOSIN HYDROCHLORIDE 0.4 MG: 0.4 CAPSULE ORAL at 09:02

## 2025-02-27 RX ADMIN — METOPROLOL SUCCINATE 25 MG: 25 TABLET, EXTENDED RELEASE ORAL at 09:02

## 2025-02-27 RX ADMIN — ARFORMOTEROL TARTRATE 15 MCG: 15 SOLUTION RESPIRATORY (INHALATION) at 07:02

## 2025-02-27 RX ADMIN — INSULIN ASPART 6 UNITS: 100 INJECTION, SOLUTION INTRAVENOUS; SUBCUTANEOUS at 06:02

## 2025-02-27 RX ADMIN — FLUTICASONE PROPIONATE 50 MCG: 50 SPRAY, METERED NASAL at 09:02

## 2025-02-27 RX ADMIN — CEFTRIAXONE SODIUM 2 G: 2 INJECTION, POWDER, FOR SOLUTION INTRAMUSCULAR; INTRAVENOUS at 12:02

## 2025-02-27 RX ADMIN — IPRATROPIUM BROMIDE AND ALBUTEROL SULFATE 3 ML: 2.5; .5 SOLUTION RESPIRATORY (INHALATION) at 01:02

## 2025-02-27 RX ADMIN — FUROSEMIDE 40 MG: 10 INJECTION, SOLUTION INTRAMUSCULAR; INTRAVENOUS at 10:02

## 2025-02-27 RX ADMIN — BUDESONIDE INHALATION 0.5 MG: 0.5 SUSPENSION RESPIRATORY (INHALATION) at 07:02

## 2025-02-27 RX ADMIN — ESCITALOPRAM OXALATE 20 MG: 10 TABLET ORAL at 06:02

## 2025-02-27 RX ADMIN — ASPIRIN 81 MG CHEWABLE TABLET 81 MG: 81 TABLET CHEWABLE at 09:02

## 2025-02-27 RX ADMIN — POLYETHYLENE GLYCOL 3350 17 G: 17 POWDER, FOR SOLUTION ORAL at 09:02

## 2025-02-27 RX ADMIN — INSULIN ASPART 3 UNITS: 100 INJECTION, SOLUTION INTRAVENOUS; SUBCUTANEOUS at 09:02

## 2025-02-27 RX ADMIN — IPRATROPIUM BROMIDE AND ALBUTEROL SULFATE 3 ML: 2.5; .5 SOLUTION RESPIRATORY (INHALATION) at 08:02

## 2025-02-27 RX ADMIN — IPRATROPIUM BROMIDE 2 SPRAY: 42 SPRAY, METERED NASAL at 09:02

## 2025-02-27 RX ADMIN — PANTOPRAZOLE SODIUM 40 MG: 40 TABLET, DELAYED RELEASE ORAL at 09:02

## 2025-02-27 RX ADMIN — MAGNESIUM SULFATE HEPTAHYDRATE 2 G: 40 INJECTION, SOLUTION INTRAVENOUS at 01:02

## 2025-02-27 RX ADMIN — LOSARTAN POTASSIUM 50 MG: 50 TABLET, FILM COATED ORAL at 09:02

## 2025-02-27 RX ADMIN — METHYLPREDNISOLONE SODIUM SUCCINATE 60 MG: 40 INJECTION, POWDER, FOR SOLUTION INTRAMUSCULAR; INTRAVENOUS at 06:02

## 2025-02-27 RX ADMIN — METHYLPREDNISOLONE SODIUM SUCCINATE 60 MG: 40 INJECTION, POWDER, FOR SOLUTION INTRAMUSCULAR; INTRAVENOUS at 02:02

## 2025-02-27 NOTE — ASSESSMENT & PLAN NOTE
"Patient's FSGs are controlled on current medication regimen.  Last A1c reviewed-   Lab Results   Component Value Date    HGBA1C 7.6 (H) 03/18/2024     Most recent fingerstick glucose reviewed- No results for input(s): "POCTGLUCOSE" in the last 24 hours.  Current correctional scale  Medium  Maintain anti-hyperglycemic dose as follows-   Antihyperglycemics (From admission, onward)      Start     Stop Route Frequency Ordered    02/26/25 2027  insulin aspart U-100 pen 0-5 Units         -- SubQ Before meals & nightly PRN 02/26/25 1930          Hold Oral hypoglycemics while patient is in the hospital.     "

## 2025-02-27 NOTE — H&P
[facility]  History & Physical    Patient Name: Stone Reveles  MRN: [id number]  Patient Class: IP- Inpatient  Admission Date: 2/26/2025  Attending Physician: Sarah Akers MD   Primary Care Provider: Nessa Thorpe MD         Patient information was obtained from patient, past medical records, and ER records.     Subjective:     Principal Problem:Pneumonia    Chief Complaint:   Chief Complaint   Patient presents with    Shortness of Breath     [facility] reports SOB x 2 weeks that has progressively gotten worse. RA sats 80's on EMS arrival.         HPI: 84 y.o. male patient with comorbid conditions of interstitial lung disease, DM, COPD, HTN, and chronic respiratory failure with hypoxia. To ED with c/o of worsening dyspnea over the past few weeks. Pt initially was seen by pulmonary on the 18th for the same complaint where he was prescribed prednisone, with no improvement. Associated sxs include chills and productive cough (blood tinged sputum). Patient denies any fever. Prior Tx includes 125mg Solumedrol by EMS PTA.  No further complaints or concerns at this time.     ED course: CBC WBC 14.17, hgb- 13.9. BMP unremarkable, . Negative troponin. Elevated lactic acid- 3.1. ABG with PaO2- 51. CXR- Interval development of increased density throughout the mid lower right lung most consistent with early pneumonia. Echo pending. Admitted to Hospital Medicine for pneumonia, hemoptysis, and worsening SOB.    Past Medical History:   Diagnosis Date    Chronic hypertension     Chronic respiratory failure with hypoxia, on home O2 therapy     COPD, very severe     Diabetes mellitus     ILD (interstitial lung disease)        Past Surgical History:   Procedure Laterality Date    INSERTION OF PACEMAKER         Review of patient's allergies indicates:   Allergen Reactions    Amoxicillin-pot clavulanate Hives    Baclofen Other (See Comments)     Hallucinations        No current  facility-administered medications on file prior to encounter.     Current Outpatient Medications on File Prior to Encounter   Medication Sig    albuterol (PROVENTIL) 2.5 mg /3 mL (0.083 %) nebulizer solution Take 3 mLs (2.5 mg total) by nebulization every 4 (four) hours as needed for Wheezing or Shortness of Breath.    albuterol (VENTOLIN HFA) 90 mcg/actuation inhaler Inhale 2 puffs into the lungs every 4 (four) hours as needed for Wheezing or Shortness of Breath (cough).    aspirin 81 MG Chew Take 81 mg by mouth once daily.    atorvastatin (LIPITOR) 40 MG tablet Take 40 mg by mouth once daily.    clopidogreL (PLAVIX) 75 mg tablet Take 1 tablet by mouth every morning.    EScitalopram oxalate (LEXAPRO) 20 MG tablet Take 1 tablet by mouth every morning.    fluticasone (FLONASE) 50 mcg/actuation nasal spray 1 spray by Each Nare route once daily.    ipratropium (ATROVENT) 42 mcg (0.06 %) nasal spray 2 sprays by Each Nostril route 2 (two) times daily.    levocetirizine (XYZAL) 5 MG tablet Take 1 tablet (5 mg total) by mouth every evening.    losartan (COZAAR) 50 MG tablet Take 1 tablet by mouth once daily.    metformin (GLUCOPHAGE) 500 MG tablet Take 500 mg by mouth every evening.    metoprolol succinate (TOPROL-XL) 25 MG 24 hr tablet Take 25 mg by mouth 2 (two) times daily.    pantoprazole (PROTONIX) 40 MG tablet Take 1 tablet by mouth once daily.    predniSONE (DELTASONE) 20 MG tablet Take 0.5 tablets (10 mg total) by mouth 2 (two) times daily. (Patient taking differently: Take 10 mg by mouth once daily.)    tamsulosin (FLOMAX) 0.4 mg Cap Take 1 capsule by mouth every evening.    traZODone (DESYREL) 50 MG tablet Take 100 mg by mouth every evening.    TRELEGY ELLIPTA 200-62.5-25 mcg inhaler INHALE 1 PUFF BY MOUTH INTO THE LUNGS ONCE DAILY.    [DISCONTINUED] azithromycin (Z-CHARLENE) 250 MG tablet Take 2 tablets by mouth on day 1; Take 1 tablet by mouth on days 2-5    [DISCONTINUED] brimonidine 0.2% (ALPHAGAN) 0.2 % Drop  Place into both eyes.    [DISCONTINUED] chlorpheniramine (CHLOR-TRIMETON) 4 mg tablet Take 4 mg by mouth every 4 (four) hours as needed.    [DISCONTINUED] cycloSPORINE (RESTASIS) 0.05 % ophthalmic emulsion Place 1 drop into both eyes 2 (two) times daily.    [DISCONTINUED] dorzolamide (TRUSOPT) 2 % ophthalmic solution Place into both eyes.    [DISCONTINUED] empagliflozin (JARDIANCE) 10 mg tablet Take 10 mg by mouth once daily.    [DISCONTINUED] escitalopram oxalate (LEXAPRO) 20 MG tablet Take 20 mg by mouth once daily.    [DISCONTINUED] HYDROcodone-acetaminophen (NORCO) 5-325 mg per tablet     [DISCONTINUED] losartan (COZAAR) 50 MG tablet     [DISCONTINUED] meloxicam (MOBIC) 15 MG tablet     [DISCONTINUED] metoprolol tartrate (LOPRESSOR) 25 MG tablet     [DISCONTINUED] metoprolol tartrate (LOPRESSOR) 25 MG tablet Take 1 tablet by mouth 2 (two) times daily.    [DISCONTINUED] metoprolol tartrate (LOPRESSOR) 50 MG tablet Take 50 mg by mouth 2 (two) times daily.    [DISCONTINUED] mupirocin (BACTROBAN) 2 % ointment Apply topically 3 (three) times daily.    [DISCONTINUED] pantoprazole (PROTONIX) 40 MG tablet Take 40 mg by mouth once daily.    [DISCONTINUED] prednisoLONE acetate (PRED FORTE) 1 % DrpS Place into both eyes.    [DISCONTINUED] sildenafiL (VIAGRA) 100 MG tablet Take 100 mg by mouth as needed.    [DISCONTINUED] simvastatin (ZOCOR) 10 MG tablet Take 10 mg by mouth once daily.    [DISCONTINUED] tamsulosin (FLOMAX) 0.4 mg Cp24 Take 0.4 mg by mouth once daily. evening    [DISCONTINUED] ticagrelor (BRILINTA) 90 mg tablet Take 90 mg by mouth.    [DISCONTINUED] tiZANidine (ZANAFLEX) 2 MG tablet     [DISCONTINUED] tiZANidine (ZANAFLEX) 2 MG tablet Take 1 tablet by mouth 2 (two) times daily as needed.    [DISCONTINUED] traZODone (DESYREL) 50 MG tablet      Family History       Problem Relation (Age of Onset)    Alcohol abuse Brother, Brother    Asbestos Brother    Diabetes Mother    Heart disease Mother    Pneumonia  Brother    Stroke Father    blood disease Brother          Tobacco Use    Smoking status: Former     Current packs/day: 0.00     Average packs/day: 1 pack/day for 56.0 years (56.0 ttl pk-yrs)     Types: Cigarettes     Start date: 1954     Quit date: 2010     Years since quitting: 15.1    Smokeless tobacco: Current     Types: Snuff   Substance and Sexual Activity    Alcohol use: Never    Drug use: No    Sexual activity: Yes     Review of Systems   Constitutional:  Negative for chills and fever.   HENT:  Negative for congestion and rhinorrhea.    Respiratory:  Positive for cough and shortness of breath.    Cardiovascular:  Negative for chest pain, palpitations and leg swelling.   Gastrointestinal:  Negative for constipation, diarrhea, nausea and vomiting.   Genitourinary:  Negative for difficulty urinating and dysuria.   Musculoskeletal:  Negative for arthralgias, back pain and myalgias.   Neurological:  Negative for dizziness and light-headedness.   Psychiatric/Behavioral:  Negative for sleep disturbance.      Objective:     Vital Signs (Most Recent):  Temp: 96 °F (35.6 °C) (02/26/25 1726)  Pulse: 89 (02/26/25 1726)  Resp: 18 (02/26/25 1726)  BP: (!) 118/57 (02/26/25 1726)  SpO2: (!) 91 % (02/26/25 1726) Vital Signs (24h Range):  Temp:  [96 °F (35.6 °C)-98 °F (36.7 °C)] 96 °F (35.6 °C)  Pulse:  [84-98] 89  Resp:  [18-28] 18  SpO2:  [85 %-96 %] 91 %  BP: (112-161)/(55-90) 118/57     Weight: 96.6 kg (213 lb)  Body mass index is 29.71 kg/m².     Physical Exam  Vitals and nursing note reviewed.   Constitutional:       General: He is not in acute distress.     Appearance: He is not ill-appearing, toxic-appearing or diaphoretic.   HENT:      Head: Normocephalic and atraumatic.   Eyes:      General: No scleral icterus.        Right eye: No discharge.         Left eye: No discharge.   Cardiovascular:      Rate and Rhythm: Normal rate and regular rhythm.      Heart sounds: Normal heart sounds.   Pulmonary:      Effort:  "Pulmonary effort is normal. No respiratory distress.      Breath sounds: Rales present.   Abdominal:      General: Bowel sounds are normal.      Tenderness: There is no abdominal tenderness.   Musculoskeletal:      Cervical back: No rigidity.      Right lower leg: No edema.      Left lower leg: No edema.   Skin:     General: Skin is warm and dry.      Coloration: Skin is not jaundiced.   Neurological:      Mental Status: He is alert and oriented to person, place, and time. Mental status is at baseline.   Psychiatric:         Mood and Affect: Mood normal.         Behavior: Behavior normal.                Significant Labs: All pertinent labs within the past 24 hours have been reviewed.  Blood Culture: No results for input(s): "LABBLOO" in the last 48 hours.  CBC:   Recent Labs   Lab 02/26/25  1133   WBC 14.17*   HGB 13.9*   HCT 43.7        CMP:   Recent Labs   Lab 02/26/25  1133      K 3.7      CO2 26   *   BUN 15   CREATININE 0.9   CALCIUM 8.7   PROT 6.3   ALBUMIN 3.6   BILITOT 1.0   ALKPHOS 107   AST 13   ALT 15   ANIONGAP 12     Coagulation:   Recent Labs   Lab 02/26/25  1133   INR 1.0   APTT 21.7     Lactic Acid:   Recent Labs   Lab 02/26/25  1133   LACTATE 3.1*       Significant Imaging: I have reviewed all pertinent imaging results/findings within the past 24 hours.  Assessment/Plan:     * Pneumonia  Patient has a diagnosis of pneumonia. The cause of the pneumonia is unknown at this time. The pneumonia is stable. The patient has the following signs/symptoms of pneumonia: cough and shortness of breath. The patient does have a current oxygen requirement and the patient does have a home oxygen requirement. I have reviewed the pertinent imaging. The following cultures have been collected: Blood cultures and Sputum culture The culture results are listed below.     Current antimicrobial regimen consists of  Rocephin, doxycyline . Will monitor patient closely and continue current treatment " "plan unchanged.    Antibiotics (From admission, onward)      Start     Stop Route Frequency Ordered    02/27/25 1300  cefTRIAXone injection 2 g         -- IV Daily 02/26/25 1505    02/26/25 1200  doxycycline 100 mg in D5W 100 mL IVPB (MB+)         -- IV Every 12 hours (non-standard times) 02/26/25 1159            Microbiology Results (last 7 days)       Procedure Component Value Units Date/Time    Culture, Respiratory with Gram Stain [[id number]]     Order Status: No result Specimen: Sputum, Expectorated     Influenza A & B by Molecular [[id number]] Collected: 02/26/25 1127    Order Status: Completed Specimen: Nasal Swab Updated: 02/26/25 1159     Influenza A, Molecular Negative     Influenza B, Molecular Negative     Flu A & B Source Nasal swab    Culture, Respiratory with Gram Stain [[id number]] Collected: 02/26/25 1137    Order Status: Sent Specimen: Respiratory from Sputum Updated: 02/26/25 1150    Blood Culture #2 **CANNOT BE ORDERED STAT** [[id number]] Collected: 02/26/25 1134    Order Status: Sent Specimen: Blood from Peripheral, Antecubital, Right     Blood Culture #1 **CANNOT BE ORDERED STAT** [[id number]] Collected: 02/26/25 1133    Order Status: Sent Specimen: Blood from Peripheral, Antecubital, Right             Type 2 diabetes mellitus without complication, without long-term current use of insulin  Patient's FSGs are controlled on current medication regimen.  Last A1c reviewed-   Lab Results   Component Value Date    HGBA1C 7.6 (H) 03/18/2024     Most recent fingerstick glucose reviewed- No results for input(s): "POCTGLUCOSE" in the last 24 hours.  Current correctional scale  Medium  Maintain anti-hyperglycemic dose as follows-   Antihyperglycemics (From admission, onward)      Start     Stop Route Frequency Ordered    02/26/25 2027  insulin aspart U-100 pen 0-5 Units         -- SubQ Before meals & nightly PRN 02/26/25 1930          Hold Oral hypoglycemics while patient is in the hospital.       COPD, " moderate  Patient's COPD is controlled currently.  Patient is currently off COPD Pathway. Continue scheduled inhalers Steroids, Antibiotics, and Supplemental oxygen and monitor respiratory status closely.     Gastroesophageal reflux disease  PPI        VTE Risk Mitigation (From admission, onward)           Ordered     enoxaparin injection 40 mg  Daily         02/26/25 1930     IP VTE HIGH RISK PATIENT  Once         02/26/25 1930     Place sequential compression device  Until discontinued         02/26/25 1930                                    ANTONIO Cota-C  Department of Hospital Medicine  [facility] - Telemetry ([facility])

## 2025-02-27 NOTE — SUBJECTIVE & OBJECTIVE
Past Medical History:   Diagnosis Date    Chronic hypertension     Chronic respiratory failure with hypoxia, on home O2 therapy     COPD, very severe     Diabetes mellitus     ILD (interstitial lung disease)        Past Surgical History:   Procedure Laterality Date    INSERTION OF PACEMAKER         Review of patient's allergies indicates:   Allergen Reactions    Amoxicillin-pot clavulanate Hives    Baclofen Other (See Comments)     Hallucinations        No current facility-administered medications on file prior to encounter.     Current Outpatient Medications on File Prior to Encounter   Medication Sig    albuterol (PROVENTIL) 2.5 mg /3 mL (0.083 %) nebulizer solution Take 3 mLs (2.5 mg total) by nebulization every 4 (four) hours as needed for Wheezing or Shortness of Breath.    albuterol (VENTOLIN HFA) 90 mcg/actuation inhaler Inhale 2 puffs into the lungs every 4 (four) hours as needed for Wheezing or Shortness of Breath (cough).    aspirin 81 MG Chew Take 81 mg by mouth once daily.    atorvastatin (LIPITOR) 40 MG tablet Take 40 mg by mouth once daily.    clopidogreL (PLAVIX) 75 mg tablet Take 1 tablet by mouth every morning.    EScitalopram oxalate (LEXAPRO) 20 MG tablet Take 1 tablet by mouth every morning.    fluticasone (FLONASE) 50 mcg/actuation nasal spray 1 spray by Each Nare route once daily.    ipratropium (ATROVENT) 42 mcg (0.06 %) nasal spray 2 sprays by Each Nostril route 2 (two) times daily.    levocetirizine (XYZAL) 5 MG tablet Take 1 tablet (5 mg total) by mouth every evening.    losartan (COZAAR) 50 MG tablet Take 1 tablet by mouth once daily.    metformin (GLUCOPHAGE) 500 MG tablet Take 500 mg by mouth every evening.    metoprolol succinate (TOPROL-XL) 25 MG 24 hr tablet Take 25 mg by mouth 2 (two) times daily.    pantoprazole (PROTONIX) 40 MG tablet Take 1 tablet by mouth once daily.    predniSONE (DELTASONE) 20 MG tablet Take 0.5 tablets (10 mg total) by mouth 2 (two) times daily. (Patient  taking differently: Take 10 mg by mouth once daily.)    tamsulosin (FLOMAX) 0.4 mg Cap Take 1 capsule by mouth every evening.    traZODone (DESYREL) 50 MG tablet Take 100 mg by mouth every evening.    TRELEGY ELLIPTA 200-62.5-25 mcg inhaler INHALE 1 PUFF BY MOUTH INTO THE LUNGS ONCE DAILY.    [DISCONTINUED] azithromycin (Z-CHARLENE) 250 MG tablet Take 2 tablets by mouth on day 1; Take 1 tablet by mouth on days 2-5    [DISCONTINUED] brimonidine 0.2% (ALPHAGAN) 0.2 % Drop Place into both eyes.    [DISCONTINUED] chlorpheniramine (CHLOR-TRIMETON) 4 mg tablet Take 4 mg by mouth every 4 (four) hours as needed.    [DISCONTINUED] cycloSPORINE (RESTASIS) 0.05 % ophthalmic emulsion Place 1 drop into both eyes 2 (two) times daily.    [DISCONTINUED] dorzolamide (TRUSOPT) 2 % ophthalmic solution Place into both eyes.    [DISCONTINUED] empagliflozin (JARDIANCE) 10 mg tablet Take 10 mg by mouth once daily.    [DISCONTINUED] escitalopram oxalate (LEXAPRO) 20 MG tablet Take 20 mg by mouth once daily.    [DISCONTINUED] HYDROcodone-acetaminophen (NORCO) 5-325 mg per tablet     [DISCONTINUED] losartan (COZAAR) 50 MG tablet     [DISCONTINUED] meloxicam (MOBIC) 15 MG tablet     [DISCONTINUED] metoprolol tartrate (LOPRESSOR) 25 MG tablet     [DISCONTINUED] metoprolol tartrate (LOPRESSOR) 25 MG tablet Take 1 tablet by mouth 2 (two) times daily.    [DISCONTINUED] metoprolol tartrate (LOPRESSOR) 50 MG tablet Take 50 mg by mouth 2 (two) times daily.    [DISCONTINUED] mupirocin (BACTROBAN) 2 % ointment Apply topically 3 (three) times daily.    [DISCONTINUED] pantoprazole (PROTONIX) 40 MG tablet Take 40 mg by mouth once daily.    [DISCONTINUED] prednisoLONE acetate (PRED FORTE) 1 % DrpS Place into both eyes.    [DISCONTINUED] sildenafiL (VIAGRA) 100 MG tablet Take 100 mg by mouth as needed.    [DISCONTINUED] simvastatin (ZOCOR) 10 MG tablet Take 10 mg by mouth once daily.    [DISCONTINUED] tamsulosin (FLOMAX) 0.4 mg Cp24 Take 0.4 mg by mouth once  daily. evening    [DISCONTINUED] ticagrelor (BRILINTA) 90 mg tablet Take 90 mg by mouth.    [DISCONTINUED] tiZANidine (ZANAFLEX) 2 MG tablet     [DISCONTINUED] tiZANidine (ZANAFLEX) 2 MG tablet Take 1 tablet by mouth 2 (two) times daily as needed.    [DISCONTINUED] traZODone (DESYREL) 50 MG tablet      Family History       Problem Relation (Age of Onset)    Alcohol abuse Brother, Brother    Asbestos Brother    Diabetes Mother    Heart disease Mother    Pneumonia Brother    Stroke Father    blood disease Brother          Tobacco Use    Smoking status: Former     Current packs/day: 0.00     Average packs/day: 1 pack/day for 56.0 years (56.0 ttl pk-yrs)     Types: Cigarettes     Start date: 1954     Quit date: 2010     Years since quitting: 15.1    Smokeless tobacco: Current     Types: Snuff   Substance and Sexual Activity    Alcohol use: Never    Drug use: No    Sexual activity: Yes     Review of Systems  Objective:     Vital Signs (Most Recent):  Temp: 96 °F (35.6 °C) (02/26/25 1726)  Pulse: 89 (02/26/25 1726)  Resp: 18 (02/26/25 1726)  BP: (!) 118/57 (02/26/25 1726)  SpO2: (!) 91 % (02/26/25 1726) Vital Signs (24h Range):  Temp:  [96 °F (35.6 °C)-98 °F (36.7 °C)] 96 °F (35.6 °C)  Pulse:  [84-98] 89  Resp:  [18-28] 18  SpO2:  [85 %-96 %] 91 %  BP: (112-161)/(55-90) 118/57     Weight: 96.6 kg (213 lb)  Body mass index is 29.71 kg/m².     Physical Exam           Significant Labs: All pertinent labs within the past 24 hours have been reviewed.  CBC:   Recent Labs   Lab 02/26/25  1133   WBC 14.17*   HGB 13.9*   HCT 43.7        CMP:   Recent Labs   Lab 02/26/25  1133      K 3.7      CO2 26   *   BUN 15   CREATININE 0.9   CALCIUM 8.7   PROT 6.3   ALBUMIN 3.6   BILITOT 1.0   ALKPHOS 107   AST 13   ALT 15   ANIONGAP 12     Coagulation:   Recent Labs   Lab 02/26/25  1133   INR 1.0   APTT 21.7     Lactic Acid:   Recent Labs   Lab 02/26/25  1133   LACTATE 3.1*     X-Ray Chest AP Portable   Final Result       1.  Interval development of increased density throughout the mid lower right lung most consistent with early pneumonia.      2.  Stable findings as noted above.         Electronically signed by: Diogo Valle MD   Date:    02/26/2025   Time:    11:39           Significant Imaging: I have reviewed all pertinent imaging results/findings within the past 24 hours.

## 2025-02-27 NOTE — HOSPITAL COURSE
83 y/o male with comorbid conditions of interstitial lung disease, DM, COPD, HTN, and chronic respiratory failure with hypoxia. To ED with c/o of worsening dyspnea over the past few weeks. No improvement after steroid treatment for a week. Productive cough. Blood clots coughed X4 episodes. CXR indicative of right mid/lower lobe PNA. Elevated lactic acid.    Echo: left ventricle is normal in size, wall thickness, systolic function, and wall motion. Septal motion is consistent with bundle branch block. Ejection fraction is approximately 60%. There is normal diastolic function. The right ventricle is normal in size, wall thickness, and systolic function. There is aortic valve sclerosis with moderate stenosis.     02/27/2025- Appearance and SOB much improved. Repeat lactic acid increased to 3.1 with follow up check at 4.0 and down trend to 3.1.    Patient is discharged home with resumption of home medications. Prescriptions for doxycycline 100 mg tablets, take 1 tablet PO every 12 hours for 5 days and cefdinir 300 mg tablets, take 1 tablet PO BID for 5 days, additionally Insulin glargine injector pen, inject 16 units daily. Tapering dose of prednisone 20 mg PO for 5 days and 10 mg PO for 5 days, then stop. Resume your home steroid dose when complete. Patient instructed to check blood sugar daily. Also instructed to wear oxygen continuously. Follow up recommendations to PCP and Pulmonary clinic included in discharge instructions. I have seen and evaluated this patient and he is medically stable for discharge.

## 2025-02-27 NOTE — ASSESSMENT & PLAN NOTE
Patient has a diagnosis of pneumonia. The cause of the pneumonia is unknown at this time. The pneumonia is stable. The patient has the following signs/symptoms of pneumonia: cough and shortness of breath. The patient does have a current oxygen requirement and the patient does have a home oxygen requirement. I have reviewed the pertinent imaging. The following cultures have been collected: Blood cultures and Sputum culture The culture results are listed below.     Current antimicrobial regimen consists of  Rocephin, doxycyline . Will monitor patient closely and continue current treatment plan unchanged.    Antibiotics (From admission, onward)    Start     Stop Route Frequency Ordered    02/27/25 1300  cefTRIAXone injection 2 g         -- IV Daily 02/26/25 1505    02/26/25 1200  doxycycline 100 mg in D5W 100 mL IVPB (MB+)         -- IV Every 12 hours (non-standard times) 02/26/25 1159          Microbiology Results (last 7 days)     Procedure Component Value Units Date/Time    Culture, Respiratory with Gram Stain [[id number]]     Order Status: Canceled Specimen: Sputum, Expectorated     Blood Culture #2 **CANNOT BE ORDERED STAT** [[id number]] Collected: 02/26/25 1134    Order Status: Completed Specimen: Blood from Peripheral, Antecubital, Right Updated: 02/27/25 0545     Blood Culture, Routine No Growth to date    Blood Culture #1 **CANNOT BE ORDERED STAT** [[id number]] Collected: 02/26/25 1133    Order Status: Completed Specimen: Blood from Peripheral, Antecubital, Right Updated: 02/27/25 0545     Blood Culture, Routine No Growth to date    Culture, Respiratory with Gram Stain [[id number]] Collected: 02/26/25 1137    Order Status: Completed Specimen: Respiratory from Sputum Updated: 02/27/25 0520     Gram Stain (Respiratory) <10 epithelial cells per low power field.     Gram Stain (Respiratory) Few WBC's     Gram Stain (Respiratory) Few Gram positive cocci     Gram Stain (Respiratory) Rare Gram negative rods     Culture, Respiratory with Gram Stain [[id number]]     Order Status: No result Specimen: Sputum, Expectorated     Influenza A & B by Molecular [[id number]] Collected: 02/26/25 1127    Order Status: Completed Specimen: Nasal Swab Updated: 02/26/25 1159     Influenza A, Molecular Negative     Influenza B, Molecular Negative     Flu A & B Source Nasal swab

## 2025-02-27 NOTE — H&P
[facility] Medicine  History & Physical    Patient Name: Stone Reveles  MRN: [id number]  Patient Class: IP- Inpatient  Admission Date: 2/26/2025  Attending Physician: Sarah Akers MD   Primary Care Provider: Nessa Thorpe MD         Patient information was obtained from patient, past medical records, and ER records.     Subjective:     Principal Problem:<principal problem not specified>    Chief Complaint:   Chief Complaint   Patient presents with    Shortness of Breath     [facility] reports SOB x 2 weeks that has progressively gotten worse. RA sats 80's on EMS arrival.         HPI: 84 y.o. male patient with comorbid conditions of interstitial lung disease, DM, COPD, HTN, and chronic respiratory failure with hypoxia. To ED with c/o of worsening dyspnea over the past few weeks. Pt initially was seen by pulmonary on the 18th for the same complaint where he was prescribed prednisone, with no improvement. Associated sxs include chills and productive cough (blood tinged sputum). Patient denies any fever. Prior Tx includes 125mg Solumedrol by EMS PTA.  No further complaints or concerns at this time.     ED course: CBC WBC 14.17, hgb- 13.9. BMP unremarkable, . Negative troponin. Elevated lactic acid- 3.1. ABG with PaO2- 51. CXR- Interval development of increased density throughout the mid lower right lung most consistent with early pneumonia. Echo pending. Admitted to Hospital Medicine for pneumonia, hemoptysis, and worsening SOB.    Past Medical History:   Diagnosis Date    Chronic hypertension     Chronic respiratory failure with hypoxia, on home O2 therapy     COPD, very severe     Diabetes mellitus     ILD (interstitial lung disease)        Past Surgical History:   Procedure Laterality Date    INSERTION OF PACEMAKER         Review of patient's allergies indicates:   Allergen Reactions    Amoxicillin-pot clavulanate Hives    Baclofen Other (See Comments)      Hallucinations        No current facility-administered medications on file prior to encounter.     Current Outpatient Medications on File Prior to Encounter   Medication Sig    albuterol (PROVENTIL) 2.5 mg /3 mL (0.083 %) nebulizer solution Take 3 mLs (2.5 mg total) by nebulization every 4 (four) hours as needed for Wheezing or Shortness of Breath.    albuterol (VENTOLIN HFA) 90 mcg/actuation inhaler Inhale 2 puffs into the lungs every 4 (four) hours as needed for Wheezing or Shortness of Breath (cough).    aspirin 81 MG Chew Take 81 mg by mouth once daily.    atorvastatin (LIPITOR) 40 MG tablet Take 40 mg by mouth once daily.    clopidogreL (PLAVIX) 75 mg tablet Take 1 tablet by mouth every morning.    EScitalopram oxalate (LEXAPRO) 20 MG tablet Take 1 tablet by mouth every morning.    fluticasone (FLONASE) 50 mcg/actuation nasal spray 1 spray by Each Nare route once daily.    ipratropium (ATROVENT) 42 mcg (0.06 %) nasal spray 2 sprays by Each Nostril route 2 (two) times daily.    levocetirizine (XYZAL) 5 MG tablet Take 1 tablet (5 mg total) by mouth every evening.    losartan (COZAAR) 50 MG tablet Take 1 tablet by mouth once daily.    metformin (GLUCOPHAGE) 500 MG tablet Take 500 mg by mouth every evening.    metoprolol succinate (TOPROL-XL) 25 MG 24 hr tablet Take 25 mg by mouth 2 (two) times daily.    pantoprazole (PROTONIX) 40 MG tablet Take 1 tablet by mouth once daily.    predniSONE (DELTASONE) 20 MG tablet Take 0.5 tablets (10 mg total) by mouth 2 (two) times daily. (Patient taking differently: Take 10 mg by mouth once daily.)    tamsulosin (FLOMAX) 0.4 mg Cap Take 1 capsule by mouth every evening.    traZODone (DESYREL) 50 MG tablet Take 100 mg by mouth every evening.    TRELEGY ELLIPTA 200-62.5-25 mcg inhaler INHALE 1 PUFF BY MOUTH INTO THE LUNGS ONCE DAILY.    [DISCONTINUED] azithromycin (Z-CHARLENE) 250 MG tablet Take 2 tablets by mouth on day 1; Take 1 tablet by mouth on days 2-5    [DISCONTINUED]  brimonidine 0.2% (ALPHAGAN) 0.2 % Drop Place into both eyes.    [DISCONTINUED] chlorpheniramine (CHLOR-TRIMETON) 4 mg tablet Take 4 mg by mouth every 4 (four) hours as needed.    [DISCONTINUED] cycloSPORINE (RESTASIS) 0.05 % ophthalmic emulsion Place 1 drop into both eyes 2 (two) times daily.    [DISCONTINUED] dorzolamide (TRUSOPT) 2 % ophthalmic solution Place into both eyes.    [DISCONTINUED] empagliflozin (JARDIANCE) 10 mg tablet Take 10 mg by mouth once daily.    [DISCONTINUED] escitalopram oxalate (LEXAPRO) 20 MG tablet Take 20 mg by mouth once daily.    [DISCONTINUED] HYDROcodone-acetaminophen (NORCO) 5-325 mg per tablet     [DISCONTINUED] losartan (COZAAR) 50 MG tablet     [DISCONTINUED] meloxicam (MOBIC) 15 MG tablet     [DISCONTINUED] metoprolol tartrate (LOPRESSOR) 25 MG tablet     [DISCONTINUED] metoprolol tartrate (LOPRESSOR) 25 MG tablet Take 1 tablet by mouth 2 (two) times daily.    [DISCONTINUED] metoprolol tartrate (LOPRESSOR) 50 MG tablet Take 50 mg by mouth 2 (two) times daily.    [DISCONTINUED] mupirocin (BACTROBAN) 2 % ointment Apply topically 3 (three) times daily.    [DISCONTINUED] pantoprazole (PROTONIX) 40 MG tablet Take 40 mg by mouth once daily.    [DISCONTINUED] prednisoLONE acetate (PRED FORTE) 1 % DrpS Place into both eyes.    [DISCONTINUED] sildenafiL (VIAGRA) 100 MG tablet Take 100 mg by mouth as needed.    [DISCONTINUED] simvastatin (ZOCOR) 10 MG tablet Take 10 mg by mouth once daily.    [DISCONTINUED] tamsulosin (FLOMAX) 0.4 mg Cp24 Take 0.4 mg by mouth once daily. evening    [DISCONTINUED] ticagrelor (BRILINTA) 90 mg tablet Take 90 mg by mouth.    [DISCONTINUED] tiZANidine (ZANAFLEX) 2 MG tablet     [DISCONTINUED] tiZANidine (ZANAFLEX) 2 MG tablet Take 1 tablet by mouth 2 (two) times daily as needed.    [DISCONTINUED] traZODone (DESYREL) 50 MG tablet      Family History       Problem Relation (Age of Onset)    Alcohol abuse Brother, Brother    Asbestos Brother    Diabetes Mother     Heart disease Mother    Pneumonia Brother    Stroke Father    blood disease Brother          Tobacco Use    Smoking status: Former     Current packs/day: 0.00     Average packs/day: 1 pack/day for 56.0 years (56.0 ttl pk-yrs)     Types: Cigarettes     Start date: 1954     Quit date: 2010     Years since quitting: 15.1    Smokeless tobacco: Current     Types: Snuff   Substance and Sexual Activity    Alcohol use: Never    Drug use: No    Sexual activity: Yes     Review of Systems   Constitutional:  Negative for chills and fever.   HENT:  Negative for congestion and rhinorrhea.    Respiratory:  Positive for cough and shortness of breath.    Cardiovascular:  Negative for chest pain, palpitations and leg swelling.   Gastrointestinal:  Negative for constipation, diarrhea, nausea and vomiting.   Genitourinary:  Negative for difficulty urinating and dysuria.   Musculoskeletal:  Negative for arthralgias, back pain and myalgias.   Neurological:  Negative for dizziness and light-headedness.   Psychiatric/Behavioral:  Negative for sleep disturbance.      Objective:     Vital Signs (Most Recent):  Temp: 96 °F (35.6 °C) (02/26/25 1726)  Pulse: 89 (02/26/25 1726)  Resp: 18 (02/26/25 1726)  BP: (!) 118/57 (02/26/25 1726)  SpO2: (!) 91 % (02/26/25 1726) Vital Signs (24h Range):  Temp:  [96 °F (35.6 °C)-98 °F (36.7 °C)] 96 °F (35.6 °C)  Pulse:  [84-98] 89  Resp:  [18-28] 18  SpO2:  [85 %-96 %] 91 %  BP: (112-161)/(55-90) 118/57     Weight: 96.6 kg (213 lb)  Body mass index is 29.71 kg/m².     Physical Exam  Vitals and nursing note reviewed.   Constitutional:       General: He is not in acute distress.     Appearance: He is not ill-appearing, toxic-appearing or diaphoretic.   HENT:      Head: Normocephalic and atraumatic.   Eyes:      General: No scleral icterus.        Right eye: No discharge.         Left eye: No discharge.   Cardiovascular:      Rate and Rhythm: Normal rate and regular rhythm.      Heart sounds: Normal heart  sounds.   Pulmonary:      Effort: Pulmonary effort is normal. No respiratory distress.      Breath sounds: Rales present.   Abdominal:      General: Bowel sounds are normal.      Tenderness: There is no abdominal tenderness.   Musculoskeletal:      Cervical back: No rigidity.      Right lower leg: No edema.      Left lower leg: No edema.   Skin:     General: Skin is warm and dry.      Coloration: Skin is not jaundiced.   Neurological:      Mental Status: He is alert and oriented to person, place, and time. Mental status is at baseline.   Psychiatric:         Mood and Affect: Mood normal.         Behavior: Behavior normal.                Significant Labs: All pertinent labs within the past 24 hours have been reviewed.  CBC:   Recent Labs   Lab 02/26/25  1133   WBC 14.17*   HGB 13.9*   HCT 43.7        CMP:   Recent Labs   Lab 02/26/25  1133      K 3.7      CO2 26   *   BUN 15   CREATININE 0.9   CALCIUM 8.7   PROT 6.3   ALBUMIN 3.6   BILITOT 1.0   ALKPHOS 107   AST 13   ALT 15   ANIONGAP 12     Coagulation:   Recent Labs   Lab 02/26/25  1133   INR 1.0   APTT 21.7     Lactic Acid:   Recent Labs   Lab 02/26/25  1133   LACTATE 3.1*       Significant Imaging: I have reviewed all pertinent imaging results/findings within the past 24 hours.  Assessment/Plan:     No notes have been filed under this hospital service.  Service: Hospital Medicine    VTE Risk Mitigation (From admission, onward)           Ordered     enoxaparin injection 40 mg  Daily         02/26/25 1930     IP VTE HIGH RISK PATIENT  Once         02/26/25 1930     Place sequential compression device  Until discontinued         02/26/25 1930                                    ANTONIO Cota-C  Department of Hospital Medicine  [facility] - Telemetry ([facility])

## 2025-02-27 NOTE — ASSESSMENT & PLAN NOTE
Patient has a diagnosis of pneumonia. The cause of the pneumonia is unknown at this time. The pneumonia is stable. The patient has the following signs/symptoms of pneumonia: cough and shortness of breath. The patient does have a current oxygen requirement and the patient does have a home oxygen requirement. I have reviewed the pertinent imaging. The following cultures have been collected: Blood cultures and Sputum culture The culture results are listed below.     Current antimicrobial regimen consists of  Rocephin, doxycyline . Will monitor patient closely and continue current treatment plan unchanged.    Antibiotics (From admission, onward)      Start     Stop Route Frequency Ordered    02/27/25 1300  cefTRIAXone injection 2 g         -- IV Daily 02/26/25 1505    02/26/25 1200  doxycycline 100 mg in D5W 100 mL IVPB (MB+)         -- IV Every 12 hours (non-standard times) 02/26/25 1159            Microbiology Results (last 7 days)       Procedure Component Value Units Date/Time    Culture, Respiratory with Gram Stain [[id number]]     Order Status: No result Specimen: Sputum, Expectorated     Influenza A & B by Molecular [[id number]] Collected: 02/26/25 1127    Order Status: Completed Specimen: Nasal Swab Updated: 02/26/25 1159     Influenza A, Molecular Negative     Influenza B, Molecular Negative     Flu A & B Source Nasal swab    Culture, Respiratory with Gram Stain [[id number]] Collected: 02/26/25 1137    Order Status: Sent Specimen: Respiratory from Sputum Updated: 02/26/25 1150    Blood Culture #2 **CANNOT BE ORDERED STAT** [[id number]] Collected: 02/26/25 1134    Order Status: Sent Specimen: Blood from Peripheral, Antecubital, Right     Blood Culture #1 **CANNOT BE ORDERED STAT** [[id number]] Collected: 02/26/25 1133    Order Status: Sent Specimen: Blood from Peripheral, Antecubital, Right

## 2025-02-27 NOTE — ASSESSMENT & PLAN NOTE
Patient's FSGs are controlled on current medication regimen.  Last A1c reviewed-   Lab Results   Component Value Date    HGBA1C 6.3 (H) 02/26/2025     Most recent fingerstick glucose reviewed-   Recent Labs   Lab 02/26/25 2148 02/27/25  1158   POCTGLUCOSE 355* 292*     Current correctional scale  Medium  Maintain anti-hyperglycemic dose as follows-   Antihyperglycemics (From admission, onward)    None        Hold Oral hypoglycemics while patient is in the hospital.

## 2025-02-27 NOTE — SUBJECTIVE & OBJECTIVE
Past Medical History:   Diagnosis Date    Chronic hypertension     Chronic respiratory failure with hypoxia, on home O2 therapy     COPD, very severe     Diabetes mellitus     ILD (interstitial lung disease)        Past Surgical History:   Procedure Laterality Date    INSERTION OF PACEMAKER         Review of patient's allergies indicates:   Allergen Reactions    Amoxicillin-pot clavulanate Hives    Baclofen Other (See Comments)     Hallucinations        No current facility-administered medications on file prior to encounter.     Current Outpatient Medications on File Prior to Encounter   Medication Sig    albuterol (PROVENTIL) 2.5 mg /3 mL (0.083 %) nebulizer solution Take 3 mLs (2.5 mg total) by nebulization every 4 (four) hours as needed for Wheezing or Shortness of Breath.    albuterol (VENTOLIN HFA) 90 mcg/actuation inhaler Inhale 2 puffs into the lungs every 4 (four) hours as needed for Wheezing or Shortness of Breath (cough).    aspirin 81 MG Chew Take 81 mg by mouth once daily.    atorvastatin (LIPITOR) 40 MG tablet Take 40 mg by mouth once daily.    clopidogreL (PLAVIX) 75 mg tablet Take 1 tablet by mouth every morning.    EScitalopram oxalate (LEXAPRO) 20 MG tablet Take 1 tablet by mouth every morning.    fluticasone (FLONASE) 50 mcg/actuation nasal spray 1 spray by Each Nare route once daily.    ipratropium (ATROVENT) 42 mcg (0.06 %) nasal spray 2 sprays by Each Nostril route 2 (two) times daily.    levocetirizine (XYZAL) 5 MG tablet Take 1 tablet (5 mg total) by mouth every evening.    losartan (COZAAR) 50 MG tablet Take 1 tablet by mouth once daily.    metformin (GLUCOPHAGE) 500 MG tablet Take 500 mg by mouth every evening.    metoprolol succinate (TOPROL-XL) 25 MG 24 hr tablet Take 25 mg by mouth 2 (two) times daily.    pantoprazole (PROTONIX) 40 MG tablet Take 1 tablet by mouth once daily.    predniSONE (DELTASONE) 20 MG tablet Take 0.5 tablets (10 mg total) by mouth 2 (two) times daily. (Patient  taking differently: Take 10 mg by mouth once daily.)    tamsulosin (FLOMAX) 0.4 mg Cap Take 1 capsule by mouth every evening.    traZODone (DESYREL) 50 MG tablet Take 100 mg by mouth every evening.    TRELEGY ELLIPTA 200-62.5-25 mcg inhaler INHALE 1 PUFF BY MOUTH INTO THE LUNGS ONCE DAILY.    [DISCONTINUED] azithromycin (Z-CHARLENE) 250 MG tablet Take 2 tablets by mouth on day 1; Take 1 tablet by mouth on days 2-5    [DISCONTINUED] brimonidine 0.2% (ALPHAGAN) 0.2 % Drop Place into both eyes.    [DISCONTINUED] chlorpheniramine (CHLOR-TRIMETON) 4 mg tablet Take 4 mg by mouth every 4 (four) hours as needed.    [DISCONTINUED] cycloSPORINE (RESTASIS) 0.05 % ophthalmic emulsion Place 1 drop into both eyes 2 (two) times daily.    [DISCONTINUED] dorzolamide (TRUSOPT) 2 % ophthalmic solution Place into both eyes.    [DISCONTINUED] empagliflozin (JARDIANCE) 10 mg tablet Take 10 mg by mouth once daily.    [DISCONTINUED] escitalopram oxalate (LEXAPRO) 20 MG tablet Take 20 mg by mouth once daily.    [DISCONTINUED] HYDROcodone-acetaminophen (NORCO) 5-325 mg per tablet     [DISCONTINUED] losartan (COZAAR) 50 MG tablet     [DISCONTINUED] meloxicam (MOBIC) 15 MG tablet     [DISCONTINUED] metoprolol tartrate (LOPRESSOR) 25 MG tablet     [DISCONTINUED] metoprolol tartrate (LOPRESSOR) 25 MG tablet Take 1 tablet by mouth 2 (two) times daily.    [DISCONTINUED] metoprolol tartrate (LOPRESSOR) 50 MG tablet Take 50 mg by mouth 2 (two) times daily.    [DISCONTINUED] mupirocin (BACTROBAN) 2 % ointment Apply topically 3 (three) times daily.    [DISCONTINUED] pantoprazole (PROTONIX) 40 MG tablet Take 40 mg by mouth once daily.    [DISCONTINUED] prednisoLONE acetate (PRED FORTE) 1 % DrpS Place into both eyes.    [DISCONTINUED] sildenafiL (VIAGRA) 100 MG tablet Take 100 mg by mouth as needed.    [DISCONTINUED] simvastatin (ZOCOR) 10 MG tablet Take 10 mg by mouth once daily.    [DISCONTINUED] tamsulosin (FLOMAX) 0.4 mg Cp24 Take 0.4 mg by mouth once  daily. evening    [DISCONTINUED] ticagrelor (BRILINTA) 90 mg tablet Take 90 mg by mouth.    [DISCONTINUED] tiZANidine (ZANAFLEX) 2 MG tablet     [DISCONTINUED] tiZANidine (ZANAFLEX) 2 MG tablet Take 1 tablet by mouth 2 (two) times daily as needed.    [DISCONTINUED] traZODone (DESYREL) 50 MG tablet      Family History       Problem Relation (Age of Onset)    Alcohol abuse Brother, Brother    Asbestos Brother    Diabetes Mother    Heart disease Mother    Pneumonia Brother    Stroke Father    blood disease Brother          Tobacco Use    Smoking status: Former     Current packs/day: 0.00     Average packs/day: 1 pack/day for 56.0 years (56.0 ttl pk-yrs)     Types: Cigarettes     Start date: 1954     Quit date: 2010     Years since quitting: 15.1    Smokeless tobacco: Current     Types: Snuff   Substance and Sexual Activity    Alcohol use: Never    Drug use: No    Sexual activity: Yes     Review of Systems   Constitutional:  Negative for chills and fever.   HENT:  Negative for congestion and rhinorrhea.    Respiratory:  Positive for cough and shortness of breath.    Cardiovascular:  Negative for chest pain, palpitations and leg swelling.   Gastrointestinal:  Negative for constipation, diarrhea, nausea and vomiting.   Genitourinary:  Negative for difficulty urinating and dysuria.   Musculoskeletal:  Negative for arthralgias, back pain and myalgias.   Neurological:  Negative for dizziness and light-headedness.   Psychiatric/Behavioral:  Negative for sleep disturbance.      Objective:     Vital Signs (Most Recent):  Temp: 96 °F (35.6 °C) (02/26/25 1726)  Pulse: 89 (02/26/25 1726)  Resp: 18 (02/26/25 1726)  BP: (!) 118/57 (02/26/25 1726)  SpO2: (!) 91 % (02/26/25 1726) Vital Signs (24h Range):  Temp:  [96 °F (35.6 °C)-98 °F (36.7 °C)] 96 °F (35.6 °C)  Pulse:  [84-98] 89  Resp:  [18-28] 18  SpO2:  [85 %-96 %] 91 %  BP: (112-161)/(55-90) 118/57     Weight: 96.6 kg (213 lb)  Body mass index is 29.71 kg/m².     Physical  "Exam  Vitals and nursing note reviewed.   Constitutional:       General: He is not in acute distress.     Appearance: He is not ill-appearing, toxic-appearing or diaphoretic.   HENT:      Head: Normocephalic and atraumatic.   Eyes:      General: No scleral icterus.        Right eye: No discharge.         Left eye: No discharge.   Cardiovascular:      Rate and Rhythm: Normal rate and regular rhythm.      Heart sounds: Normal heart sounds.   Pulmonary:      Effort: Pulmonary effort is normal. No respiratory distress.      Breath sounds: Rales present.   Abdominal:      General: Bowel sounds are normal.      Tenderness: There is no abdominal tenderness.   Musculoskeletal:      Cervical back: No rigidity.      Right lower leg: No edema.      Left lower leg: No edema.   Skin:     General: Skin is warm and dry.      Coloration: Skin is not jaundiced.   Neurological:      Mental Status: He is alert and oriented to person, place, and time. Mental status is at baseline.   Psychiatric:         Mood and Affect: Mood normal.         Behavior: Behavior normal.                Significant Labs: All pertinent labs within the past 24 hours have been reviewed.  Blood Culture: No results for input(s): "LABBLOO" in the last 48 hours.  CBC:   Recent Labs   Lab 02/26/25  1133   WBC 14.17*   HGB 13.9*   HCT 43.7        CMP:   Recent Labs   Lab 02/26/25  1133      K 3.7      CO2 26   *   BUN 15   CREATININE 0.9   CALCIUM 8.7   PROT 6.3   ALBUMIN 3.6   BILITOT 1.0   ALKPHOS 107   AST 13   ALT 15   ANIONGAP 12     Coagulation:   Recent Labs   Lab 02/26/25  1133   INR 1.0   APTT 21.7     Lactic Acid:   Recent Labs   Lab 02/26/25  1133   LACTATE 3.1*       Significant Imaging: I have reviewed all pertinent imaging results/findings within the past 24 hours.  "

## 2025-02-27 NOTE — PLAN OF CARE
[facility] - Telemetry (Hospital)  Initial Discharge Assessment       Primary Care Provider: Nessa Thorpe MD    Admission Diagnosis: Shortness of breath [R06.02]  ILD (interstitial lung disease) [J84.9]  Acute exacerbation of chronic obstructive pulmonary disease (COPD) [J44.1]  Elevated brain natriuretic peptide (BNP) level [R79.89]  Pneumonia of right lower lobe due to infectious organism [J18.9]  Acute on chronic hypoxic respiratory failure [J96.21]    Admission Date: 2/26/2025  Expected Discharge Date:     Transition of Care Barriers: None    Payor: HU[employer] MANAGED MEDICARE / Plan: HU[employer]NA MEDICARE HMO / Product Type: Capitation /     Extended Emergency Contact Information  Primary Emergency Contact: Tatyana Bedolla  Mobile Phone: [phone]  Relation: Friend  Preferred language: English   needed? No  Secondary Emergency Contact: Rosina Chapa  Address: [address] [place] [address] [id number] [place] [address]  Home Phone: [phone]  Mobile Phone: [phone]  Relation: Daughter    Discharge Plan A: Home with family         [facility] - [place] - [address]  [address]  [place] [address]  Phone: [phone] Fax: [phone]    [facility] - [facility] [address] [facility]  [address] [facility] [address]  Phone: [phone] Fax: [phone]      Initial Assessment (most recent)       Adult Discharge Assessment - 02/27/25 1310          Discharge Assessment    Assessment Type Discharge Planning Assessment     Confirmed/corrected address, phone number and insurance Yes     Confirmed Demographics Correct on Facesheet     Source of Information patient     When was your last doctors appointment? 11/29/24     Communicated TYRA with patient/caregiver Yes     Reason For Admission PNA     People in Home alone     Facility Arrived From: home     Do you expect to return to your current  living situation? Yes     Do you have help at home or someone to help you manage your care at home? Yes     Who are your caregiver(s) and their phone number(s)? jori Joe     Prior to hospitilization cognitive status: Alert/Oriented     Current cognitive status: Alert/Oriented     Walking or Climbing Stairs Difficulty no     Dressing/Bathing Difficulty no     Home Accessibility wheelchair accessible     Home Layout Able to live on 1st floor     Equipment Currently Used at Home oxygen     Readmission within 30 days? No     Patient currently being followed by outpatient case management? No     Do you currently have service(s) that help you manage your care at home? No     Do you take prescription medications? Yes     Do you have prescription coverage? Yes     Coverage MCR     Do you have any problems affording any of your prescribed medications? No     Is the patient taking medications as prescribed? yes     Who is going to help you get home at discharge? Tatyana     How do you get to doctors appointments? car, drives self     Are you on dialysis? No     Do you take coumadin? No     Discharge Plan A Home with family     DME Needed Upon Discharge  none     Discharge Plan discussed with: Patient     Transition of Care Barriers None                   Anticipated DC dispo: home with family   Prior Level of Function: independent with ADLs   People in home: lives alone       Comments:  CM met with patient and daughter at bedside to introduce role and discuss discharge planning. Tatyana, friend, will be help at home and can provide transport at time of discharge. Patient will discharge to Tatyana's home. [facility]. Will need new home O2 eval and new orders of this is changing. Advised patient to have family bring portable concentrator to the bedside day of discharge. Confirmed demographics, insurance, and emergency contacts. CM discharge needs depends on hospital progress. CM  will continue following to assist with other needs. Discharge plan has been determined by review of patient's clinical status, future medical and therapeutic needs, and coverage/benefits for post-acute care in coordination with multidisciplinary team members.

## 2025-02-27 NOTE — SUBJECTIVE & OBJECTIVE
Interval History: States he feels better today. Denies SOB. Noted rales in right upper/lower lung fields. No acute events overnight.    Review of Systems   Constitutional:  Negative for chills and fever.   HENT:  Negative for congestion and rhinorrhea.    Respiratory:  Negative for shortness of breath.    Cardiovascular:  Negative for chest pain, palpitations and leg swelling.   Gastrointestinal:  Negative for constipation, diarrhea, nausea and vomiting.   Genitourinary:  Negative for difficulty urinating and dysuria.   Musculoskeletal:  Negative for arthralgias, back pain and myalgias.   Neurological:  Negative for dizziness and light-headedness.   Psychiatric/Behavioral:  Negative for sleep disturbance.      Objective:     Vital Signs (Most Recent):  Temp: 98.1 °F (36.7 °C) (02/27/25 1237)  Pulse: 82 (02/27/25 1326)  Resp: 18 (02/27/25 1326)  BP: (!) 117/56 (02/27/25 1237)  SpO2: 97 % (02/27/25 1326) Vital Signs (24h Range):  Temp:  [96 °F (35.6 °C)-98.1 °F (36.7 °C)] 98.1 °F (36.7 °C)  Pulse:  [70-93] 82  Resp:  [17-20] 18  SpO2:  [91 %-97 %] 97 %  BP: (117-139)/(55-74) 117/56     Weight: 96.6 kg (213 lb)  Body mass index is 29.71 kg/m².    Intake/Output Summary (Last 24 hours) at 2/27/2025 1444  Last data filed at 2/26/2025 1855  Gross per 24 hour   Intake 240 ml   Output --   Net 240 ml         Physical Exam  Vitals and nursing note reviewed.   Constitutional:       General: He is not in acute distress.     Appearance: He is not ill-appearing, toxic-appearing or diaphoretic.   HENT:      Head: Normocephalic and atraumatic.   Eyes:      General: No scleral icterus.        Right eye: No discharge.         Left eye: No discharge.   Cardiovascular:      Rate and Rhythm: Normal rate and regular rhythm.      Heart sounds: Normal heart sounds.   Pulmonary:      Effort: Pulmonary effort is normal. No respiratory distress.      Breath sounds: Rales (right upper and lower) present.   Abdominal:      General: Bowel sounds  are normal.      Tenderness: There is no abdominal tenderness.   Musculoskeletal:      Cervical back: No rigidity.      Right lower leg: No edema.      Left lower leg: No edema.   Skin:     General: Skin is warm and dry.      Coloration: Skin is not jaundiced.   Neurological:      Mental Status: He is alert and oriented to person, place, and time. Mental status is at baseline.   Psychiatric:         Mood and Affect: Mood normal.         Behavior: Behavior normal.             Significant Labs: All pertinent labs within the past 24 hours have been reviewed.  BMP:   Recent Labs   Lab 02/27/25  0458   *      K 4.4      CO2 27   BUN 20   CREATININE 1.0   CALCIUM 9.0   MG 1.7     CBC:   Recent Labs   Lab 02/26/25  1133 02/27/25  0458   WBC 14.17* 12.26   HGB 13.9* 13.2*   HCT 43.7 40.5    174     Lactic Acid:   Recent Labs   Lab 02/26/25  1133 02/27/25  0822   LACTATE 3.1* 3.3*     X-Ray Chest AP Portable   Final Result      1.  Interval development of increased density throughout the mid lower right lung most consistent with early pneumonia.      2.  Stable findings as noted above.         Electronically signed by: Diogo Valle MD   Date:    02/26/2025   Time:    11:39         Significant Imaging: I have reviewed all pertinent imaging results/findings within the past 24 hours.

## 2025-02-27 NOTE — PROGRESS NOTES
[facility] ([facility] Medicine  Progress Note    Patient Name: Stone Reveles  MRN: [id number]  Patient Class: IP- Inpatient   Admission Date: 2/26/2025  Length of Stay: 1 days  Attending Physician: Sarah Akers MD  Primary Care Provider: Nessa Thorpe MD        Subjective     Principal Problem:Pneumonia        HPI:  84 y.o. male patient with comorbid conditions of interstitial lung disease, DM, COPD, HTN, and chronic respiratory failure with hypoxia. To ED with c/o of worsening dyspnea over the past few weeks. Pt initially was seen by pulmonary on the 18th for the same complaint where he was prescribed prednisone, with no improvement. Associated sxs include chills and productive cough (blood tinged sputum). Patient denies any fever. Prior Tx includes 125mg Solumedrol by EMS PTA.  No further complaints or concerns at this time.     ED course: CBC WBC 14.17, hgb- 13.9. BMP unremarkable, . Negative troponin. Elevated lactic acid- 3.1. ABG with PaO2- 51. CXR- Interval development of increased density throughout the mid lower right lung most consistent with early pneumonia. Echo pending. Admitted to Hospital Medicine for pneumonia, hemoptysis, and worsening SOB.    Overview/Hospital Course:  85 y/o male with comorbid conditions of interstitial lung disease, DM, COPD, HTN, and chronic respiratory failure with hypoxia. To ED with c/o of worsening dyspnea over the past few weeks. No improvement after steroid treatment for a week. Productive cough. Blood clots coughed X4 episodes. CXR indicative of right mid/lower lobe PNA. Elevated lactic acid.    Echo: left ventricle is normal in size, wall thickness, systolic function, and wall motion. Septal motion is consistent with bundle branch block. Ejection fraction is approximately 60%. There is normal diastolic function. The right ventricle is normal in size, wall thickness, and systolic function. There is aortic valve sclerosis with  moderate stenosis.     02/27/2025- Appearance and SOB much improved. Repeat lactic acid increased to 3.3. Will trend.     Interval History: States he feels better today. Denies SOB. Noted rales in right upper/lower lung fields. No acute events overnight.    Review of Systems   Constitutional:  Negative for chills and fever.   HENT:  Negative for congestion and rhinorrhea.    Respiratory:  Negative for shortness of breath.    Cardiovascular:  Negative for chest pain, palpitations and leg swelling.   Gastrointestinal:  Negative for constipation, diarrhea, nausea and vomiting.   Genitourinary:  Negative for difficulty urinating and dysuria.   Musculoskeletal:  Negative for arthralgias, back pain and myalgias.   Neurological:  Negative for dizziness and light-headedness.   Psychiatric/Behavioral:  Negative for sleep disturbance.      Objective:     Vital Signs (Most Recent):  Temp: 98.1 °F (36.7 °C) (02/27/25 1237)  Pulse: 82 (02/27/25 1326)  Resp: 18 (02/27/25 1326)  BP: (!) 117/56 (02/27/25 1237)  SpO2: 97 % (02/27/25 1326) Vital Signs (24h Range):  Temp:  [96 °F (35.6 °C)-98.1 °F (36.7 °C)] 98.1 °F (36.7 °C)  Pulse:  [70-93] 82  Resp:  [17-20] 18  SpO2:  [91 %-97 %] 97 %  BP: (117-139)/(55-74) 117/56     Weight: 96.6 kg (213 lb)  Body mass index is 29.71 kg/m².    Intake/Output Summary (Last 24 hours) at 2/27/2025 1444  Last data filed at 2/26/2025 1855  Gross per 24 hour   Intake 240 ml   Output --   Net 240 ml         Physical Exam  Vitals and nursing note reviewed.   Constitutional:       General: He is not in acute distress.     Appearance: He is not ill-appearing, toxic-appearing or diaphoretic.   HENT:      Head: Normocephalic and atraumatic.   Eyes:      General: No scleral icterus.        Right eye: No discharge.         Left eye: No discharge.   Cardiovascular:      Rate and Rhythm: Normal rate and regular rhythm.      Heart sounds: Normal heart sounds.   Pulmonary:      Effort: Pulmonary effort is normal.  No respiratory distress.      Breath sounds: Rales (right upper and lower) present.   Abdominal:      General: Bowel sounds are normal.      Tenderness: There is no abdominal tenderness.   Musculoskeletal:      Cervical back: No rigidity.      Right lower leg: No edema.      Left lower leg: No edema.   Skin:     General: Skin is warm and dry.      Coloration: Skin is not jaundiced.   Neurological:      Mental Status: He is alert and oriented to person, place, and time. Mental status is at baseline.   Psychiatric:         Mood and Affect: Mood normal.         Behavior: Behavior normal.             Significant Labs: All pertinent labs within the past 24 hours have been reviewed.  BMP:   Recent Labs   Lab 02/27/25  0458   *      K 4.4      CO2 27   BUN 20   CREATININE 1.0   CALCIUM 9.0   MG 1.7     CBC:   Recent Labs   Lab 02/26/25  1133 02/27/25  0458   WBC 14.17* 12.26   HGB 13.9* 13.2*   HCT 43.7 40.5    174     Lactic Acid:   Recent Labs   Lab 02/26/25  1133 02/27/25  0822   LACTATE 3.1* 3.3*     X-Ray Chest AP Portable   Final Result      1.  Interval development of increased density throughout the mid lower right lung most consistent with early pneumonia.      2.  Stable findings as noted above.         Electronically signed by: Diogo Valle MD   Date:    02/26/2025   Time:    11:39         Significant Imaging: I have reviewed all pertinent imaging results/findings within the past 24 hours.    Assessment and Plan     * Pneumonia  Patient has a diagnosis of pneumonia. The cause of the pneumonia is unknown at this time. The pneumonia is stable. The patient has the following signs/symptoms of pneumonia: cough and shortness of breath. The patient does have a current oxygen requirement and the patient does have a home oxygen requirement. I have reviewed the pertinent imaging. The following cultures have been collected: Blood cultures and Sputum culture The culture results are listed below.      Current antimicrobial regimen consists of  Rocephin, doxycyline . Will monitor patient closely and continue current treatment plan unchanged.    Antibiotics (From admission, onward)      Start     Stop Route Frequency Ordered    02/27/25 1300  cefTRIAXone injection 2 g         -- IV Daily 02/26/25 1505    02/26/25 1200  doxycycline 100 mg in D5W 100 mL IVPB (MB+)         -- IV Every 12 hours (non-standard times) 02/26/25 1159            Microbiology Results (last 7 days)       Procedure Component Value Units Date/Time    Culture, Respiratory with Gram Stain [[id number]]     Order Status: Canceled Specimen: Sputum, Expectorated     Blood Culture #2 **CANNOT BE ORDERED STAT** [[id number]] Collected: 02/26/25 1134    Order Status: Completed Specimen: Blood from Peripheral, Antecubital, Right Updated: 02/27/25 0545     Blood Culture, Routine No Growth to date    Blood Culture #1 **CANNOT BE ORDERED STAT** [[id number]] Collected: 02/26/25 1133    Order Status: Completed Specimen: Blood from Peripheral, Antecubital, Right Updated: 02/27/25 0545     Blood Culture, Routine No Growth to date    Culture, Respiratory with Gram Stain [[id number]] Collected: 02/26/25 1137    Order Status: Completed Specimen: Respiratory from Sputum Updated: 02/27/25 0520     Gram Stain (Respiratory) <10 epithelial cells per low power field.     Gram Stain (Respiratory) Few WBC's     Gram Stain (Respiratory) Few Gram positive cocci     Gram Stain (Respiratory) Rare Gram negative rods    Culture, Respiratory with Gram Stain [[id number]]     Order Status: No result Specimen: Sputum, Expectorated     Influenza A & B by Molecular [[id number]] Collected: 02/26/25 1127    Order Status: Completed Specimen: Nasal Swab Updated: 02/26/25 1159     Influenza A, Molecular Negative     Influenza B, Molecular Negative     Flu A & B Source Nasal swab            Type 2 diabetes mellitus without complication, without long-term current use of insulin  Patient's  FSGs are controlled on current medication regimen.  Last A1c reviewed-   Lab Results   Component Value Date    HGBA1C 6.3 (H) 02/26/2025     Most recent fingerstick glucose reviewed-   Recent Labs   Lab 02/26/25  2148 02/27/25  1158   POCTGLUCOSE 355* 292*     Current correctional scale  Medium  Maintain anti-hyperglycemic dose as follows-   Antihyperglycemics (From admission, onward)      None          Hold Oral hypoglycemics while patient is in the hospital.       COPD, moderate  Patient's COPD is controlled currently.  Patient is currently off COPD Pathway. Continue scheduled inhalers Steroids, Antibiotics, and Supplemental oxygen and monitor respiratory status closely.     Gastroesophageal reflux disease  PPI        VTE Risk Mitigation (From admission, onward)           Ordered     IP VTE HIGH RISK PATIENT  Once         02/27/25 0750     Reason for No Pharmacological VTE Prophylaxis  Once        Question:  Reasons:  Answer:  Already adequately anticoagulated on oral Anticoagulants    02/27/25 0750     enoxaparin injection 40 mg  Daily         02/26/25 1930     Place sequential compression device  Until discontinued         02/26/25 1930                    Discharge Planning   TYRA:      Code Status: Full Code   Medical Readiness for Discharge Date:   Discharge Plan A: Home with family                        ANTONIO Cota-C  Department of Hospital Medicine   [facility] - Telemetry ([facility])

## 2025-02-27 NOTE — CONSULTS
Patient name & MRN  Stone Reveles [id number]    Labs:    Lab Results   Component Value Date    HGBA1C 6.3 (H) 02/26/2025       Recent Labs     02/26/25  2148 02/27/25  1158   POCTGLUCOSE 355* 292*       eGFR   Date Value Ref Range Status   02/27/2025 >60 >60 mL/min/1.73 m^2 Final     Pt on hyperglycemic report for BG in the 200-300's. Pt currently being treated for pneumonia. Pt currently receiving Solumedrol 60 mg every 8 hours.      Insulin regimen:     Home Insulin Doses: None    Hospital Insulin Doses: Moderate correctional scale per MAR this am.    Physician Notification:    Physician Notified? : Yes    Suggested Recommendations Based on [facility] Standard of Practice Insulin Dosing Guidelines:     To treat steroid induced hyperglycemia for a insulin naive pt, pt could benefit from the following: adding prandial insulin-9U with meals, 11U of Lantus daily, and moderate dose SSI. All of which will need to be re-evualated every 24 hours or as steroid dose is tapered.

## 2025-02-27 NOTE — PLAN OF CARE
A228/A228 WILMER Reveles is a 84 y.o.male admitted on 2/26/2025 for Pneumonia   Code Status: Full Code MRN: [id number]   Review of patient's allergies indicates:   Allergen Reactions    Amoxicillin-pot clavulanate Hives    Baclofen Other (See Comments)     Hallucinations      Past Medical History:   Diagnosis Date    Chronic hypertension     Chronic respiratory failure with hypoxia, on home O2 therapy     COPD, very severe     Diabetes mellitus     ILD (interstitial lung disease)       PRN meds    acetaminophen, 650 mg, Q8H PRN  acetaminophen, 650 mg, Q4H PRN  dextrose 50%, 12.5 g, PRN  dextrose 50%, 25 g, PRN  glucagon (human recombinant), 1 mg, PRN  glucose, 16 g, PRN  glucose, 24 g, PRN  insulin aspart U-100, 0-10 Units, QID (AC + HS) PRN  melatonin, 6 mg, Nightly PRN  naloxone, 0.02 mg, PRN  ondansetron, 4 mg, Q8H PRN  simethicone, 1 tablet, QID PRN  sodium chloride 0.9%, 3 mL, Q12H PRN      Chart check completed. Will continue plan of care.         [place] Coma Scale Score: 15     Lead Monitored: Lead II Rhythm: normal sinus rhythm         Last Bowel Movement: 02/25/25  Diet diabetic 2000 Calories (up to 75 gm per meal); Fluid - 1500mL     Redd Score: 22  Fall Risk Score: 14  Accucheck []   Freq?      Lines/Drains/Airways       Peripheral Intravenous Line  Duration                  Peripheral IV - Single Lumen 02/26/25 1130 20 G Right Antecubital <1 day         Peripheral IV - Single Lumen 02/26/25 1200 18 G Left Antecubital <1 day

## 2025-02-27 NOTE — H&P
[facility] Medicine  History & Physical    Patient Name: Stone Reveles  MRN: [id number]  Patient Class: IP- Inpatient  Admission Date: 2/26/2025  Attending Physician: Sarah Akers MD   Primary Care Provider: Nessa Thorpe MD         Patient information was obtained from patient, past medical records, and ER records.     Subjective:     Principal Problem:<principal problem not specified>    Chief Complaint:   Chief Complaint   Patient presents with    Shortness of Breath     [facility] reports SOB x 2 weeks that has progressively gotten worse. RA sats 80's on EMS arrival.         HPI: 84 y.o. male patient with comorbid conditions of interstitial lung disease, DM, COPD, HTN, and chronic respiratory failure with hypoxia. To ED with c/o of worsening dyspnea over the past few weeks. Pt initially was seen by pulmonary on the 18th for the same complaint where he was prescribed prednisone, with no improvement. Associated sxs include chills and productive cough (blood tinged sputum). Patient denies any fever. Prior Tx includes 125mg Solumedrol by EMS PTA.  No further complaints or concerns at this time.     ED course: CBC WBC 14.17, hgb- 13.9. BMP unremarkable, . Negative troponin. Elevated lactic acid- 3.1. ABG with PaO2- 51. CXR- Interval development of increased density throughout the mid lower right lung most consistent with early pneumonia. Echo pending. Admitted to Hospital Medicine for pneumonia, hemoptysis, and worsening SOB.    Past Medical History:   Diagnosis Date    Chronic hypertension     Chronic respiratory failure with hypoxia, on home O2 therapy     COPD, very severe     Diabetes mellitus     ILD (interstitial lung disease)        Past Surgical History:   Procedure Laterality Date    INSERTION OF PACEMAKER         Review of patient's allergies indicates:   Allergen Reactions    Amoxicillin-pot clavulanate Hives    Baclofen Other (See Comments)      Hallucinations        No current facility-administered medications on file prior to encounter.     Current Outpatient Medications on File Prior to Encounter   Medication Sig    albuterol (PROVENTIL) 2.5 mg /3 mL (0.083 %) nebulizer solution Take 3 mLs (2.5 mg total) by nebulization every 4 (four) hours as needed for Wheezing or Shortness of Breath.    albuterol (VENTOLIN HFA) 90 mcg/actuation inhaler Inhale 2 puffs into the lungs every 4 (four) hours as needed for Wheezing or Shortness of Breath (cough).    aspirin 81 MG Chew Take 81 mg by mouth once daily.    atorvastatin (LIPITOR) 40 MG tablet Take 40 mg by mouth once daily.    clopidogreL (PLAVIX) 75 mg tablet Take 1 tablet by mouth every morning.    EScitalopram oxalate (LEXAPRO) 20 MG tablet Take 1 tablet by mouth every morning.    fluticasone (FLONASE) 50 mcg/actuation nasal spray 1 spray by Each Nare route once daily.    ipratropium (ATROVENT) 42 mcg (0.06 %) nasal spray 2 sprays by Each Nostril route 2 (two) times daily.    levocetirizine (XYZAL) 5 MG tablet Take 1 tablet (5 mg total) by mouth every evening.    losartan (COZAAR) 50 MG tablet Take 1 tablet by mouth once daily.    metformin (GLUCOPHAGE) 500 MG tablet Take 500 mg by mouth every evening.    metoprolol succinate (TOPROL-XL) 25 MG 24 hr tablet Take 25 mg by mouth 2 (two) times daily.    pantoprazole (PROTONIX) 40 MG tablet Take 1 tablet by mouth once daily.    predniSONE (DELTASONE) 20 MG tablet Take 0.5 tablets (10 mg total) by mouth 2 (two) times daily. (Patient taking differently: Take 10 mg by mouth once daily.)    tamsulosin (FLOMAX) 0.4 mg Cap Take 1 capsule by mouth every evening.    traZODone (DESYREL) 50 MG tablet Take 100 mg by mouth every evening.    TRELEGY ELLIPTA 200-62.5-25 mcg inhaler INHALE 1 PUFF BY MOUTH INTO THE LUNGS ONCE DAILY.    [DISCONTINUED] azithromycin (Z-CHARLENE) 250 MG tablet Take 2 tablets by mouth on day 1; Take 1 tablet by mouth on days 2-5    [DISCONTINUED]  brimonidine 0.2% (ALPHAGAN) 0.2 % Drop Place into both eyes.    [DISCONTINUED] chlorpheniramine (CHLOR-TRIMETON) 4 mg tablet Take 4 mg by mouth every 4 (four) hours as needed.    [DISCONTINUED] cycloSPORINE (RESTASIS) 0.05 % ophthalmic emulsion Place 1 drop into both eyes 2 (two) times daily.    [DISCONTINUED] dorzolamide (TRUSOPT) 2 % ophthalmic solution Place into both eyes.    [DISCONTINUED] empagliflozin (JARDIANCE) 10 mg tablet Take 10 mg by mouth once daily.    [DISCONTINUED] escitalopram oxalate (LEXAPRO) 20 MG tablet Take 20 mg by mouth once daily.    [DISCONTINUED] HYDROcodone-acetaminophen (NORCO) 5-325 mg per tablet     [DISCONTINUED] losartan (COZAAR) 50 MG tablet     [DISCONTINUED] meloxicam (MOBIC) 15 MG tablet     [DISCONTINUED] metoprolol tartrate (LOPRESSOR) 25 MG tablet     [DISCONTINUED] metoprolol tartrate (LOPRESSOR) 25 MG tablet Take 1 tablet by mouth 2 (two) times daily.    [DISCONTINUED] metoprolol tartrate (LOPRESSOR) 50 MG tablet Take 50 mg by mouth 2 (two) times daily.    [DISCONTINUED] mupirocin (BACTROBAN) 2 % ointment Apply topically 3 (three) times daily.    [DISCONTINUED] pantoprazole (PROTONIX) 40 MG tablet Take 40 mg by mouth once daily.    [DISCONTINUED] prednisoLONE acetate (PRED FORTE) 1 % DrpS Place into both eyes.    [DISCONTINUED] sildenafiL (VIAGRA) 100 MG tablet Take 100 mg by mouth as needed.    [DISCONTINUED] simvastatin (ZOCOR) 10 MG tablet Take 10 mg by mouth once daily.    [DISCONTINUED] tamsulosin (FLOMAX) 0.4 mg Cp24 Take 0.4 mg by mouth once daily. evening    [DISCONTINUED] ticagrelor (BRILINTA) 90 mg tablet Take 90 mg by mouth.    [DISCONTINUED] tiZANidine (ZANAFLEX) 2 MG tablet     [DISCONTINUED] tiZANidine (ZANAFLEX) 2 MG tablet Take 1 tablet by mouth 2 (two) times daily as needed.    [DISCONTINUED] traZODone (DESYREL) 50 MG tablet      Family History       Problem Relation (Age of Onset)    Alcohol abuse Brother, Brother    Asbestos Brother    Diabetes Mother     Heart disease Mother    Pneumonia Brother    Stroke Father    blood disease Brother          Tobacco Use    Smoking status: Former     Current packs/day: 0.00     Average packs/day: 1 pack/day for 56.0 years (56.0 ttl pk-yrs)     Types: Cigarettes     Start date: 1954     Quit date: 2010     Years since quitting: 15.1    Smokeless tobacco: Current     Types: Snuff   Substance and Sexual Activity    Alcohol use: Never    Drug use: No    Sexual activity: Yes     Review of Systems  Objective:     Vital Signs (Most Recent):  Temp: 96 °F (35.6 °C) (02/26/25 1726)  Pulse: 89 (02/26/25 1726)  Resp: 18 (02/26/25 1726)  BP: (!) 118/57 (02/26/25 1726)  SpO2: (!) 91 % (02/26/25 1726) Vital Signs (24h Range):  Temp:  [96 °F (35.6 °C)-98 °F (36.7 °C)] 96 °F (35.6 °C)  Pulse:  [84-98] 89  Resp:  [18-28] 18  SpO2:  [85 %-96 %] 91 %  BP: (112-161)/(55-90) 118/57     Weight: 96.6 kg (213 lb)  Body mass index is 29.71 kg/m².     Physical Exam           Significant Labs: All pertinent labs within the past 24 hours have been reviewed.  CBC:   Recent Labs   Lab 02/26/25  1133   WBC 14.17*   HGB 13.9*   HCT 43.7        CMP:   Recent Labs   Lab 02/26/25  1133      K 3.7      CO2 26   *   BUN 15   CREATININE 0.9   CALCIUM 8.7   PROT 6.3   ALBUMIN 3.6   BILITOT 1.0   ALKPHOS 107   AST 13   ALT 15   ANIONGAP 12     Coagulation:   Recent Labs   Lab 02/26/25  1133   INR 1.0   APTT 21.7     Lactic Acid:   Recent Labs   Lab 02/26/25  1133   LACTATE 3.1*     X-Ray Chest AP Portable   Final Result      1.  Interval development of increased density throughout the mid lower right lung most consistent with early pneumonia.      2.  Stable findings as noted above.         Electronically signed by: Diogo Valle MD   Date:    02/26/2025   Time:    11:39           Significant Imaging: I have reviewed all pertinent imaging results/findings within the past 24 hours.  Assessment/Plan:     No notes have been filed under this  hospital service.  Service: Hospital Medicine    VTE Risk Mitigation (From admission, onward)           Ordered     enoxaparin injection 40 mg  Daily         02/26/25 1930     IP VTE HIGH RISK PATIENT  Once         02/26/25 1930     Place sequential compression device  Until discontinued         02/26/25 1930                                    Justin Colbert, REXP-C  Department of Hospital Medicine  [facility] - Telemetry ([facility])

## 2025-02-27 NOTE — SUBJECTIVE & OBJECTIVE
Past Medical History:   Diagnosis Date    Chronic hypertension     Chronic respiratory failure with hypoxia, on home O2 therapy     COPD, very severe     Diabetes mellitus     ILD (interstitial lung disease)        Past Surgical History:   Procedure Laterality Date    INSERTION OF PACEMAKER         Review of patient's allergies indicates:   Allergen Reactions    Amoxicillin-pot clavulanate Hives    Baclofen Other (See Comments)     Hallucinations        No current facility-administered medications on file prior to encounter.     Current Outpatient Medications on File Prior to Encounter   Medication Sig    albuterol (PROVENTIL) 2.5 mg /3 mL (0.083 %) nebulizer solution Take 3 mLs (2.5 mg total) by nebulization every 4 (four) hours as needed for Wheezing or Shortness of Breath.    albuterol (VENTOLIN HFA) 90 mcg/actuation inhaler Inhale 2 puffs into the lungs every 4 (four) hours as needed for Wheezing or Shortness of Breath (cough).    aspirin 81 MG Chew Take 81 mg by mouth once daily.    atorvastatin (LIPITOR) 40 MG tablet Take 40 mg by mouth once daily.    clopidogreL (PLAVIX) 75 mg tablet Take 1 tablet by mouth every morning.    EScitalopram oxalate (LEXAPRO) 20 MG tablet Take 1 tablet by mouth every morning.    fluticasone (FLONASE) 50 mcg/actuation nasal spray 1 spray by Each Nare route once daily.    ipratropium (ATROVENT) 42 mcg (0.06 %) nasal spray 2 sprays by Each Nostril route 2 (two) times daily.    levocetirizine (XYZAL) 5 MG tablet Take 1 tablet (5 mg total) by mouth every evening.    losartan (COZAAR) 50 MG tablet Take 1 tablet by mouth once daily.    metformin (GLUCOPHAGE) 500 MG tablet Take 500 mg by mouth every evening.    metoprolol succinate (TOPROL-XL) 25 MG 24 hr tablet Take 25 mg by mouth 2 (two) times daily.    pantoprazole (PROTONIX) 40 MG tablet Take 1 tablet by mouth once daily.    predniSONE (DELTASONE) 20 MG tablet Take 0.5 tablets (10 mg total) by mouth 2 (two) times daily. (Patient  taking differently: Take 10 mg by mouth once daily.)    tamsulosin (FLOMAX) 0.4 mg Cap Take 1 capsule by mouth every evening.    traZODone (DESYREL) 50 MG tablet Take 100 mg by mouth every evening.    TRELEGY ELLIPTA 200-62.5-25 mcg inhaler INHALE 1 PUFF BY MOUTH INTO THE LUNGS ONCE DAILY.    [DISCONTINUED] azithromycin (Z-CHARLENE) 250 MG tablet Take 2 tablets by mouth on day 1; Take 1 tablet by mouth on days 2-5    [DISCONTINUED] brimonidine 0.2% (ALPHAGAN) 0.2 % Drop Place into both eyes.    [DISCONTINUED] chlorpheniramine (CHLOR-TRIMETON) 4 mg tablet Take 4 mg by mouth every 4 (four) hours as needed.    [DISCONTINUED] cycloSPORINE (RESTASIS) 0.05 % ophthalmic emulsion Place 1 drop into both eyes 2 (two) times daily.    [DISCONTINUED] dorzolamide (TRUSOPT) 2 % ophthalmic solution Place into both eyes.    [DISCONTINUED] empagliflozin (JARDIANCE) 10 mg tablet Take 10 mg by mouth once daily.    [DISCONTINUED] escitalopram oxalate (LEXAPRO) 20 MG tablet Take 20 mg by mouth once daily.    [DISCONTINUED] HYDROcodone-acetaminophen (NORCO) 5-325 mg per tablet     [DISCONTINUED] losartan (COZAAR) 50 MG tablet     [DISCONTINUED] meloxicam (MOBIC) 15 MG tablet     [DISCONTINUED] metoprolol tartrate (LOPRESSOR) 25 MG tablet     [DISCONTINUED] metoprolol tartrate (LOPRESSOR) 25 MG tablet Take 1 tablet by mouth 2 (two) times daily.    [DISCONTINUED] metoprolol tartrate (LOPRESSOR) 50 MG tablet Take 50 mg by mouth 2 (two) times daily.    [DISCONTINUED] mupirocin (BACTROBAN) 2 % ointment Apply topically 3 (three) times daily.    [DISCONTINUED] pantoprazole (PROTONIX) 40 MG tablet Take 40 mg by mouth once daily.    [DISCONTINUED] prednisoLONE acetate (PRED FORTE) 1 % DrpS Place into both eyes.    [DISCONTINUED] sildenafiL (VIAGRA) 100 MG tablet Take 100 mg by mouth as needed.    [DISCONTINUED] simvastatin (ZOCOR) 10 MG tablet Take 10 mg by mouth once daily.    [DISCONTINUED] tamsulosin (FLOMAX) 0.4 mg Cp24 Take 0.4 mg by mouth once  daily. evening    [DISCONTINUED] ticagrelor (BRILINTA) 90 mg tablet Take 90 mg by mouth.    [DISCONTINUED] tiZANidine (ZANAFLEX) 2 MG tablet     [DISCONTINUED] tiZANidine (ZANAFLEX) 2 MG tablet Take 1 tablet by mouth 2 (two) times daily as needed.    [DISCONTINUED] traZODone (DESYREL) 50 MG tablet      Family History       Problem Relation (Age of Onset)    Alcohol abuse Brother, Brother    Asbestos Brother    Diabetes Mother    Heart disease Mother    Pneumonia Brother    Stroke Father    blood disease Brother          Tobacco Use    Smoking status: Former     Current packs/day: 0.00     Average packs/day: 1 pack/day for 56.0 years (56.0 ttl pk-yrs)     Types: Cigarettes     Start date: 1954     Quit date: 2010     Years since quitting: 15.1    Smokeless tobacco: Current     Types: Snuff   Substance and Sexual Activity    Alcohol use: Never    Drug use: No    Sexual activity: Yes     Review of Systems   Constitutional:  Negative for chills and fever.   HENT:  Negative for congestion and rhinorrhea.    Respiratory:  Positive for cough and shortness of breath.    Cardiovascular:  Negative for chest pain, palpitations and leg swelling.   Gastrointestinal:  Negative for constipation, diarrhea, nausea and vomiting.   Genitourinary:  Negative for difficulty urinating and dysuria.   Musculoskeletal:  Negative for arthralgias, back pain and myalgias.   Neurological:  Negative for dizziness and light-headedness.   Psychiatric/Behavioral:  Negative for sleep disturbance.      Objective:     Vital Signs (Most Recent):  Temp: 96 °F (35.6 °C) (02/26/25 1726)  Pulse: 89 (02/26/25 1726)  Resp: 18 (02/26/25 1726)  BP: (!) 118/57 (02/26/25 1726)  SpO2: (!) 91 % (02/26/25 1726) Vital Signs (24h Range):  Temp:  [96 °F (35.6 °C)-98 °F (36.7 °C)] 96 °F (35.6 °C)  Pulse:  [84-98] 89  Resp:  [18-28] 18  SpO2:  [85 %-96 %] 91 %  BP: (112-161)/(55-90) 118/57     Weight: 96.6 kg (213 lb)  Body mass index is 29.71 kg/m².     Physical  Exam  Vitals and nursing note reviewed.   Constitutional:       General: He is not in acute distress.     Appearance: He is not ill-appearing, toxic-appearing or diaphoretic.   HENT:      Head: Normocephalic and atraumatic.   Eyes:      General: No scleral icterus.        Right eye: No discharge.         Left eye: No discharge.   Cardiovascular:      Rate and Rhythm: Normal rate and regular rhythm.      Heart sounds: Normal heart sounds.   Pulmonary:      Effort: Pulmonary effort is normal. No respiratory distress.      Breath sounds: Rales present.   Abdominal:      General: Bowel sounds are normal.      Tenderness: There is no abdominal tenderness.   Musculoskeletal:      Cervical back: No rigidity.      Right lower leg: No edema.      Left lower leg: No edema.   Skin:     General: Skin is warm and dry.      Coloration: Skin is not jaundiced.   Neurological:      Mental Status: He is alert and oriented to person, place, and time. Mental status is at baseline.   Psychiatric:         Mood and Affect: Mood normal.         Behavior: Behavior normal.                Significant Labs: All pertinent labs within the past 24 hours have been reviewed.  CBC:   Recent Labs   Lab 02/26/25  1133   WBC 14.17*   HGB 13.9*   HCT 43.7        CMP:   Recent Labs   Lab 02/26/25  1133      K 3.7      CO2 26   *   BUN 15   CREATININE 0.9   CALCIUM 8.7   PROT 6.3   ALBUMIN 3.6   BILITOT 1.0   ALKPHOS 107   AST 13   ALT 15   ANIONGAP 12     Coagulation:   Recent Labs   Lab 02/26/25  1133   INR 1.0   APTT 21.7     Lactic Acid:   Recent Labs   Lab 02/26/25  1133   LACTATE 3.1*       Significant Imaging: I have reviewed all pertinent imaging results/findings within the past 24 hours.

## 2025-02-28 VITALS
HEART RATE: 73 BPM | DIASTOLIC BLOOD PRESSURE: 59 MMHG | WEIGHT: 211.19 LBS | HEIGHT: 71 IN | BODY MASS INDEX: 29.56 KG/M2 | RESPIRATION RATE: 20 BRPM | TEMPERATURE: 41 F | SYSTOLIC BLOOD PRESSURE: 130 MMHG | OXYGEN SATURATION: 97 %

## 2025-02-28 LAB
ALBUMIN SERPL BCP-MCNC: 3.1 G/DL (ref 3.5–5.2)
ALP SERPL-CCNC: 87 U/L (ref 40–150)
ALT SERPL W/O P-5'-P-CCNC: 12 U/L (ref 10–44)
ANION GAP SERPL CALC-SCNC: 10 MMOL/L (ref 8–16)
AST SERPL-CCNC: 15 U/L (ref 10–40)
BASOPHILS # BLD AUTO: 0.01 K/UL (ref 0–0.2)
BASOPHILS NFR BLD: 0.1 % (ref 0–1.9)
BILIRUB SERPL-MCNC: 0.7 MG/DL (ref 0.1–1)
BUN SERPL-MCNC: 24 MG/DL (ref 8–23)
CALCIUM SERPL-MCNC: 8.9 MG/DL (ref 8.7–10.5)
CHLORIDE SERPL-SCNC: 102 MMOL/L (ref 95–110)
CO2 SERPL-SCNC: 26 MMOL/L (ref 23–29)
CREAT SERPL-MCNC: 1.2 MG/DL (ref 0.5–1.4)
DIFFERENTIAL METHOD BLD: ABNORMAL
EOSINOPHIL # BLD AUTO: 0 K/UL (ref 0–0.5)
EOSINOPHIL NFR BLD: 0 % (ref 0–8)
ERYTHROCYTE [DISTWIDTH] IN BLOOD BY AUTOMATED COUNT: 14.6 % (ref 11.5–14.5)
EST. GFR  (NO RACE VARIABLE): 60 ML/MIN/1.73 M^2
GLUCOSE SERPL-MCNC: 251 MG/DL (ref 70–110)
HCT VFR BLD AUTO: 40.7 % (ref 40–54)
HGB BLD-MCNC: 13 G/DL (ref 14–18)
IMM GRANULOCYTES # BLD AUTO: 0.11 K/UL (ref 0–0.04)
IMM GRANULOCYTES NFR BLD AUTO: 0.7 % (ref 0–0.5)
LACTATE SERPL-SCNC: 3.1 MMOL/L (ref 0.5–2.2)
LYMPHOCYTES # BLD AUTO: 0.7 K/UL (ref 1–4.8)
LYMPHOCYTES NFR BLD: 4.5 % (ref 18–48)
MAGNESIUM SERPL-MCNC: 2.3 MG/DL (ref 1.6–2.6)
MCH RBC QN AUTO: 29.2 PG (ref 27–31)
MCHC RBC AUTO-ENTMCNC: 31.9 G/DL (ref 32–36)
MCV RBC AUTO: 92 FL (ref 82–98)
MONOCYTES # BLD AUTO: 0.4 K/UL (ref 0.3–1)
MONOCYTES NFR BLD: 2.5 % (ref 4–15)
NEUTROPHILS # BLD AUTO: 15.3 K/UL (ref 1.8–7.7)
NEUTROPHILS NFR BLD: 92.2 % (ref 38–73)
NRBC BLD-RTO: 0 /100 WBC
PHOSPHATE SERPL-MCNC: 2.9 MG/DL (ref 2.7–4.5)
PLATELET # BLD AUTO: 212 K/UL (ref 150–450)
PMV BLD AUTO: 10.3 FL (ref 9.2–12.9)
POCT GLUCOSE: 206 MG/DL (ref 70–110)
POTASSIUM SERPL-SCNC: 4.9 MMOL/L (ref 3.5–5.1)
PROT SERPL-MCNC: 6.1 G/DL (ref 6–8.4)
RBC # BLD AUTO: 4.45 M/UL (ref 4.6–6.2)
SODIUM SERPL-SCNC: 138 MMOL/L (ref 136–145)
WBC # BLD AUTO: 16.56 K/UL (ref 3.9–12.7)

## 2025-02-28 PROCEDURE — 84100 ASSAY OF PHOSPHORUS: CPT

## 2025-02-28 PROCEDURE — 25000003 PHARM REV CODE 250

## 2025-02-28 PROCEDURE — 83735 ASSAY OF MAGNESIUM: CPT

## 2025-02-28 PROCEDURE — 25000003 PHARM REV CODE 250: Performed by: INTERNAL MEDICINE

## 2025-02-28 PROCEDURE — 99900035 HC TECH TIME PER 15 MIN (STAT)

## 2025-02-28 PROCEDURE — 25000242 PHARM REV CODE 250 ALT 637 W/ HCPCS

## 2025-02-28 PROCEDURE — 85025 COMPLETE CBC W/AUTO DIFF WBC: CPT | Performed by: INTERNAL MEDICINE

## 2025-02-28 PROCEDURE — 63600175 PHARM REV CODE 636 W HCPCS: Performed by: INTERNAL MEDICINE

## 2025-02-28 PROCEDURE — 27000221 HC OXYGEN, UP TO 24 HOURS

## 2025-02-28 PROCEDURE — 94799 UNLISTED PULMONARY SVC/PX: CPT

## 2025-02-28 PROCEDURE — 83605 ASSAY OF LACTIC ACID: CPT

## 2025-02-28 PROCEDURE — 80053 COMPREHEN METABOLIC PANEL: CPT

## 2025-02-28 PROCEDURE — 87081 CULTURE SCREEN ONLY: CPT | Performed by: INTERNAL MEDICINE

## 2025-02-28 PROCEDURE — 36415 COLL VENOUS BLD VENIPUNCTURE: CPT

## 2025-02-28 PROCEDURE — 94640 AIRWAY INHALATION TREATMENT: CPT

## 2025-02-28 RX ORDER — INSULIN ASPART 100 [IU]/ML
4 INJECTION, SOLUTION INTRAVENOUS; SUBCUTANEOUS
Status: DISCONTINUED | OUTPATIENT
Start: 2025-02-28 | End: 2025-02-28 | Stop reason: HOSPADM

## 2025-02-28 RX ORDER — INSULIN GLARGINE 100 [IU]/ML
16 INJECTION, SOLUTION SUBCUTANEOUS DAILY
Qty: 4.8 ML | Refills: 11 | Status: SHIPPED | OUTPATIENT
Start: 2025-02-28 | End: 2026-02-28

## 2025-02-28 RX ORDER — IBUPROFEN 200 MG
24 TABLET ORAL
Status: DISCONTINUED | OUTPATIENT
Start: 2025-02-28 | End: 2025-02-28 | Stop reason: HOSPADM

## 2025-02-28 RX ORDER — CEFDINIR 300 MG/1
300 CAPSULE ORAL 2 TIMES DAILY
Qty: 10 CAPSULE | Refills: 0 | Status: SHIPPED | OUTPATIENT
Start: 2025-02-28 | End: 2025-03-05

## 2025-02-28 RX ORDER — GLUCAGON 1 MG
1 KIT INJECTION
Status: DISCONTINUED | OUTPATIENT
Start: 2025-02-28 | End: 2025-02-28 | Stop reason: HOSPADM

## 2025-02-28 RX ORDER — DOXYCYCLINE 100 MG/1
100 CAPSULE ORAL EVERY 12 HOURS
Qty: 10 CAPSULE | Refills: 0 | Status: SHIPPED | OUTPATIENT
Start: 2025-02-28 | End: 2025-03-05

## 2025-02-28 RX ORDER — METHYLPREDNISOLONE 4 MG/1
TABLET ORAL
Qty: 38 TABLET | Refills: 0 | Status: SHIPPED | OUTPATIENT
Start: 2025-02-28 | End: 2025-03-10

## 2025-02-28 RX ORDER — IBUPROFEN 200 MG
16 TABLET ORAL
Status: DISCONTINUED | OUTPATIENT
Start: 2025-02-28 | End: 2025-02-28 | Stop reason: HOSPADM

## 2025-02-28 RX ADMIN — FUROSEMIDE 40 MG: 10 INJECTION, SOLUTION INTRAMUSCULAR; INTRAVENOUS at 08:02

## 2025-02-28 RX ADMIN — FLUTICASONE PROPIONATE 50 MCG: 50 SPRAY, METERED NASAL at 08:02

## 2025-02-28 RX ADMIN — LOSARTAN POTASSIUM 50 MG: 50 TABLET, FILM COATED ORAL at 08:02

## 2025-02-28 RX ADMIN — DOXYCYCLINE 100 MG: 100 INJECTION, POWDER, LYOPHILIZED, FOR SOLUTION INTRAVENOUS at 12:02

## 2025-02-28 RX ADMIN — CLOPIDOGREL BISULFATE 75 MG: 75 TABLET ORAL at 06:02

## 2025-02-28 RX ADMIN — ARFORMOTEROL TARTRATE 15 MCG: 15 SOLUTION RESPIRATORY (INHALATION) at 08:02

## 2025-02-28 RX ADMIN — IPRATROPIUM BROMIDE 2 SPRAY: 42 SPRAY, METERED NASAL at 08:02

## 2025-02-28 RX ADMIN — METOPROLOL SUCCINATE 25 MG: 25 TABLET, EXTENDED RELEASE ORAL at 08:02

## 2025-02-28 RX ADMIN — ATORVASTATIN CALCIUM 40 MG: 40 TABLET, FILM COATED ORAL at 08:02

## 2025-02-28 RX ADMIN — PANTOPRAZOLE SODIUM 40 MG: 40 TABLET, DELAYED RELEASE ORAL at 08:02

## 2025-02-28 RX ADMIN — INSULIN ASPART 4 UNITS: 100 INJECTION, SOLUTION INTRAVENOUS; SUBCUTANEOUS at 06:02

## 2025-02-28 RX ADMIN — POLYETHYLENE GLYCOL 3350 17 G: 17 POWDER, FOR SOLUTION ORAL at 08:02

## 2025-02-28 RX ADMIN — ESCITALOPRAM OXALATE 20 MG: 10 TABLET ORAL at 06:02

## 2025-02-28 RX ADMIN — ASPIRIN 81 MG CHEWABLE TABLET 81 MG: 81 TABLET CHEWABLE at 08:02

## 2025-02-28 RX ADMIN — METHYLPREDNISOLONE SODIUM SUCCINATE 60 MG: 40 INJECTION, POWDER, FOR SOLUTION INTRAMUSCULAR; INTRAVENOUS at 12:02

## 2025-02-28 RX ADMIN — BUDESONIDE INHALATION 0.5 MG: 0.5 SUSPENSION RESPIRATORY (INHALATION) at 08:02

## 2025-02-28 NOTE — PLAN OF CARE
A228/A228 WILMER Reveles is a 84 y.o.male admitted on 2/26/2025 for Pneumonia   Code Status: Full Code MRN: [id number]   Review of patient's allergies indicates:   Allergen Reactions    Amoxicillin-pot clavulanate Hives    Baclofen Other (See Comments)     Hallucinations      Past Medical History:   Diagnosis Date    Chronic hypertension     Chronic respiratory failure with hypoxia, on home O2 therapy     COPD, very severe     Diabetes mellitus     ILD (interstitial lung disease)       PRN meds    acetaminophen, 650 mg, Q4H PRN  acetaminophen, 650 mg, Q8H PRN  albuterol-ipratropium, 3 mL, Q6H PRN  dextrose 50%, 12.5 g, PRN  dextrose 50%, 12.5 g, PRN  dextrose 50%, 25 g, PRN  dextrose 50%, 25 g, PRN  glucagon (human recombinant), 1 mg, PRN  glucagon (human recombinant), 1 mg, PRN  glucose, 16 g, PRN  glucose, 24 g, PRN  glucose, 24 g, PRN  insulin aspart U-100, 0-10 Units, QID (AC + HS) PRN  melatonin, 6 mg, Nightly PRN  naloxone, 0.02 mg, PRN  ondansetron, 4 mg, Q8H PRN  prochlorperazine, 5 mg, Q6H PRN  simethicone, 1 tablet, QID PRN  sodium chloride 0.9%, 10 mL, Q12H PRN  sodium chloride 0.9%, 3 mL, Q12H PRN      Chart check completed. Will continue plan of care.         [place] Coma Scale Score: 15     Lead Monitored: Lead II Rhythm: normal sinus rhythm    Cardiac/Telemetry Box Number: [id number]    Last Bowel Movement: 02/26/25  Diet Cardiac Consistent Carbohydrate; Standard Tray     Redd Score: 18  Fall Risk Score: 14  Accucheck []   Freq?      Lines/Drains/Airways       Peripheral Intravenous Line  Duration                  Peripheral IV - Single Lumen 02/27/25 2000 20 G Distal;Left;Posterior Forearm <1 day                         Problem: Adult Inpatient Plan of Care  Goal: Plan of Care Review  Outcome: Progressing     Problem: Adult Inpatient Plan of Care  Goal: Patient-Specific Goal (Individualized)  Outcome: Progressing

## 2025-03-01 LAB
BACTERIA SPEC AEROBE CULT: NORMAL
GRAM STN SPEC: NORMAL

## 2025-03-02 LAB — MRSA SPEC QL CULT: NORMAL

## 2025-03-03 LAB — BACTERIA BLD CULT: NORMAL

## 2025-03-03 NOTE — DISCHARGE SUMMARY
[facility] - Telemetry ([facility])  [facility] Medicine  Discharge Summary      Patient Name: Stone Reveles  MRN: [id number]  CURRY: [id number]  Patient Class: IP- Inpatient  Admission Date: 2/26/2025  Hospital Length of Stay: 2 days  Discharge Date and Time: 2/28/2025  1:48 PM  Attending Physician: No att. providers found   Discharging Provider: Sarah Lemons MD  Primary Care Provider: Nessa Thorpe MD    Primary Care Team: Networked reference to record PCT     HPI:   84 y.o. male patient with comorbid conditions of interstitial lung disease, DM, COPD, HTN, and chronic respiratory failure with hypoxia. To ED with c/o of worsening dyspnea over the past few weeks. Pt initially was seen by pulmonary on the 18th for the same complaint where he was prescribed prednisone, with no improvement. Associated sxs include chills and productive cough (blood tinged sputum). Patient denies any fever. Prior Tx includes 125mg Solumedrol by EMS PTA.  No further complaints or concerns at this time.     ED course: CBC WBC 14.17, hgb- 13.9. BMP unremarkable, . Negative troponin. Elevated lactic acid- 3.1. ABG with PaO2- 51. CXR- Interval development of increased density throughout the mid lower right lung most consistent with early pneumonia. Echo pending. Admitted to Hospital Medicine for pneumonia, hemoptysis, and worsening SOB.    * No surgery found *      Hospital Course:   83 y/o male with comorbid conditions of interstitial lung disease, DM, COPD, HTN, and chronic respiratory failure with hypoxia. To ED with c/o of worsening dyspnea over the past few weeks. No improvement after steroid treatment for a week. Productive cough. Blood clots coughed X4 episodes. CXR indicative of right mid/lower lobe PNA. Elevated lactic acid.    Echo: left ventricle is normal in size, wall thickness, systolic function, and wall motion. Septal motion is consistent with bundle branch block. Ejection fraction is approximately 60%. There is  normal diastolic function. The right ventricle is normal in size, wall thickness, and systolic function. There is aortic valve sclerosis with moderate stenosis.     02/27/2025- Appearance and SOB much improved. Repeat lactic acid increased to 3.1 with follow up check at 4.0 and down trend to 3.1.    Patient is discharged home with resumption of home medications. Prescriptions for doxycycline 100 mg tablets, take 1 tablet PO every 12 hours for 5 days and cefdinir 300 mg tablets, take 1 tablet PO BID for 5 days, additionally Insulin glargine injector pen, inject 16 units daily. Tapering dose of prednisone 20 mg PO for 5 days and 10 mg PO for 5 days, then stop. Resume your home steroid dose when complete. Patient instructed to check blood sugar daily. Also instructed to wear oxygen continuously. Follow up recommendations to PCP and Pulmonary clinic included in discharge instructions. I have seen and evaluated this patient and he is medically stable for discharge.     Goals of Care Treatment Preferences:  Code Status: Full Code      SDOH Screening:  The patient was screened for utility difficulties, food insecurity, transport difficulties, housing insecurity, and interpersonal safety and there were no concerns identified this admission.     Consults:   Consults (From admission, onward)          Status Ordering Provider     Inpatient consult to Diabetes educator  Once        Provider:  (Not yet assigned)    Completed CHAD PARKS            * Pneumonia  Patient has a diagnosis of pneumonia. The cause of the pneumonia is unknown at this time. The pneumonia is stable. The patient has the following signs/symptoms of pneumonia: cough and shortness of breath. The patient does have a current oxygen requirement and the patient does have a home oxygen requirement. I have reviewed the pertinent imaging. The following cultures have been collected: Blood cultures and Sputum culture The culture results are listed below.      Current antimicrobial regimen consists of  Rocephin, doxycyline . Will monitor patient closely and continue current treatment plan unchanged.    Antibiotics (From admission, onward)      Start     Stop Route Frequency Ordered    02/27/25 1300  cefTRIAXone injection 2 g         -- IV Daily 02/26/25 1505    02/26/25 1200  doxycycline 100 mg in D5W 100 mL IVPB (MB+)         -- IV Every 12 hours (non-standard times) 02/26/25 1159            Microbiology Results (last 7 days)       Procedure Component Value Units Date/Time    Culture, Respiratory with Gram Stain [[id number]]     Order Status: Canceled Specimen: Sputum, Expectorated     Blood Culture #2 **CANNOT BE ORDERED STAT** [[id number]] Collected: 02/26/25 1134    Order Status: Completed Specimen: Blood from Peripheral, Antecubital, Right Updated: 02/27/25 0545     Blood Culture, Routine No Growth to date    Blood Culture #1 **CANNOT BE ORDERED STAT** [[id number]] Collected: 02/26/25 1133    Order Status: Completed Specimen: Blood from Peripheral, Antecubital, Right Updated: 02/27/25 0545     Blood Culture, Routine No Growth to date    Culture, Respiratory with Gram Stain [[id number]] Collected: 02/26/25 1137    Order Status: Completed Specimen: Respiratory from Sputum Updated: 02/27/25 0520     Gram Stain (Respiratory) <10 epithelial cells per low power field.     Gram Stain (Respiratory) Few WBC's     Gram Stain (Respiratory) Few Gram positive cocci     Gram Stain (Respiratory) Rare Gram negative rods    Culture, Respiratory with Gram Stain [[id number]]     Order Status: No result Specimen: Sputum, Expectorated     Influenza A & B by Molecular [[id number]] Collected: 02/26/25 1127    Order Status: Completed Specimen: Nasal Swab Updated: 02/26/25 1159     Influenza A, Molecular Negative     Influenza B, Molecular Negative     Flu A & B Source Nasal swab            Type 2 diabetes mellitus without complication, without long-term current use of insulin  Patient's  FSGs are controlled on current medication regimen.  Last A1c reviewed-   Lab Results   Component Value Date    HGBA1C 6.3 (H) 02/26/2025     Most recent fingerstick glucose reviewed-   Recent Labs   Lab 02/26/25  2148 02/27/25  1158   POCTGLUCOSE 355* 292*     Current correctional scale  Medium  Maintain anti-hyperglycemic dose as follows-   Antihyperglycemics (From admission, onward)      None          Hold Oral hypoglycemics while patient is in the hospital.       COPD, moderate  Patient's COPD is controlled currently.  Patient is currently off COPD Pathway. Continue scheduled inhalers Steroids, Antibiotics, and Supplemental oxygen and monitor respiratory status closely.     Gastroesophageal reflux disease  PPI        Final Active Diagnoses:    Diagnosis Date Noted POA    PRINCIPAL PROBLEM:  Pneumonia [J18.9] 02/26/2025 Yes    Gastroesophageal reflux disease [K21.9] 06/13/2019 Yes    COPD, moderate [J44.9] 06/13/2019 Yes    Type 2 diabetes mellitus without complication, without long-term current use of insulin [E11.9] 05/06/2017 Yes      Problems Resolved During this Admission:       Discharged Condition: fair    Disposition: Home or Self Care    Follow Up:   Follow-up Information       Nessa Thorpe MD. Schedule an appointment as soon as possible for a visit.    Specialty: Pediatrics  Contact information:  [address] [address] [address] [phone]               [facility] PULMONARY MEDICINE. Schedule an appointment as soon as possible for a visit.    Specialty: Pulmonology  Contact information:  [address] [facility] [address] [phone]                         Patient Instructions:      Diet diabetic     Activity as tolerated       Significant Diagnostic Studies: Labs: All labs within the past 24 hours have been reviewed    Pending Diagnostic Studies:       Procedure Component Value Units Date/Time    HIV 1/2 Ag/Ab (4th Gen) [[id number]] Collected: 02/26/25 1133     Order Status: Sent Lab Status: No result     Specimen: Blood     Hepatitis C Antibody [[id number]] Collected: 02/26/25 1133    Order Status: Sent Lab Status: No result     Specimen: Blood            Medications:  Reconciled Home Medications:      Medication List        START taking these medications      cefdinir 300 MG capsule  Commonly known as: OMNICEF  Take 1 capsule (300 mg total) by mouth 2 (two) times daily. for 5 days     doxycycline 100 MG Cap  Commonly known as: VIBRAMYCIN  Take 1 capsule (100 mg total) by mouth every 12 (twelve) hours. for 5 days     LANTUS U-100 INSULIN 100 unit/mL injection  Generic drug: insulin glargine U-100 (Lantus)  Inject 16 Units into the skin once daily.     methylPREDNISolone 4 MG Tab  Commonly known as: MEDROL  Take 5 tablets (20 mg total) by mouth once daily for 5 days, THEN 2.5 tablets (10 mg total) once daily for 5 days.  Start taking on: February 28, 2025            CONTINUE taking these medications      * albuterol 90 mcg/actuation inhaler  Commonly known as: VENTOLIN HFA  Inhale 2 puffs into the lungs every 4 (four) hours as needed for Wheezing or Shortness of Breath (cough).     * albuterol 2.5 mg /3 mL (0.083 %) nebulizer solution  Commonly known as: PROVENTIL  Take 3 mLs (2.5 mg total) by nebulization every 4 (four) hours as needed for Wheezing or Shortness of Breath.     aspirin 81 MG Chew  Take 81 mg by mouth once daily.     atorvastatin 40 MG tablet  Commonly known as: LIPITOR  Take 40 mg by mouth once daily.     clopidogreL 75 mg tablet  Commonly known as: PLAVIX  Take 1 tablet by mouth every morning.     EScitalopram oxalate 20 MG tablet  Commonly known as: LEXAPRO  Take 1 tablet by mouth every morning.     fluticasone propionate 50 mcg/actuation nasal spray  Commonly known as: FLONASE  1 spray by Each Nare route once daily.     ipratropium 42 mcg (0.06 %) nasal spray  Commonly known as: ATROVENT  2 sprays by Each Nostril route 2 (two) times daily.    "  levocetirizine 5 MG tablet  Commonly known as: XYZAL  Take 1 tablet (5 mg total) by mouth every evening.     losartan 50 MG tablet  Commonly known as: COZAAR  Take 1 tablet by mouth once daily.     metFORMIN 500 MG tablet  Commonly known as: GLUCOPHAGE  Take 500 mg by mouth every evening.     metoprolol succinate 25 MG 24 hr tablet  Commonly known as: TOPROL-XL  Take 25 mg by mouth 2 (two) times daily.     pantoprazole 40 MG tablet  Commonly known as: PROTONIX  Take 1 tablet by mouth once daily.     predniSONE 20 MG tablet  Commonly known as: DELTASONE  Take 0.5 tablets (10 mg total) by mouth 2 (two) times daily.     tamsulosin 0.4 mg Cap  Commonly known as: FLOMAX  Take 1 capsule by mouth every evening.     traZODone 50 MG tablet  Commonly known as: DESYREL  Take 100 mg by mouth every evening.     TRELEGY ELLIPTA 200-62.5-25 mcg inhaler  Generic drug: fluticasone-umeclidin-vilanter  INHALE 1 PUFF BY MOUTH INTO THE LUNGS ONCE DAILY.           * This list has 2 medication(s) that are the same as other medications prescribed for you. Read the directions carefully, and ask your doctor or other care provider to review them with you.                ASK your doctor about these medications      BD INSULIN SYRINGE ULTRA-FINE 0.3 mL 31 gauge x 5/16" Syrg  Generic drug: insulin syringe-needle U-100  Use with insulin once dialy              Indwelling Lines/Drains at time of discharge:   Lines/Drains/Airways       None                   Time spent on the discharge of patient: 33 minutes       This dc summary was created by Justin Colbert NP, however he did not generate note so I am cosigning to complete  Sarah Lemons MD  Department of Hospital Medicine  [facility] - Telemetry ([facility])  "

## 2025-03-04 LAB — BACTERIA BLD CULT: NORMAL

## 2025-03-14 ENCOUNTER — OFFICE VISIT (OUTPATIENT)
Dept: PULMONOLOGY | Facility: CLINIC | Age: 85
End: 2025-03-14
Payer: MEDICARE

## 2025-03-14 VITALS
DIASTOLIC BLOOD PRESSURE: 74 MMHG | HEART RATE: 80 BPM | BODY MASS INDEX: 28.07 KG/M2 | RESPIRATION RATE: 17 BRPM | SYSTOLIC BLOOD PRESSURE: 118 MMHG | HEIGHT: 72 IN | OXYGEN SATURATION: 96 % | WEIGHT: 207.25 LBS

## 2025-03-14 DIAGNOSIS — Z99.81 ON HOME O2: ICD-10-CM

## 2025-03-14 DIAGNOSIS — J44.9 COPD, MODERATE: ICD-10-CM

## 2025-03-14 DIAGNOSIS — Z72.0 TOBACCO DIPPER: ICD-10-CM

## 2025-03-14 DIAGNOSIS — Z87.891 FORMER SMOKER: Primary | ICD-10-CM

## 2025-03-14 DIAGNOSIS — J84.10 PULMONARY FIBROSIS: ICD-10-CM

## 2025-03-14 PROCEDURE — 99999 PR PBB SHADOW E&M-EST. PATIENT-LVL IV: CPT | Mod: PBBFAC,,, | Performed by: INTERNAL MEDICINE

## 2025-03-14 RX ORDER — FLUTICASONE FUROATE, UMECLIDINIUM BROMIDE AND VILANTEROL TRIFENATATE 200; 62.5; 25 UG/1; UG/1; UG/1
1 POWDER RESPIRATORY (INHALATION) DAILY
Qty: 180 EACH | Refills: 3 | Status: SHIPPED | OUTPATIENT
Start: 2025-03-14

## 2025-03-14 NOTE — PROGRESS NOTES
Pulmonary Outpatient Follow Up Visit     Subjective:       Patient ID: Stone Reveles is a 84 y.o. male.    Chief Complaint: Hospital Follow Up      HPI          84-year-old male patient presenting for hospital follow-up.      03/14/2025 had 2 days hospital admission February 26, 2025 for pneumonia.  Discharged home with portable concentrator he had already home concentrator and cylinders.      Currently stable.  No fever.  On Trelegy Ellipta and albuterol p.r.n..      He is  with known history of COPD/emphysema on O2 during sleep and on exertion maintained on Trelegy Ellipta 200 mcg 1 puff daily     CT scan of the chest reviewed.  Severe upper lobe emphysema.  Mild honeycombing at bases noted.    Quit smoking      Dipper         Review of Systems   Respiratory:  Positive for cough, shortness of breath, previous hospitalization due to pulmonary problems and dyspnea on extertion.    Musculoskeletal:  Positive for back pain and gait problem.       Outpatient Encounter Medications as of 3/14/2025   Medication Sig Dispense Refill    albuterol (PROVENTIL) 2.5 mg /3 mL (0.083 %) nebulizer solution Take 3 mLs (2.5 mg total) by nebulization every 4 (four) hours as needed for Wheezing or Shortness of Breath. 360 mL 11    albuterol (VENTOLIN HFA) 90 mcg/actuation inhaler Inhale 2 puffs into the lungs every 4 (four) hours as needed for Wheezing or Shortness of Breath (cough). 18 g 11    aspirin 81 MG Chew Take 81 mg by mouth once daily.      atorvastatin (LIPITOR) 40 MG tablet Take 40 mg by mouth once daily.      clopidogreL (PLAVIX) 75 mg tablet Take 1 tablet by mouth every morning.      EScitalopram oxalate (LEXAPRO) 20 MG tablet Take 1 tablet by mouth every morning.      fluticasone (FLONASE) 50 mcg/actuation nasal spray 1 spray by Each Nare route once daily.      insulin glargine U-100, Lantus, 100 unit/mL injection Inject 16 Units into the skin once daily. 4.8 mL 11    insulin  "syringe-needle U-100 (BD INSULIN SYRINGE ULTRA-FINE) 0.3 mL 31 gauge x 5/16" Syrg Use with insulin once dialy 100 each 0    ipratropium (ATROVENT) 42 mcg (0.06 %) nasal spray 2 sprays by Each Nostril route 2 (two) times daily. 15 mL 11    levocetirizine (XYZAL) 5 MG tablet Take 1 tablet (5 mg total) by mouth every evening. 30 tablet 11    losartan (COZAAR) 50 MG tablet Take 1 tablet by mouth once daily.      metformin (GLUCOPHAGE) 500 MG tablet Take 500 mg by mouth every evening.      metoprolol succinate (TOPROL-XL) 25 MG 24 hr tablet Take 25 mg by mouth 2 (two) times daily.      pantoprazole (PROTONIX) 40 MG tablet Take 1 tablet by mouth once daily.      predniSONE (DELTASONE) 20 MG tablet Take 0.5 tablets (10 mg total) by mouth 2 (two) times daily. (Patient taking differently: Take 10 mg by mouth once daily.) 10 tablet 1    tamsulosin (FLOMAX) 0.4 mg Cap Take 1 capsule by mouth every evening.      traZODone (DESYREL) 50 MG tablet Take 100 mg by mouth every evening.      [DISCONTINUED] TRELEGY ELLIPTA 200-62.5-25 mcg inhaler INHALE 1 PUFF BY MOUTH INTO THE LUNGS ONCE DAILY. 180 each 3    [] cefdinir (OMNICEF) 300 MG capsule Take 1 capsule (300 mg total) by mouth 2 (two) times daily. for 5 days 10 capsule 0    [] doxycycline (VIBRAMYCIN) 100 MG Cap Take 1 capsule (100 mg total) by mouth every 12 (twelve) hours. for 5 days 10 capsule 0    fluticasone-umeclidin-vilanter (TRELEGY ELLIPTA) 200-62.5-25 mcg inhaler Inhale 1 puff into the lungs once daily. 180 each 3    [] methylPREDNISolone (MEDROL) 4 MG Tab Take 5 tablets (20 mg total) by mouth once daily for 5 days, THEN 2.5 tablets (10 mg total) once daily for 5 days. 38 tablet 0    [DISCONTINUED] albuterol (PROVENTIL) 2.5 mg /3 mL (0.083 %) nebulizer solution Take 3 mLs (2.5 mg total) by nebulization every 4 (four) hours as needed for Wheezing or Shortness of Breath. 360 mL 11    [DISCONTINUED] albuterol (VENTOLIN HFA) 90 mcg/actuation inhaler " Inhale 2 puffs into the lungs every 4 (four) hours as needed for Wheezing or Shortness of Breath (cough). 18 g 11    [DISCONTINUED] azithromycin (Z-CHARLENE) 250 MG tablet Take 2 tablets by mouth on day 1; Take 1 tablet by mouth on days 2-5 6 tablet 0    [DISCONTINUED] brimonidine 0.2% (ALPHAGAN) 0.2 % Drop Place into both eyes.      [DISCONTINUED] chlorpheniramine (CHLOR-TRIMETON) 4 mg tablet Take 4 mg by mouth every 4 (four) hours as needed.      [DISCONTINUED] cycloSPORINE (RESTASIS) 0.05 % ophthalmic emulsion Place 1 drop into both eyes 2 (two) times daily.      [DISCONTINUED] dorzolamide (TRUSOPT) 2 % ophthalmic solution Place into both eyes.      [DISCONTINUED] empagliflozin (JARDIANCE) 10 mg tablet Take 10 mg by mouth once daily.      [DISCONTINUED] escitalopram oxalate (LEXAPRO) 20 MG tablet Take 20 mg by mouth once daily.      [DISCONTINUED] HYDROcodone-acetaminophen (NORCO) 5-325 mg per tablet       [DISCONTINUED] loratadine (CLARITIN) 10 mg tablet Take 10 mg by mouth once daily.      [DISCONTINUED] losartan (COZAAR) 50 MG tablet       [DISCONTINUED] meloxicam (MOBIC) 15 MG tablet       [DISCONTINUED] metoprolol tartrate (LOPRESSOR) 25 MG tablet       [DISCONTINUED] metoprolol tartrate (LOPRESSOR) 25 MG tablet Take 1 tablet by mouth 2 (two) times daily.      [DISCONTINUED] metoprolol tartrate (LOPRESSOR) 50 MG tablet Take 50 mg by mouth 2 (two) times daily.      [DISCONTINUED] mupirocin (BACTROBAN) 2 % ointment Apply topically 3 (three) times daily.      [DISCONTINUED] pantoprazole (PROTONIX) 40 MG tablet Take 40 mg by mouth once daily.      [DISCONTINUED] prednisoLONE acetate (PRED FORTE) 1 % DrpS Place into both eyes.      [DISCONTINUED] predniSONE (DELTASONE) 20 MG tablet 3 daily x 3 days, 2 daily x 3 days, 1 daily x 3 days, 1/2 daily x 4 days. 20 tablet 0    [DISCONTINUED] sildenafiL (VIAGRA) 100 MG tablet Take 100 mg by mouth as needed.      [DISCONTINUED] simvastatin (ZOCOR) 10 MG tablet Take 10 mg by  mouth once daily.      [DISCONTINUED] tamsulosin (FLOMAX) 0.4 mg Cp24 Take 0.4 mg by mouth once daily. evening      [DISCONTINUED] ticagrelor (BRILINTA) 90 mg tablet Take 90 mg by mouth.      [DISCONTINUED] tiZANidine (ZANAFLEX) 2 MG tablet       [DISCONTINUED] tiZANidine (ZANAFLEX) 2 MG tablet Take 1 tablet by mouth 2 (two) times daily as needed.      [DISCONTINUED] traZODone (DESYREL) 50 MG tablet       [DISCONTINUED] acetaminophen tablet 650 mg       [DISCONTINUED] acetaminophen tablet 650 mg       [DISCONTINUED] acetaminophen tablet 650 mg       [DISCONTINUED] acetaminophen tablet 650 mg       [DISCONTINUED] albuterol-ipratropium 2.5 mg-0.5 mg/3 mL nebulizer solution 3 mL       [DISCONTINUED] arformoteroL nebulizer solution 15 mcg       [DISCONTINUED] aspirin chewable tablet 81 mg       [DISCONTINUED] atorvastatin tablet 40 mg       [DISCONTINUED] budesonide nebulizer solution 0.5 mg       [DISCONTINUED] cefTRIAXone injection 2 g       [DISCONTINUED] clopidogreL tablet 75 mg       [DISCONTINUED] dextrose 50% injection 12.5 g       [DISCONTINUED] dextrose 50% injection 12.5 g       [DISCONTINUED] dextrose 50% injection 25 g       [DISCONTINUED] dextrose 50% injection 25 g       [DISCONTINUED] doxycycline 100 mg in D5W 100 mL IVPB (MB+)       [DISCONTINUED] enoxaparin injection 40 mg       [DISCONTINUED] EScitalopram oxalate tablet 20 mg       [DISCONTINUED] fluticasone propionate 50 mcg/actuation nasal spray 50 mcg       [DISCONTINUED] furosemide injection 40 mg       [DISCONTINUED] furosemide injection 40 mg       [DISCONTINUED] glucagon (human recombinant) injection 1 mg       [DISCONTINUED] glucagon (human recombinant) injection 1 mg       [DISCONTINUED] glucose chewable tablet 16 g       [DISCONTINUED] glucose chewable tablet 16 g       [DISCONTINUED] glucose chewable tablet 24 g       [DISCONTINUED] glucose chewable tablet 24 g       [DISCONTINUED] insulin aspart U-100 pen 0-10 Units       [DISCONTINUED]  insulin aspart U-100 pen 0-10 Units       [DISCONTINUED] ipratropium 42 mcg (0.06 %) nasal spray 2 spray       [DISCONTINUED] losartan tablet 50 mg       [DISCONTINUED] melatonin tablet 6 mg       [DISCONTINUED] methylPREDNISolone sodium succinate injection 60 mg       [DISCONTINUED] methylPREDNISolone sodium succinate injection 60 mg       [DISCONTINUED] metoprolol succinate (TOPROL-XL) 24 hr tablet 25 mg       [DISCONTINUED] naloxone 0.4 mg/mL injection 0.02 mg       [DISCONTINUED] ondansetron injection 4 mg       [DISCONTINUED] pantoprazole EC tablet 40 mg       [DISCONTINUED] polyethylene glycol packet 17 g       [DISCONTINUED] prochlorperazine injection Soln 5 mg       [DISCONTINUED] simethicone chewable tablet 80 mg       [DISCONTINUED] sodium chloride 0.9% flush 10 mL       [DISCONTINUED] sodium chloride 0.9% flush 3 mL       [DISCONTINUED] tamsulosin 24 hr capsule 0.4 mg       [DISCONTINUED] traZODone tablet 100 mg        No facility-administered encounter medications on file as of 3/14/2025.       Objective:     Vital Signs (Most Recent)  Vital Signs  Pulse: 80  Resp: 17  SpO2: 96 %  BP: 118/74  Patient Position: Sitting  Pain Score: 0-No pain  Height and Weight  Height: 6' (182.9 cm)  Weight: 94 kg (207 lb 3.7 oz)  BSA (Calculated - sq m): 2.19 sq meters  BMI (Calculated): 28.1  Weight in (lb) to have BMI = 25: 183.9]  Wt Readings from Last 2 Encounters:   03/14/25 94 kg (207 lb 3.7 oz)   02/27/25 95.8 kg (211 lb 3.2 oz)       Physical Exam   Constitutional: He is oriented to person, place, and time. He appears well-developed and well-nourished.   Cardiovascular: Normal rate.   Pulmonary/Chest: Normal expansion and effort normal. He has decreased breath sounds. He has rhonchi.   Neurological: He is alert and oriented to person, place, and time.   Psychiatric: His behavior is normal.       Laboratory  Lab Results   Component Value Date    WBC 16.56 (H) 02/28/2025    RBC 4.45 (L) 02/28/2025    HGB 13.0 (L)  "02/28/2025    HCT 40.7 02/28/2025    MCV 92 02/28/2025    MCH 29.2 02/28/2025    MCHC 31.9 (L) 02/28/2025    RDW 14.6 (H) 02/28/2025     02/28/2025    MPV 10.3 02/28/2025    GRAN 15.3 (H) 02/28/2025    GRAN 92.2 (H) 02/28/2025    LYMPH 0.7 (L) 02/28/2025    LYMPH 4.5 (L) 02/28/2025    MONO 0.4 02/28/2025    MONO 2.5 (L) 02/28/2025    EOS 0.0 02/28/2025    BASO 0.01 02/28/2025    EOSINOPHIL 0.0 02/28/2025    BASOPHIL 0.1 02/28/2025       BMP  Lab Results   Component Value Date     02/28/2025    K 4.9 02/28/2025     02/28/2025    CO2 26 02/28/2025    BUN 24 (H) 02/28/2025    CREATININE 1.2 02/28/2025    CALCIUM 8.9 02/28/2025    ANIONGAP 10 02/28/2025    ESTGFRAFRICA >60 05/07/2017    EGFRNONAA >60 05/07/2017    AST 15 02/28/2025    ALT 12 02/28/2025    PROT 6.1 02/28/2025       Lab Results   Component Value Date     (H) 02/26/2025     (H) 07/17/2024     (H) 09/06/2023     (H) 09/02/2023    BNP 59 01/04/2022    BNP 36 05/05/2017       Lab Results   Component Value Date    TSH 1.222 09/02/2023       Lab Results   Component Value Date    SEDRATE 9 07/17/2024       Lab Results   Component Value Date    CRP 1.6 07/17/2024     No results found for: "IGE"     No results found for: "ASPERGILLUS"  No results found for: "AFUMIGATUSCL"     Lab Results   Component Value Date    ACE 36 07/17/2024        Diagnostic Results:    I have personally reviewed today the following studies:          CT chest without contrast9/13/2024    COMPARISON:  02/28/2024, 05/05/2017     FINDINGS:  Base of Neck: No significant abnormality.     Thoracic soft tissues: Normal.     Aorta: Left-sided aortic arch.  No aneurysm.     Heart: Normal size. No effusion. Severe aortic and coronary artery atherosclerotic calcification.     Pulmonary vasculature: Pulmonary arteries distribute normally.     Greta/Mediastinum: No pathologic krzysztof enlargement.     Esophagus: Normal.     Upper Abdomen: No abnormality of the " partially imaged upper abdomen.     Airways: Patent.     Lungs/Pleura: Markedly severe upper lobe predominant centrilobular emphysema.  Unchanged benign calcified right apical pleuroparenchymal scarring.  Benign right upper lobe calcified granuloma.  Unchanged posterior left apical pleuroparenchymal scarring.  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  Findings are new compared to 2017.     No suspicious pulmonary nodules.     Bones: No acute fracture. No suspicious lytic or sclerotic lesions.     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 Findings are new compared to 2017.     Markedly severe upper lobe predominant centrilobular emphysema.        CT ANGIOGRAM PULMONARY 9/2023      No central pulmonary embolus however contrast bolus timing is suboptimal with gradual loss of contrast into the more distal branches of all lobar pulmonary arteries. No convincing evidence of pulmonary embolus.     Emphysema with stable partially calcified right upper lobe nodule consistent with granuloma or hamartoma.     Biapical fibrotic changes with some new associated calcification at the right apex. New mild subpleural fibrotic changes in both lower lobes.     Bilateral nonobstructing renal stones.     Cholelithiasis           Assessment/Plan:   Former smoker    COPD, moderate  -     fluticasone-umeclidin-vilanter (TRELEGY ELLIPTA) 200-62.5-25 mcg inhaler; Inhale 1 puff into the lungs once daily.  Dispense: 180 each; Refill: 3    On home O2    Pulmonary fibrosis              Continue O2 on discharge.      Continue albuterol continue Trelegy Ellipta.      Would like to be evaluated for endobronchial valve placement.          Follow up in about 3 months (around 6/14/2025).    This note  was prepared using voice recognition system and is likely to have sound alike errors that may have been overlooked even after proof reading.  Please call me with any questions    Discussed diagnosis, its evaluation, treatment and usual course. All questions answered.      Briana Pandya MD

## 2025-04-08 ENCOUNTER — TELEPHONE (OUTPATIENT)
Dept: PULMONOLOGY | Facility: CLINIC | Age: 85
End: 2025-04-08
Payer: MEDICARE

## 2025-04-08 NOTE — TELEPHONE ENCOUNTER
Chronic Disease Management:  Called patient to schedule initial Pulmonary Disease Management appointment.     Patient declined at this time.

## 2025-06-16 ENCOUNTER — TELEPHONE (OUTPATIENT)
Dept: PULMONOLOGY | Facility: CLINIC | Age: 85
End: 2025-06-16
Payer: MEDICARE

## 2025-08-05 ENCOUNTER — E-VISIT (OUTPATIENT)
Dept: URGENT CARE | Facility: CLINIC | Age: 85
End: 2025-08-05
Payer: MEDICARE

## 2025-08-05 DIAGNOSIS — R05.2 SUBACUTE COUGH: Primary | ICD-10-CM

## 2025-08-05 DIAGNOSIS — J32.0 MAXILLARY SINUSITIS, UNSPECIFIED CHRONICITY: ICD-10-CM

## 2025-08-05 RX ORDER — PROMETHAZINE HYDROCHLORIDE AND DEXTROMETHORPHAN HYDROBROMIDE 6.25; 15 MG/5ML; MG/5ML
5 SYRUP ORAL EVERY 6 HOURS PRN
Qty: 118 ML | Refills: 0 | Status: SHIPPED | OUTPATIENT
Start: 2025-08-05 | End: 2025-08-12

## 2025-08-05 RX ORDER — DESLORATADINE 5 MG/1
5 TABLET ORAL DAILY
Qty: 7 TABLET | Refills: 0 | Status: SHIPPED | OUTPATIENT
Start: 2025-08-05 | End: 2025-08-12

## 2025-08-05 RX ORDER — DOXYCYCLINE HYCLATE 100 MG
100 TABLET ORAL EVERY 12 HOURS
Qty: 14 TABLET | Refills: 0 | Status: SHIPPED | OUTPATIENT
Start: 2025-08-05 | End: 2025-08-12

## 2025-08-05 NOTE — PROGRESS NOTES
Patient ID: Stone Reveles is a 84 y.o. male.        E-Visit Time Tracking:   Day 1 Time (in minutes): 12  Total Time (in minutes): 12      Chief Complaint: General Illness (Entered automatically based on patient selection in [employer].)      The patient initiated a request through [employer] on 8/5/2025 for evaluation and management with a chief complaint of General Illness (Entered automatically based on patient selection in [employer].)     I evaluated the questionnaire submission on 08/05/2025.    [facility] Evisit Supergroup-Cough And Cold    8/5/2025 12:45 PM CDT - Filed by Patient   What do you need help with? Sinus Infection   Do you agree to participate in an E-Visit? Yes   If you have any of the following symptoms, go to your local emergency room or call 911: I acknowledge   What is the main issue you would like addressed today? Sinus infection   Do you think you might have COVID-19 or the Flu? (COVID-19, Flu, No, Not sure) No   What symptoms do you currently have?(Chills, Cough, Diarrhea, Fatigue, Headache, Stuffy nose, Loss of taste or smell, Muscle or body aches, Nausea, Runny nose, Sore throat, Face and nose pain, Ear pain, Neck pain, None) Cough;  Sore throat;  Face and nose pain   Describe your cough:(Contains mucus, Comes and goes, Dry, Does not stop, Interferes with daily activities ) Dry   Have you had any trouble with your breathing, swollowing, or vision?(Swallowing, Breathing, Vision, None) None   Have you ever smoked?(Smoked in the past, Currently smoke, Never smoked) Smoked in the past   What has been the range of your fever?(Below 100.4, 100.4 or higher, Not sure) No   When did your concern begin? 7/25/2025   In the last two weeks, have you been in close contact with someone who has COVID-19, the Flu, or strep throat? No   What have you tried to help your symptoms? Drinking more fluids;  Other   List what you have done or taken to help your symptoms Flonase, albuterol by nebulizer, Mucinex   On a  scale of 1-10, where 10 is the worst you can imagine, how severe are your symptoms? (range: 1 - 10) 5   Have your symptoms changed since they first started?(Improved, Worsened, No change) No change   Do you have transportation to an [facility] location to get tested for COVID-19? No   Provide any additional information you feel is important. Need antibiotics for a sinus infection. Doxycycline usually works well   Please attach any relevant images or files    Are you able to take your vitals? Yes   Systolic Blood Pressure 125   Diastolic Blood Pressure 81   Weight:    Height: 70   Pluse: 80   Temp 98.8   Resp:    SpO2 96         No diagnosis found.     No orders of the defined types were placed in this encounter.           No follow-ups on file.